# Patient Record
Sex: MALE | Race: WHITE | HISPANIC OR LATINO | Employment: FULL TIME | URBAN - METROPOLITAN AREA
[De-identification: names, ages, dates, MRNs, and addresses within clinical notes are randomized per-mention and may not be internally consistent; named-entity substitution may affect disease eponyms.]

---

## 2017-12-21 ENCOUNTER — NON-PROVIDER VISIT (OUTPATIENT)
Dept: OCCUPATIONAL MEDICINE | Facility: CLINIC | Age: 32
End: 2017-12-21

## 2017-12-21 DIAGNOSIS — Z11.1 ENCOUNTER FOR PPD TEST: ICD-10-CM

## 2017-12-21 PROCEDURE — 86580 TB INTRADERMAL TEST: CPT | Performed by: PREVENTIVE MEDICINE

## 2017-12-23 ENCOUNTER — NON-PROVIDER VISIT (OUTPATIENT)
Dept: URGENT CARE | Facility: CLINIC | Age: 32
End: 2017-12-23

## 2017-12-23 LAB — TB WHEAL 3D P 5 TU DIAM: NORMAL MM

## 2018-06-27 ENCOUNTER — HOSPITAL ENCOUNTER (EMERGENCY)
Dept: HOSPITAL 8 - ED | Age: 33
LOS: 1 days | Discharge: HOME | End: 2018-06-28
Payer: MEDICAID

## 2018-06-27 VITALS — WEIGHT: 315 LBS | HEIGHT: 70 IN | BODY MASS INDEX: 45.1 KG/M2

## 2018-06-27 DIAGNOSIS — E11.9: ICD-10-CM

## 2018-06-27 DIAGNOSIS — I10: ICD-10-CM

## 2018-06-27 DIAGNOSIS — N45.1: Primary | ICD-10-CM

## 2018-06-27 PROCEDURE — 81001 URINALYSIS AUTO W/SCOPE: CPT

## 2018-06-27 PROCEDURE — 87491 CHLMYD TRACH DNA AMP PROBE: CPT

## 2018-06-27 PROCEDURE — 87591 N.GONORRHOEAE DNA AMP PROB: CPT

## 2018-06-27 PROCEDURE — 99285 EMERGENCY DEPT VISIT HI MDM: CPT

## 2018-06-27 PROCEDURE — 76870 US EXAM SCROTUM: CPT

## 2018-06-28 VITALS — DIASTOLIC BLOOD PRESSURE: 82 MMHG | SYSTOLIC BLOOD PRESSURE: 138 MMHG

## 2018-06-28 LAB
CULTURE INDICATED?: NO
MICROSCOPIC: (no result)

## 2018-07-18 ENCOUNTER — HOSPITAL ENCOUNTER (EMERGENCY)
Dept: HOSPITAL 8 - ED | Age: 33
Discharge: HOME | End: 2018-07-18
Payer: MEDICAID

## 2018-07-18 VITALS — BODY MASS INDEX: 45.1 KG/M2 | WEIGHT: 315 LBS | HEIGHT: 70 IN

## 2018-07-18 VITALS — SYSTOLIC BLOOD PRESSURE: 161 MMHG | DIASTOLIC BLOOD PRESSURE: 99 MMHG
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
## 2018-07-18 DIAGNOSIS — F17.210: ICD-10-CM

## 2018-07-18 DIAGNOSIS — S80.01XA: Primary | ICD-10-CM
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
## 2018-07-18 DIAGNOSIS — I10: ICD-10-CM

## 2018-07-18 DIAGNOSIS — E11.9: ICD-10-CM
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
## 2018-07-18 LAB
ALBUMIN SERPL-MCNC: 3.4 G/DL (ref 3.4–5)
ANION GAP SERPL CALC-SCNC: 5 MMOL/L (ref 5–15)
BASOPHILS # BLD AUTO: 0.02 X10^3/UL (ref 0–0.1)
BASOPHILS NFR BLD AUTO: 0 % (ref 0–1)
CALCIUM SERPL-MCNC: 8.7 MG/DL (ref 8.5–10.1)
CHLORIDE SERPL-SCNC: 111 MMOL/L (ref 98–107)
CREAT SERPL-MCNC: 1.04 MG/DL (ref 0.7–1.3)
EOSINOPHIL # BLD AUTO: 0.08 X10^3/UL (ref 0–0.4)
EOSINOPHIL NFR BLD AUTO: 1 % (ref 1–7)
ERYTHROCYTE [DISTWIDTH] IN BLOOD BY AUTOMATED COUNT: 12.6 % (ref 9.4–14.8)
LYMPHOCYTES # BLD AUTO: 1.08 X10^3/UL (ref 1–3.4)
LYMPHOCYTES NFR BLD AUTO: 16 % (ref 22–44)
MCH RBC QN AUTO: 29.7 PG (ref 27.5–34.5)
MCHC RBC AUTO-ENTMCNC: 34.1 G/DL (ref 33.2–36.2)
MCV RBC AUTO: 87.2 FL (ref 81–97)
MD: NO
MONOCYTES # BLD AUTO: 0.1 X10^3/UL (ref 0.2–0.8)
MONOCYTES NFR BLD AUTO: 2 % (ref 2–9)
NEUTROPHILS # BLD AUTO: 5.4 X10^3/UL (ref 1.8–6.8)
NEUTROPHILS NFR BLD AUTO: 81 % (ref 42–75)
PLATELET # BLD AUTO: 190 X10^3/UL (ref 130–400)
PMV BLD AUTO: 8 FL (ref 7.4–10.4)
RBC # BLD AUTO: 5.2 X10^6/UL (ref 4.38–5.82)
TROPONIN I SERPL-MCNC: 0.02 NG/ML (ref 0–0.04)

## 2018-07-18 PROCEDURE — 82040 ASSAY OF SERUM ALBUMIN: CPT

## 2018-07-18 PROCEDURE — 36415 COLL VENOUS BLD VENIPUNCTURE: CPT

## 2018-07-18 PROCEDURE — 93005 ELECTROCARDIOGRAM TRACING: CPT

## 2018-07-18 PROCEDURE — 71045 X-RAY EXAM CHEST 1 VIEW: CPT

## 2018-07-18 PROCEDURE — 99285 EMERGENCY DEPT VISIT HI MDM: CPT

## 2018-07-18 PROCEDURE — 80048 BASIC METABOLIC PNL TOTAL CA: CPT

## 2018-07-18 PROCEDURE — 84484 ASSAY OF TROPONIN QUANT: CPT

## 2018-07-18 PROCEDURE — 85025 COMPLETE CBC W/AUTO DIFF WBC: CPT

## 2018-12-12 ENCOUNTER — NON-PROVIDER VISIT (OUTPATIENT)
Dept: OCCUPATIONAL MEDICINE | Facility: CLINIC | Age: 33
End: 2018-12-12

## 2018-12-12 DIAGNOSIS — Z11.1 PPD SCREENING TEST: ICD-10-CM

## 2018-12-12 PROCEDURE — 86580 TB INTRADERMAL TEST: CPT | Performed by: PREVENTIVE MEDICINE

## 2018-12-15 ENCOUNTER — NON-PROVIDER VISIT (OUTPATIENT)
Dept: URGENT CARE | Facility: CLINIC | Age: 33
End: 2018-12-15
Payer: MEDICAID

## 2018-12-15 LAB — TB WHEAL 3D P 5 TU DIAM: NORMAL MM

## 2018-12-28 ENCOUNTER — HOSPITAL ENCOUNTER (EMERGENCY)
Dept: HOSPITAL 8 - ED | Age: 33
Discharge: HOME | End: 2018-12-28
Payer: MEDICAID

## 2018-12-28 VITALS — WEIGHT: 315 LBS | HEIGHT: 70 IN | BODY MASS INDEX: 45.1 KG/M2

## 2018-12-28 VITALS — SYSTOLIC BLOOD PRESSURE: 145 MMHG | DIASTOLIC BLOOD PRESSURE: 92 MMHG

## 2018-12-28 DIAGNOSIS — R07.89: Primary | ICD-10-CM

## 2018-12-28 DIAGNOSIS — M54.5: ICD-10-CM

## 2018-12-28 DIAGNOSIS — I10: ICD-10-CM

## 2018-12-28 LAB
ALBUMIN SERPL-MCNC: 3.2 G/DL (ref 3.4–5)
ANION GAP SERPL CALC-SCNC: 10 MMOL/L (ref 5–15)
BASOPHILS # BLD AUTO: 0.03 X10^3/UL (ref 0–0.1)
BASOPHILS NFR BLD AUTO: 0 % (ref 0–1)
CALCIUM SERPL-MCNC: 8.7 MG/DL (ref 8.5–10.1)
CHLORIDE SERPL-SCNC: 101 MMOL/L (ref 98–107)
CREAT SERPL-MCNC: 1.25 MG/DL (ref 0.7–1.3)
EOSINOPHIL # BLD AUTO: 0.02 X10^3/UL (ref 0–0.4)
EOSINOPHIL NFR BLD AUTO: 0 % (ref 1–7)
ERYTHROCYTE [DISTWIDTH] IN BLOOD BY AUTOMATED COUNT: 12.6 % (ref 9.4–14.8)
LYMPHOCYTES # BLD AUTO: 0.45 X10^3/UL (ref 1–3.4)
LYMPHOCYTES NFR BLD AUTO: 5 % (ref 22–44)
MCH RBC QN AUTO: 30.7 PG (ref 27.5–34.5)
MCHC RBC AUTO-ENTMCNC: 34.9 G/DL (ref 33.2–36.2)
MCV RBC AUTO: 87.9 FL (ref 81–97)
MD: NO
MONOCYTES # BLD AUTO: 0.05 X10^3/UL (ref 0.2–0.8)
MONOCYTES NFR BLD AUTO: 1 % (ref 2–9)
NEUTROPHILS # BLD AUTO: 7.87 X10^3/UL (ref 1.8–6.8)
NEUTROPHILS NFR BLD AUTO: 94 % (ref 42–75)
PLATELET # BLD AUTO: 244 X10^3/UL (ref 130–400)
PMV BLD AUTO: 7.7 FL (ref 7.4–10.4)
RBC # BLD AUTO: 5.14 X10^6/UL (ref 4.38–5.82)
TROPONIN I SERPL-MCNC: < 0.015 NG/ML (ref 0–0.04)

## 2018-12-28 PROCEDURE — 84484 ASSAY OF TROPONIN QUANT: CPT

## 2018-12-28 PROCEDURE — 99284 EMERGENCY DEPT VISIT MOD MDM: CPT

## 2018-12-28 PROCEDURE — 85025 COMPLETE CBC W/AUTO DIFF WBC: CPT

## 2018-12-28 PROCEDURE — 80048 BASIC METABOLIC PNL TOTAL CA: CPT

## 2018-12-28 PROCEDURE — 36415 COLL VENOUS BLD VENIPUNCTURE: CPT

## 2018-12-28 PROCEDURE — 93005 ELECTROCARDIOGRAM TRACING: CPT

## 2018-12-28 PROCEDURE — 71045 X-RAY EXAM CHEST 1 VIEW: CPT

## 2018-12-28 PROCEDURE — 82040 ASSAY OF SERUM ALBUMIN: CPT

## 2020-01-29 ENCOUNTER — HOSPITAL ENCOUNTER (INPATIENT)
Facility: MEDICAL CENTER | Age: 35
LOS: 3 days | DRG: 065 | End: 2020-02-01
Attending: EMERGENCY MEDICINE | Admitting: HOSPITALIST
Payer: MEDICAID

## 2020-01-29 ENCOUNTER — APPOINTMENT (OUTPATIENT)
Dept: RADIOLOGY | Facility: MEDICAL CENTER | Age: 35
DRG: 065 | End: 2020-01-29
Attending: EMERGENCY MEDICINE
Payer: MEDICAID

## 2020-01-29 DIAGNOSIS — R56.9 SEIZURE (HCC): ICD-10-CM

## 2020-01-29 DIAGNOSIS — I63.81 CEREBROVASCULAR ACCIDENT (CVA) DUE TO OCCLUSION OF SMALL ARTERY (HCC): ICD-10-CM

## 2020-01-29 DIAGNOSIS — R41.0 DELIRIUM: ICD-10-CM

## 2020-01-29 DIAGNOSIS — I16.0 HYPERTENSIVE URGENCY: ICD-10-CM

## 2020-01-29 DIAGNOSIS — I10 HTN (HYPERTENSION): ICD-10-CM

## 2020-01-29 PROBLEM — E87.29 HIGH ANION GAP METABOLIC ACIDOSIS: Status: ACTIVE | Noted: 2020-01-29

## 2020-01-29 PROBLEM — R45.1 AGITATION: Status: ACTIVE | Noted: 2020-01-29

## 2020-01-29 PROBLEM — E87.20 LACTIC ACIDOSIS: Status: ACTIVE | Noted: 2020-01-29

## 2020-01-29 PROBLEM — E11.9 DM (DIABETES MELLITUS) (HCC): Status: ACTIVE | Noted: 2020-01-29

## 2020-01-29 PROBLEM — G93.40 ENCEPHALOPATHY: Status: ACTIVE | Noted: 2020-01-29

## 2020-01-29 LAB
ALBUMIN SERPL BCP-MCNC: 3.8 G/DL (ref 3.2–4.9)
ALBUMIN/GLOB SERPL: 0.8 G/DL
ALP SERPL-CCNC: 102 U/L (ref 30–99)
ALT SERPL-CCNC: 37 U/L (ref 2–50)
AMMONIA PLAS-SCNC: 38 UMOL/L (ref 11–45)
AMPHET UR QL SCN: NEGATIVE
ANION GAP SERPL CALC-SCNC: 17 MMOL/L (ref 0–11.9)
APPEARANCE UR: CLEAR
AST SERPL-CCNC: 29 U/L (ref 12–45)
B-OH-BUTYR SERPL-MCNC: 0.25 MMOL/L (ref 0.02–0.27)
BACTERIA #/AREA URNS HPF: NEGATIVE /HPF
BARBITURATES UR QL SCN: NEGATIVE
BASOPHILS # BLD AUTO: 0.7 % (ref 0–1.8)
BASOPHILS # BLD: 0.05 K/UL (ref 0–0.12)
BENZODIAZ UR QL SCN: POSITIVE
BILIRUB SERPL-MCNC: 1.5 MG/DL (ref 0.1–1.5)
BILIRUB UR QL STRIP.AUTO: NEGATIVE
BUN SERPL-MCNC: 17 MG/DL (ref 8–22)
BZE UR QL SCN: NEGATIVE
CALCIUM SERPL-MCNC: 9.3 MG/DL (ref 8.5–10.5)
CANNABINOIDS UR QL SCN: NEGATIVE
CHLORIDE SERPL-SCNC: 101 MMOL/L (ref 96–112)
CK SERPL-CCNC: 168 U/L (ref 0–154)
CO2 SERPL-SCNC: 17 MMOL/L (ref 20–33)
COHGB MFR BLD: 2.4 % (ref 0–4.9)
COLOR UR: YELLOW
CREAT SERPL-MCNC: 1.06 MG/DL (ref 0.5–1.4)
EOSINOPHIL # BLD AUTO: 0.03 K/UL (ref 0–0.51)
EOSINOPHIL NFR BLD: 0.4 % (ref 0–6.9)
EPI CELLS #/AREA URNS HPF: ABNORMAL /HPF
ERYTHROCYTE [DISTWIDTH] IN BLOOD BY AUTOMATED COUNT: 38 FL (ref 35.9–50)
EST. AVERAGE GLUCOSE BLD GHB EST-MCNC: 318 MG/DL
ETHANOL BLD-MCNC: 0 G/DL
GLOBULIN SER CALC-MCNC: 4.9 G/DL (ref 1.9–3.5)
GLUCOSE SERPL-MCNC: 421 MG/DL (ref 65–99)
GLUCOSE UR STRIP.AUTO-MCNC: >=1000 MG/DL
HBA1C MFR BLD: 12.7 % (ref 0–5.6)
HCT VFR BLD AUTO: 50.5 % (ref 42–52)
HGB BLD-MCNC: 16.9 G/DL (ref 14–18)
HIV 1+2 AB+HIV1 P24 AG SERPL QL IA: NON REACTIVE
HYALINE CASTS #/AREA URNS LPF: ABNORMAL /LPF
IMM GRANULOCYTES # BLD AUTO: 0.06 K/UL (ref 0–0.11)
IMM GRANULOCYTES NFR BLD AUTO: 0.9 % (ref 0–0.9)
KETONES UR STRIP.AUTO-MCNC: ABNORMAL MG/DL
LACTATE BLD-SCNC: 1.7 MMOL/L (ref 0.5–2)
LACTATE BLD-SCNC: 2.6 MMOL/L (ref 0.5–2)
LACTATE BLD-SCNC: 7.2 MMOL/L (ref 0.5–2)
LEUKOCYTE ESTERASE UR QL STRIP.AUTO: NEGATIVE
LYMPHOCYTES # BLD AUTO: 1.16 K/UL (ref 1–4.8)
LYMPHOCYTES NFR BLD: 16.5 % (ref 22–41)
MCH RBC QN AUTO: 28.9 PG (ref 27–33)
MCHC RBC AUTO-ENTMCNC: 33.5 G/DL (ref 33.7–35.3)
MCV RBC AUTO: 86.5 FL (ref 81.4–97.8)
METHADONE UR QL SCN: NEGATIVE
MICRO URNS: ABNORMAL
MONOCYTES # BLD AUTO: 0.32 K/UL (ref 0–0.85)
MONOCYTES NFR BLD AUTO: 4.5 % (ref 0–13.4)
NEUTROPHILS # BLD AUTO: 5.43 K/UL (ref 1.82–7.42)
NEUTROPHILS NFR BLD: 77 % (ref 44–72)
NITRITE UR QL STRIP.AUTO: NEGATIVE
NRBC # BLD AUTO: 0 K/UL
NRBC BLD-RTO: 0 /100 WBC
OPIATES UR QL SCN: NEGATIVE
OSMOLALITY SERPL: 297 MOSM/KG H2O (ref 278–298)
OXYCODONE UR QL SCN: NEGATIVE
PCP UR QL SCN: NEGATIVE
PH UR STRIP.AUTO: 5 [PH] (ref 5–8)
PLATELET # BLD AUTO: 201 K/UL (ref 164–446)
PMV BLD AUTO: 10 FL (ref 9–12.9)
POTASSIUM SERPL-SCNC: 4 MMOL/L (ref 3.6–5.5)
PROLACTIN SERPL-MCNC: 19.26 NG/ML (ref 2.1–17.7)
PROPOXYPH UR QL SCN: NEGATIVE
PROT SERPL-MCNC: 8.7 G/DL (ref 6–8.2)
PROT UR QL STRIP: 30 MG/DL
RBC # BLD AUTO: 5.84 M/UL (ref 4.7–6.1)
RBC # URNS HPF: ABNORMAL /HPF
RBC UR QL AUTO: NEGATIVE
SODIUM SERPL-SCNC: 135 MMOL/L (ref 135–145)
SP GR UR REFRACTOMETRY: >1.05
T4 FREE SERPL-MCNC: 0.96 NG/DL (ref 0.53–1.43)
TREPONEMA PALLIDUM IGG+IGM AB [PRESENCE] IN SERUM OR PLASMA BY IMMUNOASSAY: NON REACTIVE
TSH SERPL DL<=0.005 MIU/L-ACNC: 2.26 UIU/ML (ref 0.38–5.33)
UROBILINOGEN UR STRIP.AUTO-MCNC: 1 MG/DL
VIT B12 SERPL-MCNC: 493 PG/ML (ref 211–911)
WBC # BLD AUTO: 7.1 K/UL (ref 4.8–10.8)
WBC #/AREA URNS HPF: ABNORMAL /HPF
YEAST BUDDING URNS QL: PRESENT /HPF

## 2020-01-29 PROCEDURE — 36415 COLL VENOUS BLD VENIPUNCTURE: CPT

## 2020-01-29 PROCEDURE — 87389 HIV-1 AG W/HIV-1&-2 AB AG IA: CPT

## 2020-01-29 PROCEDURE — 86780 TREPONEMA PALLIDUM: CPT

## 2020-01-29 PROCEDURE — 700105 HCHG RX REV CODE 258: Performed by: EMERGENCY MEDICINE

## 2020-01-29 PROCEDURE — 700111 HCHG RX REV CODE 636 W/ 250 OVERRIDE (IP): Performed by: EMERGENCY MEDICINE

## 2020-01-29 PROCEDURE — 80307 DRUG TEST PRSMV CHEM ANLYZR: CPT

## 2020-01-29 PROCEDURE — 700111 HCHG RX REV CODE 636 W/ 250 OVERRIDE (IP): Performed by: HOSPITALIST

## 2020-01-29 PROCEDURE — 700105 HCHG RX REV CODE 258: Performed by: HOSPITALIST

## 2020-01-29 PROCEDURE — 99291 CRITICAL CARE FIRST HOUR: CPT | Performed by: INTERNAL MEDICINE

## 2020-01-29 PROCEDURE — 96374 THER/PROPH/DIAG INJ IV PUSH: CPT

## 2020-01-29 PROCEDURE — 83605 ASSAY OF LACTIC ACID: CPT

## 2020-01-29 PROCEDURE — 700111 HCHG RX REV CODE 636 W/ 250 OVERRIDE (IP)

## 2020-01-29 PROCEDURE — 82375 ASSAY CARBOXYHB QUANT: CPT

## 2020-01-29 PROCEDURE — 82140 ASSAY OF AMMONIA: CPT

## 2020-01-29 PROCEDURE — 304561 HCHG STAT O2

## 2020-01-29 PROCEDURE — 96375 TX/PRO/DX INJ NEW DRUG ADDON: CPT

## 2020-01-29 PROCEDURE — 82010 KETONE BODYS QUAN: CPT

## 2020-01-29 PROCEDURE — 95819 EEG AWAKE AND ASLEEP: CPT | Mod: 26 | Performed by: PSYCHIATRY & NEUROLOGY

## 2020-01-29 PROCEDURE — 82607 VITAMIN B-12: CPT

## 2020-01-29 PROCEDURE — 84146 ASSAY OF PROLACTIN: CPT

## 2020-01-29 PROCEDURE — 83036 HEMOGLOBIN GLYCOSYLATED A1C: CPT

## 2020-01-29 PROCEDURE — 95819 EEG AWAKE AND ASLEEP: CPT | Performed by: PSYCHIATRY & NEUROLOGY

## 2020-01-29 PROCEDURE — 700101 HCHG RX REV CODE 250: Performed by: INTERNAL MEDICINE

## 2020-01-29 PROCEDURE — 96372 THER/PROPH/DIAG INJ SC/IM: CPT

## 2020-01-29 PROCEDURE — 83930 ASSAY OF BLOOD OSMOLALITY: CPT

## 2020-01-29 PROCEDURE — 81001 URINALYSIS AUTO W/SCOPE: CPT

## 2020-01-29 PROCEDURE — 99223 1ST HOSP IP/OBS HIGH 75: CPT | Performed by: HOSPITALIST

## 2020-01-29 PROCEDURE — 84439 ASSAY OF FREE THYROXINE: CPT

## 2020-01-29 PROCEDURE — 700101 HCHG RX REV CODE 250: Performed by: EMERGENCY MEDICINE

## 2020-01-29 PROCEDURE — 80053 COMPREHEN METABOLIC PANEL: CPT

## 2020-01-29 PROCEDURE — 4A10X4Z MONITORING OF CENTRAL NERVOUS ELECTRICAL ACTIVITY, EXTERNAL APPROACH: ICD-10-PCS | Performed by: PSYCHIATRY & NEUROLOGY

## 2020-01-29 PROCEDURE — 84443 ASSAY THYROID STIM HORMONE: CPT

## 2020-01-29 PROCEDURE — 96376 TX/PRO/DX INJ SAME DRUG ADON: CPT

## 2020-01-29 PROCEDURE — 85025 COMPLETE CBC W/AUTO DIFF WBC: CPT

## 2020-01-29 PROCEDURE — 82550 ASSAY OF CK (CPK): CPT

## 2020-01-29 PROCEDURE — 99291 CRITICAL CARE FIRST HOUR: CPT

## 2020-01-29 PROCEDURE — 770022 HCHG ROOM/CARE - ICU (200)

## 2020-01-29 PROCEDURE — 70450 CT HEAD/BRAIN W/O DYE: CPT

## 2020-01-29 RX ORDER — AMOXICILLIN 250 MG
2 CAPSULE ORAL 2 TIMES DAILY
Status: DISCONTINUED | OUTPATIENT
Start: 2020-01-29 | End: 2020-02-01 | Stop reason: HOSPADM

## 2020-01-29 RX ORDER — PROCHLORPERAZINE EDISYLATE 5 MG/ML
5-10 INJECTION INTRAMUSCULAR; INTRAVENOUS EVERY 4 HOURS PRN
Status: DISCONTINUED | OUTPATIENT
Start: 2020-01-29 | End: 2020-02-01 | Stop reason: HOSPADM

## 2020-01-29 RX ORDER — LORAZEPAM 2 MG/ML
2 INJECTION INTRAMUSCULAR
Status: DISCONTINUED | OUTPATIENT
Start: 2020-01-29 | End: 2020-02-01 | Stop reason: HOSPADM

## 2020-01-29 RX ORDER — HYDRALAZINE HYDROCHLORIDE 20 MG/ML
10-20 INJECTION INTRAMUSCULAR; INTRAVENOUS EVERY 6 HOURS PRN
Status: DISCONTINUED | OUTPATIENT
Start: 2020-01-29 | End: 2020-02-01 | Stop reason: HOSPADM

## 2020-01-29 RX ORDER — ONDANSETRON 4 MG/1
4 TABLET, ORALLY DISINTEGRATING ORAL EVERY 4 HOURS PRN
Status: DISCONTINUED | OUTPATIENT
Start: 2020-01-29 | End: 2020-02-01 | Stop reason: HOSPADM

## 2020-01-29 RX ORDER — PROMETHAZINE HYDROCHLORIDE 25 MG/1
12.5-25 SUPPOSITORY RECTAL EVERY 4 HOURS PRN
Status: DISCONTINUED | OUTPATIENT
Start: 2020-01-29 | End: 2020-02-01 | Stop reason: HOSPADM

## 2020-01-29 RX ORDER — ONDANSETRON 2 MG/ML
4 INJECTION INTRAMUSCULAR; INTRAVENOUS EVERY 4 HOURS PRN
Status: DISCONTINUED | OUTPATIENT
Start: 2020-01-29 | End: 2020-02-01 | Stop reason: HOSPADM

## 2020-01-29 RX ORDER — LORAZEPAM 2 MG/ML
2 INJECTION INTRAMUSCULAR ONCE
Status: COMPLETED | OUTPATIENT
Start: 2020-01-29 | End: 2020-01-29

## 2020-01-29 RX ORDER — HEPARIN SODIUM 5000 [USP'U]/ML
5000 INJECTION, SOLUTION INTRAVENOUS; SUBCUTANEOUS EVERY 8 HOURS
Status: DISCONTINUED | OUTPATIENT
Start: 2020-01-29 | End: 2020-02-01 | Stop reason: HOSPADM

## 2020-01-29 RX ORDER — SODIUM CHLORIDE 9 MG/ML
INJECTION, SOLUTION INTRAVENOUS CONTINUOUS
Status: DISCONTINUED | OUTPATIENT
Start: 2020-01-29 | End: 2020-01-30

## 2020-01-29 RX ORDER — ONDANSETRON 2 MG/ML
4 INJECTION INTRAMUSCULAR; INTRAVENOUS ONCE
Status: COMPLETED | OUTPATIENT
Start: 2020-01-29 | End: 2020-01-29

## 2020-01-29 RX ORDER — POLYETHYLENE GLYCOL 3350 17 G/17G
1 POWDER, FOR SOLUTION ORAL
Status: DISCONTINUED | OUTPATIENT
Start: 2020-01-29 | End: 2020-02-01 | Stop reason: HOSPADM

## 2020-01-29 RX ORDER — LORAZEPAM 2 MG/ML
4 INJECTION INTRAMUSCULAR
Status: DISCONTINUED | OUTPATIENT
Start: 2020-01-29 | End: 2020-01-29

## 2020-01-29 RX ORDER — ONDANSETRON 2 MG/ML
INJECTION INTRAMUSCULAR; INTRAVENOUS
Status: COMPLETED
Start: 2020-01-29 | End: 2020-01-29

## 2020-01-29 RX ORDER — HALOPERIDOL 5 MG/ML
2-5 INJECTION INTRAMUSCULAR EVERY 4 HOURS PRN
Status: DISCONTINUED | OUTPATIENT
Start: 2020-01-29 | End: 2020-02-01 | Stop reason: HOSPADM

## 2020-01-29 RX ORDER — SODIUM CHLORIDE, SODIUM LACTATE, POTASSIUM CHLORIDE, CALCIUM CHLORIDE 600; 310; 30; 20 MG/100ML; MG/100ML; MG/100ML; MG/100ML
2000 INJECTION, SOLUTION INTRAVENOUS ONCE
Status: COMPLETED | OUTPATIENT
Start: 2020-01-29 | End: 2020-01-29

## 2020-01-29 RX ORDER — BISACODYL 10 MG
10 SUPPOSITORY, RECTAL RECTAL
Status: DISCONTINUED | OUTPATIENT
Start: 2020-01-29 | End: 2020-02-01 | Stop reason: HOSPADM

## 2020-01-29 RX ORDER — PROMETHAZINE HYDROCHLORIDE 25 MG/1
12.5-25 TABLET ORAL EVERY 4 HOURS PRN
Status: DISCONTINUED | OUTPATIENT
Start: 2020-01-29 | End: 2020-02-01 | Stop reason: HOSPADM

## 2020-01-29 RX ORDER — LABETALOL HYDROCHLORIDE 5 MG/ML
10 INJECTION, SOLUTION INTRAVENOUS ONCE
Status: COMPLETED | OUTPATIENT
Start: 2020-01-29 | End: 2020-01-29

## 2020-01-29 RX ORDER — ENALAPRILAT 1.25 MG/ML
1.25 INJECTION INTRAVENOUS EVERY 6 HOURS PRN
Status: DISCONTINUED | OUTPATIENT
Start: 2020-01-29 | End: 2020-01-31

## 2020-01-29 RX ORDER — LABETALOL HYDROCHLORIDE 5 MG/ML
10-20 INJECTION, SOLUTION INTRAVENOUS EVERY 6 HOURS PRN
Status: DISCONTINUED | OUTPATIENT
Start: 2020-01-29 | End: 2020-01-31

## 2020-01-29 RX ORDER — CHLORTHALIDONE 25 MG/1
25 TABLET ORAL DAILY
Status: DISCONTINUED | OUTPATIENT
Start: 2020-01-29 | End: 2020-02-01 | Stop reason: HOSPADM

## 2020-01-29 RX ORDER — LORAZEPAM 2 MG/ML
2 INJECTION INTRAMUSCULAR ONCE
Status: DISCONTINUED | OUTPATIENT
Start: 2020-01-29 | End: 2020-01-29

## 2020-01-29 RX ORDER — HYDRALAZINE HYDROCHLORIDE 20 MG/ML
10 INJECTION INTRAMUSCULAR; INTRAVENOUS EVERY 6 HOURS PRN
Status: DISCONTINUED | OUTPATIENT
Start: 2020-01-29 | End: 2020-01-29

## 2020-01-29 RX ADMIN — HYDRALAZINE HYDROCHLORIDE 10 MG: 20 INJECTION INTRAMUSCULAR; INTRAVENOUS at 14:10

## 2020-01-29 RX ADMIN — SODIUM CHLORIDE, POTASSIUM CHLORIDE, SODIUM LACTATE AND CALCIUM CHLORIDE 2000 ML: 600; 310; 30; 20 INJECTION, SOLUTION INTRAVENOUS at 02:45

## 2020-01-29 RX ADMIN — LORAZEPAM 2 MG: 2 INJECTION INTRAMUSCULAR; INTRAVENOUS at 04:28

## 2020-01-29 RX ADMIN — HEPARIN SODIUM 5000 UNITS: 5000 INJECTION, SOLUTION INTRAVENOUS; SUBCUTANEOUS at 13:58

## 2020-01-29 RX ADMIN — ONDANSETRON 4 MG: 2 INJECTION INTRAMUSCULAR; INTRAVENOUS at 05:00

## 2020-01-29 RX ADMIN — HALOPERIDOL LACTATE 5 MG: 5 INJECTION, SOLUTION INTRAMUSCULAR at 13:58

## 2020-01-29 RX ADMIN — LORAZEPAM 2 MG: 2 INJECTION INTRAMUSCULAR; INTRAVENOUS at 14:30

## 2020-01-29 RX ADMIN — LABETALOL HYDROCHLORIDE 10 MG: 5 INJECTION, SOLUTION INTRAVENOUS at 06:00

## 2020-01-29 RX ADMIN — SODIUM CHLORIDE: 9 INJECTION, SOLUTION INTRAVENOUS at 13:58

## 2020-01-29 RX ADMIN — LORAZEPAM 2 MG: 2 INJECTION INTRAMUSCULAR; INTRAVENOUS at 03:45

## 2020-01-29 RX ADMIN — HEPARIN SODIUM 5000 UNITS: 5000 INJECTION, SOLUTION INTRAVENOUS; SUBCUTANEOUS at 22:10

## 2020-01-29 RX ADMIN — HYDRALAZINE HYDROCHLORIDE 10 MG: 20 INJECTION INTRAMUSCULAR; INTRAVENOUS at 14:50

## 2020-01-29 RX ADMIN — SODIUM CHLORIDE: 9 INJECTION, SOLUTION INTRAVENOUS at 22:26

## 2020-01-29 RX ADMIN — LABETALOL HYDROCHLORIDE 5 MG: 5 INJECTION, SOLUTION INTRAVENOUS at 22:10

## 2020-01-29 ASSESSMENT — LIFESTYLE VARIABLES
HAVE PEOPLE ANNOYED YOU BY CRITICIZING YOUR DRINKING: NO
ALCOHOL_USE: NO
EVER HAD A DRINK FIRST THING IN THE MORNING TO STEADY YOUR NERVES TO GET RID OF A HANGOVER: NO
TOTAL SCORE: 0
HOW MANY TIMES IN THE PAST YEAR HAVE YOU HAD 5 OR MORE DRINKS IN A DAY: 0
TOTAL SCORE: 0
TOTAL SCORE: 0
EVER FELT BAD OR GUILTY ABOUT YOUR DRINKING: NO
HAVE YOU EVER FELT YOU SHOULD CUT DOWN ON YOUR DRINKING: NO
EVER_SMOKED: YES
CONSUMPTION TOTAL: NEGATIVE
AVERAGE NUMBER OF DAYS PER WEEK YOU HAVE A DRINK CONTAINING ALCOHOL: 0
ON A TYPICAL DAY WHEN YOU DRINK ALCOHOL HOW MANY DRINKS DO YOU HAVE: 0

## 2020-01-29 ASSESSMENT — COPD QUESTIONNAIRES
IN THE PAST 12 MONTHS DO YOU DO LESS THAN YOU USED TO BECAUSE OF YOUR BREATHING PROBLEMS: DISAGREE/UNSURE
DO YOU EVER COUGH UP ANY MUCUS OR PHLEGM?: NO/ONLY WITH OCCASIONAL COLDS OR INFECTIONS
COPD SCREENING SCORE: 0
HAVE YOU SMOKED AT LEAST 100 CIGARETTES IN YOUR ENTIRE LIFE: NO/DON'T KNOW
DURING THE PAST 4 WEEKS HOW MUCH DID YOU FEEL SHORT OF BREATH: NONE/LITTLE OF THE TIME

## 2020-01-29 ASSESSMENT — PATIENT HEALTH QUESTIONNAIRE - PHQ9
SUM OF ALL RESPONSES TO PHQ9 QUESTIONS 1 AND 2: 0
2. FEELING DOWN, DEPRESSED, IRRITABLE, OR HOPELESS: NOT AT ALL
1. LITTLE INTEREST OR PLEASURE IN DOING THINGS: NOT AT ALL

## 2020-01-29 NOTE — ED NOTES
Patient noted to be climbing out of bed.  Security called to bedside for assistance. Patient unsteady on his feet.  Patient remains confused and redirected back into stretcher.  Cardiac monitor re-applied.  Mouth care provided as requested. Placed in medical restraints.

## 2020-01-29 NOTE — H&P
Hospital Medicine History & Physical Note    Date of Service  1/29/2020    Primary Care Physician  Brandy Mancuso M.D.    Consultants  none    Code Status  full    Chief Complaint  Encephalopathy     History of Presenting Illness  34 y.o. male who presented 1/29/2020 with past medical history of hypertension, morbid obesity, diabetes who presents with encephalopathy.  This patient presents with altered level of consciousness.  Apparently this patient was in her usual state of health sitting on chair in his family room and had generalizing shaking type seizure.  EMS arrived and this patient was appearing to have generalized tonic-clonic seizure given 5 mg of Versed IM.  This patient became significantly combative and required restraints afterwards and is currently oriented x0.  He was also given 4 mg of IV Ativan in the emergency department with mild improvement of his symptoms.  Otherwise he has no known alleviating or exacerbating factors to his symptoms.  He will be admitted to the hospital for further work-up of his encephalopathy of unclear etiology.    Review of Systems  Review of Systems   Unable to perform ROS: Mental status change       Past Medical History   has a past medical history of Hypertension and Obesities, morbid (HCC).    Surgical History  Reviewed and not pertinent     Family History  family history includes Diabetes in his father.     Social History   reports that he has been smoking cigarettes. He has a 4.00 pack-year smoking history. He has never used smokeless tobacco. He reports current drug use. He reports that he does not drink alcohol.    Allergies  No Known Allergies    Medications  Prior to Admission Medications   Prescriptions Last Dose Informant Patient Reported? Taking?   chlorthalidone (HYGROTON) 25 MG TABS   No No   Sig: Take 1 Tab by mouth every day.   metformin (GLUCOPHAGE) 1000 MG tablet   No No   Sig: Take 1 Tab by mouth 2 times a day, with meals.   metformin (GLUCOPHAGE) 500  MG TABS   No No   Sig: Take 1 Tab by mouth 2 times a day, with meals.   potassium chloride (KLOR-CON) 20 MEQ PACK   No No   Sig: Take 1 Packet by mouth every day.      Facility-Administered Medications: None       Physical Exam  Temp:  [36.4 °C (97.6 °F)] 36.4 °C (97.6 °F)  Pulse:  [105-128] 109  Resp:  [22-30] 22  BP: (158-195)/() 166/95  SpO2:  [88 %-97 %] 95 %    Physical Exam  Vitals signs reviewed.   Constitutional:       General: He is not in acute distress.     Appearance: He is ill-appearing.   HENT:      Head: Normocephalic and atraumatic.      Nose: No congestion.   Eyes:      Extraocular Movements: Extraocular movements intact.      Pupils: Pupils are equal, round, and reactive to light.   Neck:      Musculoskeletal: Neck supple.   Cardiovascular:      Rate and Rhythm: Normal rate and regular rhythm.      Pulses: Normal pulses.      Heart sounds: Normal heart sounds.   Pulmonary:      Effort: Pulmonary effort is normal. No respiratory distress.      Breath sounds: Normal breath sounds. No wheezing.   Abdominal:      General: Bowel sounds are normal. There is no distension.      Palpations: Abdomen is soft.      Tenderness: There is no tenderness.   Musculoskeletal:         General: No swelling.   Skin:     General: Skin is warm and dry.      Capillary Refill: Capillary refill takes 2 to 3 seconds.   Neurological:      Mental Status: He is alert. He is disoriented.      Comments: Somnolent but arousable and oriented x 0   Psychiatric:      Comments: Markedly confused unable to determine          Laboratory:  Recent Labs     01/29/20 0200   WBC 7.1   RBC 5.84   HEMOGLOBIN 16.9   HEMATOCRIT 50.5   MCV 86.5   MCH 28.9   MCHC 33.5*   RDW 38.0   PLATELETCT 201   MPV 10.0     Recent Labs     01/29/20 0200   SODIUM 135   POTASSIUM 4.0   CHLORIDE 101   CO2 17*   GLUCOSE 421*   BUN 17   CREATININE 1.06   CALCIUM 9.3     Recent Labs     01/29/20 0200   ALTSGPT 37   ASTSGOT 29   ALKPHOSPHAT 102*   TBILIRUBIN  1.5   GLUCOSE 421*         No results for input(s): NTPROBNP in the last 72 hours.      No results for input(s): TROPONINT in the last 72 hours.    Urinalysis:    No results found     Imaging:  CT-HEAD W/O   Final Result      No evidence of acute intracranial process.      MR-BRAIN-W/O    (Results Pending)         Assessment/Plan:  I anticipate this patient will require at least two midnights for appropriate medical management, necessitating inpatient admission.    * Encephalopathy  Assessment & Plan  Admit to neuro   Unclear etiology of patients symptoms but suspect probably underlying seizure  EEG, MRI brain ordered   Ct head unremakrbale   Tsh, ammonia, b12, HIV, syphilis   UDS, UA   Mag level   Neuro checks   Consider psych eval vs neuro as clinically appropriate     Agitation  Assessment & Plan  PRN haldol ordered    DM (diabetes mellitus) (HCC)  Assessment & Plan  SSI ordered   accu checks   Check a1c    High anion gap metabolic acidosis  Assessment & Plan  Suspect due to seizure and lactic acidosis  IVF and trend   Check beta hydroxy and serum osmolality   This is not sepsis induced    Morbid obesity (HCC)- (present on admission)  Assessment & Plan  Encouraged weight loss    HTN (hypertension)- (present on admission)  Assessment & Plan  Resume home chlorthalidone   Prn hydralazine ordered      VTE prophylaxis: heparin

## 2020-01-29 NOTE — ASSESSMENT & PLAN NOTE
With hyperglycemia  Poorly controlled with a hemoglobin A1c of 12.7  Sliding scale insulin and diabetic diet  He is not open to insulin thus initiate oral medications (hold on metformin due to contrast).

## 2020-01-29 NOTE — ED PROVIDER NOTES
"ED Provider Note    ED Provider Note    Primary care provider: Brandy Mancuso M.D.  Means of arrival: EMS  History obtained from: EMS    CHIEF COMPLAINT  Chief Complaint   Patient presents with   • ALOC   • Seizure     Seen at 1:52 AM.   HPI  Christ Calixto is a 34 y.o. male who presents to the Emergency Department with a possible seizure.  The patient apparently was in his usual state of health, sitting on the couch when his family witnessed a generalized shaking type of seizure.  The paramedics evaluated the patient and in their presence he appeared to have a generalized tonic-clonic seizure.  He received 5 mg of Versed IM.  The patient became combative and required restraints.  No additional history is able to be obtained at this time.  The patient is altered.  Accu-Chek was over 300.    REVIEW OF SYSTEMS  See HPI,   Remainder of ROS unobtainable at this time.     PAST MEDICAL HISTORY   has a past medical history of Hypertension and Obesities, morbid (HCC).    SURGICAL HISTORY  patient denies any surgical history    SOCIAL HISTORY  Social History     Tobacco Use   • Smoking status: Current Every Day Smoker     Packs/day: 0.50     Years: 8.00     Pack years: 4.00     Types: Cigarettes   • Smokeless tobacco: Never Used   • Tobacco comment: quit mandy 12/6/10   Substance Use Topics   • Alcohol use: No   • Drug use: Yes     Comment: smokes marajuana       Social History     Substance and Sexual Activity   Drug Use Yes    Comment: smokes marajuana        FAMILY HISTORY  Family History   Problem Relation Age of Onset   • Diabetes Father        CURRENT MEDICATIONS  Reviewed.  See Encounter Summary.     ALLERGIES  No Known Allergies    PHYSICAL EXAM  VITAL SIGNS: BP (!) 175/110   Pulse (!) 105   Temp 36.4 °C (97.6 °F)   Resp (!) 24   Ht 1.778 m (5' 10\")   Wt (!) 194.6 kg (429 lb)   SpO2 92%   BMI 61.56 kg/m²   Constitutional: Awake, alert, pulling at restraints, saying \"please help me\".  Morbidly obese  HENT: " Normocephalic, atraumatic.  Bilateral external ears normal. Nose normal.   Eyes: Conjunctiva normal, non-icteric, EOMI. PERRLA 4 mm  Thorax & Lungs: Easy unlabored respirations, Clear to ascultation bilaterally.  Cardiovascular: Regular rate, Regular rhythm, No murmurs, rubs or gallops.  Abdomen:  Soft, nontender, nondistended, normal active bowel sounds.   :    Skin: Visualized skin is  Dry, No erythema, No rash.   Musculoskeletal:   No cyanosis, clubbing or edema.  Neurologic: Alert, Grossly non-focal.  Moving all extremities equally.  Pulling at restraints.  No facial droop.  No dysarthria.  GCS 14.  Psychiatric: Anxious, appears to be postictal  Lymphatic:  No cervical LAD        RADIOLOGY  CT-HEAD W/O   Final Result      No evidence of acute intracranial process.            COURSE & MEDICAL DECISION MAKING  Pertinent Labs & Imaging studies reviewed. (See chart for details)    Differential diagnoses include but are not limited to: New onset seizures, ICH, intracranial mass, carbon monoxide poisoning, electrolyte disturbance, hyperthyroidism, alcohol withdrawal, hypertensive encephalopathy    1:52 AM - Medical record reviewed, no recent visits, history of NIDDM    3:45 AM -patient is slightly improved, he is perseverating, he still is asking to be sent home.  He does not answer questions appropriately, he cannot correctly state where he is or why he is here.  He is not cooperative with redirection.  Plan to give 2 mg of Ativan to hopefully calm the patient and allow imaging of the brain.  Patient has an elevated lactate, he will receive IV fluids for possible sepsis.  He is n.p.o. at this time, and inappropriate to do a p.o. rehydration for a lactate over 4.    4:20 AM-diagnostic alcohol 0.  The patient is still delirious, he is not directable.  This is after 2 mg of Ativan.  Plan to administer 2 more milligrams of Ativan.  It is possible this is alcohol withdrawal, he is significantly tachycardic and  "hypertensive.  He is not tremulous.    4:34 AM-still no improvement after 4 mg total of IV Ativan, the patient is still pulling at restraints, yelling mom, he does not answer questions appropriately and still appears to be delirious.  Will bring family back for additional information and assistance.  Plan to give an additional 2 mg of Ativan.  Other consideration at this time would be hyperthyroidism or hypertensive encephalopathy.    4:55 AM -family now at bedside.  The mother does not appear overly concerned.  She states he does not drink alcohol.  She states that he \"is spoiled\" and some of this is him not being comfortable in a recumbent position.  She is not exactly sure why 911 was activated, she believes that there may have been some type of seizure activity or possibly due to him being upset for some other reason, she was not there for the initial event.    We did remove the restraints, he sat up and was somewhat cooperative.  He was asking for water, and he subsequently drank 3 large glasses of water and then vomited profusely all over the floor.  Zofran administered.  The patient was compliant and able to remain still during a CT scan.  Official read is pending, there is a hyperdense lesion on the vertex of the brain.    5:42 AM-CT negative, the patient does have a calcified meningioma.  Patient is somnolent at this point.  I discussed the plan for admission with the mother.  I will give him a 10 mg dose of labetalol to gently reduce his hypertension and tachycardia.  Urine drug screen ordered.  Case will be discussed with the hospitalist regarding admission.  Case discussed with Dr. Gore will graciously evaluate the patient for admission peer    Decision Making:  This is a 34 y.o. year old male who presents with possible seizure activity the initial lactate was impressively elevated.  After 1 L of fluids this went from 7.2-2.6, thus likely due to seizure activity.  The patient does not have any " leukocytosis or leftward shift.  He denies any headache.  I do not suspect acute bacterial meningitis.  He does not have any electrolyte derangement.  He does have hyperglycemia.  He is not on any medications for hypertension or diabetes according to the mother.  He has been lost to follow-up and they are working to obtain a outpatient primary care physician.    I considered alcohol withdrawal given his tachycardia, hypertension and some altered mental status with recent seizures.  The mother states that he does not drink alcohol.  Labs also do not indicate alcohol dependence, the patient does not have an elevated MCV, he has no LFT abnormalities.    She overall is not concerned that he is acting any different than usual.  She feels that he is just anxious from being in a hospital and having restraints.    In summary, this is a 34-year-old male with uncontrolled hypertension who presents after 2 probable seizures who has not quite returned to baseline.  CT negative for ICH or mass.  No severe electrolyte derangement, no carbon monoxide poisoning, no sign of sepsis.  Differential is hypertensive encephalopathy versus prolonged postictal state.  I do not suspect status epilepticus as the patient does follow commands, moving all extremities and is able to speak without difficulty.  Because he is not quite normal we will gently control his blood pressure, admit for further observation, consider EEG if any recurrent seizure activity or the patient does not return to normal.      CRITICAL CARE  The very real possibilty of a deterioration of this patient's condition required the highest level of my preparedness for sudden, emergent intervention.  I provided critical care services, which included medication orders, frequent reevaluations of the patient's condition and response to treatment, ordering and reviewing test results, and discussing the case with various consultants.  The critical care time associated with the care  of the patient was 40 minutes. Review chart for interventions. This time is exclusive of any other billable procedures.       FINAL IMPRESSION  1. Delirium    2. Seizure (HCC)    3. Hypertensive urgency

## 2020-01-29 NOTE — PROCEDURES
ROUTINE ELECTROENCEPHALOGRAM REPORT      Referring provider: Dr. Vianney Gore MD    DOS: 1/19/2020 (total recording of 24 minutes)    INDICATION:  Christ Calixto 34 y.o. male presenting with seizure    CURRENT ANTIEPILEPTIC REGIMEN: N/A    TECHNIQUE: 30 channel routine electroencephalogram (EEG) was performed in accordance with the international 10-20 system. The study was reviewed in bipolar and referential montages. The recording examined the patient during wakeful and drowsy/sleep state(s).     DESCRIPTION OF THE RECORD:  The EEG during wakefulness shows a 9 to 10 Hz activity over the posterior head regions.  No epileptiform discharges or subclinical seizures were seen.  There is presence of EKG artifact throughout the record.  During the sleep recording symmetric post, V waves and K complexes were seen.  There was presence of excess beta activity in the bilateral frontal head regions.    ACTIVATION PROCEDURES:   Photic stimulation did not produce a driving response.  Hyperventilation was not performed.    ICTAL AND/OR INTERICTAL FINDINGS:   No focal or generalized epileptiform activity noted. No regional slowing was seen during this routine study.  No clinical events or seizures were reported or recorded during the study.     EKG: sampling of the EKG recording demonstrated sinus rhythm.       INTERPRETATION:  This is a normal awake through N2 sleep EEG.  Sense of excess beta in the bilateral frontal head regions would be consistent with medication effect.  No epileptiform discharges or subclinical seizures were seen throughout the record.    Note: A normal EEG does not rule out epilepsy.  If the clinical suspicion remains high for seizures, a prolonged recording to capture clinical or subclinical events may be helpful.    Olaf Baker M.D., Diplomat of the American Board of Psychiatry and Neurology  Diplomat of [employer]BP[employer] Epilepsy Subspecialty   Assistant Clinical Professor, [facility]  Neurology Consultant

## 2020-01-29 NOTE — ED NOTES
Pt oriented to name.  Pt remains confused with bizarre responses.  Pt consistently trying to take off monitoring equipment and trying to get out of bed.

## 2020-01-29 NOTE — ED TRIAGE NOTES
"Chief Complaint   Patient presents with   • ALOC   • Seizure     BIB EMS for ALOC and witnessed seizure. Per EMS pt's family reports seizure like activity PTA and then EMS witnessed a full tonic clonic seizure. Pt was given 5 mg IM versed and seizure stopped. Pt later awoke combative and confused in route to ED and was given another 5 mg of IM versed. Pt arrives thrashing on EMS gurney and altered. Pt placed on cardiac monitor, BP cuff and pulse ox.    BP (!) 170/100   Pulse (!) 124   Temp 36.4 °C (97.6 °F)   Resp (!) 30   Ht 1.778 m (5' 10\")   Wt (!) 194.6 kg (429 lb)   SpO2 88%   BMI 61.56 kg/m²     "

## 2020-01-29 NOTE — PROGRESS NOTES
Spiritual Care Note    Patient Information     Patient's Name: Christ Calixto   MRN: [id number]    YOB: 1985   Age and Gender: 34 y.o. male   Service Area: ED [facility]   Room (and Bed):  14/14 [facility]   Ethnicity or Nationality:     Primary Language: English   Restorationist/Spiritual preference: Zoroastrian   Place of Residence: [place], [place]   Family/Friends/Others Present: No   Clinical Team Present: No   Medical Diagnosis(-es)/Procedure(s): Encephalopathy   Code Status: Full Code    Date of Admission: 1/29/2020   Length of Stay: 0 days        Spiritual Care Provider Information:  Name of Spiritual Care Provider: China Azevedo  Title of Spiritual Care Provider: Associate   Phone Number: [phone]  E-mail: [email or web address]  Total time : 15 minutes    Spiritual Screen Results:    Gen Nursing        Palliative Care         Encounter/Request Information  Encounter/Request Type   Visited With: Patient  Nature of the Visit: Initial  General Visit: Yes  Referral From/ Origin of Request: [place] rounds, Verbal patient    Religous Needs/Values       Spiritual Assessment     Spiritual Care Encounters    Observations/Symptoms: Confusion, Frustration    Interaction/Conversation: Pt wanted  to let him go home, and -- when she explained she didn't make d/c decisions -- to loosen his ankle restraint.   alerted nurse, who reported that the restraint had just been checked and was safe, and that the patient was experiencing memory gaps after a seizure.    Assessment: Distress    Distress: Coping    Interventions: Compassionate presence    Outcomes: Ability to Communicate with Truth and Honesty    Plan: Visit Upon Request    Notes:

## 2020-01-29 NOTE — ED NOTES
paged re: increased agitation  despite Haldol.  Patient thrashing in stretcher, pulling cardiac monitor and pulse ox off. Attempts made to re-direct patient.

## 2020-01-29 NOTE — ED NOTES
Medication Reconciliation updated and complete per patient family at bedside  Allergies reviewed           Pt reports NO Prescription or OTC medications

## 2020-01-29 NOTE — ED NOTES
Health Maintenance Due   Topic Date Due   • Colorectal Cancer Screening-Colonoscopy  04/21/2004   • Shingles Vaccine (1 of 2) 04/21/2004   • Breast Cancer Screening  01/28/2012   • Osteoporosis Screening  04/21/2019   • Medicare Wellness 65+  04/21/2019   • Pneumococcal Vaccine 65+ (1 of 2 - PCV13) 04/21/2019       Patient is due for the topics as listed above and wishes to proceed with them.  Appt to be scheduled to perform MWV (Medicare Wellness Visit). Orders placed for Immunization(s) Pneumococcal, Colorectal Cancer Screening: Colonoscopy and Cologuard, Mammogram and Osteoporosis screening.  mwv-welcome to medicare due    Over the last 2 weeks, how often have you been bothered by the following problems?          PHQ2 Score:  1  1. Little interest or pleasure in doing things?:  0  2. Feeling down, depressed, or hopeless?:  1                Pt continues to be confused, restless in bed and pulling at monitoring equipment.  Dr. Lopez states will put in prn meds for agitation.

## 2020-01-29 NOTE — ED NOTES
Pts mom brought back to see pt and talk to him.  Pt still not oriented with bizarre answers to questions.  Mom down plays this saying this is normal.  Mom making an excuse for all his disorientation.  Pt released from restraints.  Pt stands up and given water.  Pt then vomits all over floor.  Pts bed cleaned and pt put back on it.  Pt begans to get drowsy for the first time.  Pt taken to CT while somnolent.  CT complete and pt brought back to room and placed on monitor with family at bedside.

## 2020-01-29 NOTE — DISCHARGE PLANNING
Medical Social Work    Pt is a 34 year old male brought in by [facility] from home after seizure (per report pt doesn't have a history of seizures).  Pt is Christ Calixto (: 1985).  Per [facility] pt's mom and brother were on scene.  Per chart pt's emergency contact is his brother, James ([phone]).      Plan: SW will follow.

## 2020-01-29 NOTE — ED NOTES
Patient attempting to climb out of bed, redirected back into stretcher.  Patient placed in gown and cardiac monitor re-applied. Patient and family at bedside updated re:plan of care.  Patient remains confused, orientated x2.

## 2020-01-30 ENCOUNTER — APPOINTMENT (OUTPATIENT)
Dept: RADIOLOGY | Facility: MEDICAL CENTER | Age: 35
DRG: 065 | End: 2020-01-30
Attending: HOSPITALIST
Payer: MEDICAID

## 2020-01-30 ENCOUNTER — APPOINTMENT (OUTPATIENT)
Dept: RADIOLOGY | Facility: MEDICAL CENTER | Age: 35
DRG: 065 | End: 2020-01-30
Attending: PSYCHIATRY & NEUROLOGY
Payer: MEDICAID

## 2020-01-30 PROBLEM — G47.33 OSA (OBSTRUCTIVE SLEEP APNEA): Status: ACTIVE | Noted: 2020-01-30

## 2020-01-30 LAB
ALBUMIN SERPL BCP-MCNC: 3 G/DL (ref 3.2–4.9)
ALBUMIN/GLOB SERPL: 0.9 G/DL
ALP SERPL-CCNC: 67 U/L (ref 30–99)
ALT SERPL-CCNC: 24 U/L (ref 2–50)
ANION GAP SERPL CALC-SCNC: 8 MMOL/L (ref 0–11.9)
AST SERPL-CCNC: 20 U/L (ref 12–45)
BASOPHILS # BLD AUTO: 0.4 % (ref 0–1.8)
BASOPHILS # BLD: 0.03 K/UL (ref 0–0.12)
BILIRUB SERPL-MCNC: 2.4 MG/DL (ref 0.1–1.5)
BUN SERPL-MCNC: 15 MG/DL (ref 8–22)
CALCIUM SERPL-MCNC: 8.3 MG/DL (ref 8.5–10.5)
CHLORIDE SERPL-SCNC: 107 MMOL/L (ref 96–112)
CO2 SERPL-SCNC: 22 MMOL/L (ref 20–33)
CREAT SERPL-MCNC: 0.8 MG/DL (ref 0.5–1.4)
EOSINOPHIL # BLD AUTO: 0.06 K/UL (ref 0–0.51)
EOSINOPHIL NFR BLD: 0.9 % (ref 0–6.9)
ERYTHROCYTE [DISTWIDTH] IN BLOOD BY AUTOMATED COUNT: 39.1 FL (ref 35.9–50)
GLOBULIN SER CALC-MCNC: 3.4 G/DL (ref 1.9–3.5)
GLUCOSE BLD-MCNC: 213 MG/DL (ref 65–99)
GLUCOSE BLD-MCNC: 225 MG/DL (ref 65–99)
GLUCOSE BLD-MCNC: 237 MG/DL (ref 65–99)
GLUCOSE BLD-MCNC: 307 MG/DL (ref 65–99)
GLUCOSE BLD-MCNC: 316 MG/DL (ref 65–99)
GLUCOSE SERPL-MCNC: 250 MG/DL (ref 65–99)
HCT VFR BLD AUTO: 41.2 % (ref 42–52)
HGB BLD-MCNC: 14.2 G/DL (ref 14–18)
IMM GRANULOCYTES # BLD AUTO: 0.03 K/UL (ref 0–0.11)
IMM GRANULOCYTES NFR BLD AUTO: 0.4 % (ref 0–0.9)
LYMPHOCYTES # BLD AUTO: 1.47 K/UL (ref 1–4.8)
LYMPHOCYTES NFR BLD: 21.4 % (ref 22–41)
MAGNESIUM SERPL-MCNC: 1.8 MG/DL (ref 1.5–2.5)
MCH RBC QN AUTO: 30 PG (ref 27–33)
MCHC RBC AUTO-ENTMCNC: 34.5 G/DL (ref 33.7–35.3)
MCV RBC AUTO: 86.9 FL (ref 81.4–97.8)
MONOCYTES # BLD AUTO: 0.46 K/UL (ref 0–0.85)
MONOCYTES NFR BLD AUTO: 6.7 % (ref 0–13.4)
NEUTROPHILS # BLD AUTO: 4.83 K/UL (ref 1.82–7.42)
NEUTROPHILS NFR BLD: 70.2 % (ref 44–72)
NRBC # BLD AUTO: 0 K/UL
NRBC BLD-RTO: 0 /100 WBC
PLATELET # BLD AUTO: 161 K/UL (ref 164–446)
PMV BLD AUTO: 9.9 FL (ref 9–12.9)
POTASSIUM SERPL-SCNC: 3.4 MMOL/L (ref 3.6–5.5)
PROT SERPL-MCNC: 6.4 G/DL (ref 6–8.2)
RBC # BLD AUTO: 4.74 M/UL (ref 4.7–6.1)
SODIUM SERPL-SCNC: 137 MMOL/L (ref 135–145)
WBC # BLD AUTO: 6.9 K/UL (ref 4.8–10.8)

## 2020-01-30 PROCEDURE — 95819 EEG AWAKE AND ASLEEP: CPT | Mod: 26 | Performed by: PSYCHIATRY & NEUROLOGY

## 2020-01-30 PROCEDURE — 700102 HCHG RX REV CODE 250 W/ 637 OVERRIDE(OP): Performed by: INTERNAL MEDICINE

## 2020-01-30 PROCEDURE — 95819 EEG AWAKE AND ASLEEP: CPT | Performed by: PSYCHIATRY & NEUROLOGY

## 2020-01-30 PROCEDURE — 4A10X4Z MONITORING OF CENTRAL NERVOUS ELECTRICAL ACTIVITY, EXTERNAL APPROACH: ICD-10-PCS | Performed by: PSYCHIATRY & NEUROLOGY

## 2020-01-30 PROCEDURE — A9270 NON-COVERED ITEM OR SERVICE: HCPCS | Performed by: INTERNAL MEDICINE

## 2020-01-30 PROCEDURE — 770001 HCHG ROOM/CARE - MED/SURG/GYN PRIV*

## 2020-01-30 PROCEDURE — 700102 HCHG RX REV CODE 250 W/ 637 OVERRIDE(OP): Performed by: HOSPITALIST

## 2020-01-30 PROCEDURE — 83735 ASSAY OF MAGNESIUM: CPT

## 2020-01-30 PROCEDURE — 70551 MRI BRAIN STEM W/O DYE: CPT

## 2020-01-30 PROCEDURE — 700111 HCHG RX REV CODE 636 W/ 250 OVERRIDE (IP): Performed by: INTERNAL MEDICINE

## 2020-01-30 PROCEDURE — 90686 IIV4 VACC NO PRSV 0.5 ML IM: CPT | Performed by: INTERNAL MEDICINE

## 2020-01-30 PROCEDURE — 90471 IMMUNIZATION ADMIN: CPT

## 2020-01-30 PROCEDURE — 700111 HCHG RX REV CODE 636 W/ 250 OVERRIDE (IP): Performed by: HOSPITALIST

## 2020-01-30 PROCEDURE — 82962 GLUCOSE BLOOD TEST: CPT | Mod: 91

## 2020-01-30 PROCEDURE — 70450 CT HEAD/BRAIN W/O DYE: CPT

## 2020-01-30 PROCEDURE — 99254 IP/OBS CNSLTJ NEW/EST MOD 60: CPT | Mod: 25 | Performed by: PSYCHIATRY & NEUROLOGY

## 2020-01-30 PROCEDURE — 3E02340 INTRODUCTION OF INFLUENZA VACCINE INTO MUSCLE, PERCUTANEOUS APPROACH: ICD-10-PCS | Performed by: INTERNAL MEDICINE

## 2020-01-30 PROCEDURE — 80053 COMPREHEN METABOLIC PANEL: CPT

## 2020-01-30 PROCEDURE — 700105 HCHG RX REV CODE 258: Performed by: INTERNAL MEDICINE

## 2020-01-30 PROCEDURE — A9270 NON-COVERED ITEM OR SERVICE: HCPCS | Performed by: HOSPITALIST

## 2020-01-30 PROCEDURE — 85025 COMPLETE CBC W/AUTO DIFF WBC: CPT

## 2020-01-30 RX ORDER — POTASSIUM CHLORIDE 20 MEQ/1
40 TABLET, EXTENDED RELEASE ORAL ONCE
Status: COMPLETED | OUTPATIENT
Start: 2020-01-30 | End: 2020-01-30

## 2020-01-30 RX ORDER — POTASSIUM CHLORIDE 20 MEQ/1
20 TABLET, EXTENDED RELEASE ORAL ONCE
Status: COMPLETED | OUTPATIENT
Start: 2020-01-30 | End: 2020-01-30

## 2020-01-30 RX ORDER — LEVETIRACETAM 500 MG/1
500 TABLET ORAL 2 TIMES DAILY
Status: DISCONTINUED | OUTPATIENT
Start: 2020-01-30 | End: 2020-01-30

## 2020-01-30 RX ADMIN — INFLUENZA A VIRUS A/BRISBANE/02/2018 IVR-190 (H1N1) ANTIGEN (FORMALDEHYDE INACTIVATED), INFLUENZA A VIRUS A/KANSAS/14/2017 X-327 (H3N2) ANTIGEN (FORMALDEHYDE INACTIVATED), INFLUENZA B VIRUS B/PHUKET/3073/2013 ANTIGEN (FORMALDEHYDE INACTIVATED), AND INFLUENZA B VIRUS B/MARYLAND/15/2016 BX-69A ANTIGEN (FORMALDEHYDE INACTIVATED) 0.5 ML: 15; 15; 15; 15 INJECTION, SUSPENSION INTRAMUSCULAR at 01:04

## 2020-01-30 RX ADMIN — HEPARIN SODIUM 5000 UNITS: 5000 INJECTION, SOLUTION INTRAVENOUS; SUBCUTANEOUS at 05:22

## 2020-01-30 RX ADMIN — INSULIN HUMAN 2 UNITS: 100 INJECTION, SOLUTION PARENTERAL at 01:06

## 2020-01-30 RX ADMIN — SODIUM CHLORIDE 1500 MG: 9 INJECTION, SOLUTION INTRAVENOUS at 10:31

## 2020-01-30 RX ADMIN — POTASSIUM CHLORIDE 20 MEQ: 1500 TABLET, EXTENDED RELEASE ORAL at 17:20

## 2020-01-30 RX ADMIN — HEPARIN SODIUM 5000 UNITS: 5000 INJECTION, SOLUTION INTRAVENOUS; SUBCUTANEOUS at 15:43

## 2020-01-30 RX ADMIN — CHLORTHALIDONE 25 MG: 25 TABLET ORAL at 05:22

## 2020-01-30 RX ADMIN — HEPARIN SODIUM 5000 UNITS: 5000 INJECTION, SOLUTION INTRAVENOUS; SUBCUTANEOUS at 21:09

## 2020-01-30 RX ADMIN — INSULIN HUMAN 4 UNITS: 100 INJECTION, SOLUTION PARENTERAL at 17:25

## 2020-01-30 RX ADMIN — POTASSIUM CHLORIDE 40 MEQ: 1500 TABLET, EXTENDED RELEASE ORAL at 13:11

## 2020-01-30 RX ADMIN — LABETALOL HYDROCHLORIDE 10 MG: 5 INJECTION, SOLUTION INTRAVENOUS at 19:14

## 2020-01-30 RX ADMIN — INSULIN HUMAN 2 UNITS: 100 INJECTION, SOLUTION PARENTERAL at 05:25

## 2020-01-30 RX ADMIN — SENNOSIDES-DOCUSATE SODIUM TAB 8.6-50 MG 2 TABLET: 8.6-5 TAB at 05:21

## 2020-01-30 RX ADMIN — INSULIN HUMAN 2 UNITS: 100 INJECTION, SOLUTION PARENTERAL at 13:17

## 2020-01-30 ASSESSMENT — COGNITIVE AND FUNCTIONAL STATUS - GENERAL
TOILETING: A LITTLE
PERSONAL GROOMING: A LITTLE
MOBILITY SCORE: 20
SUGGESTED CMS G CODE MODIFIER DAILY ACTIVITY: CK
MOVING TO AND FROM BED TO CHAIR: A LOT
DAILY ACTIVITIY SCORE: 18
CLIMB 3 TO 5 STEPS WITH RAILING: A LITTLE
WALKING IN HOSPITAL ROOM: A LITTLE
DRESSING REGULAR UPPER BODY CLOTHING: A LITTLE
DRESSING REGULAR LOWER BODY CLOTHING: A LITTLE
HELP NEEDED FOR BATHING: A LOT
SUGGESTED CMS G CODE MODIFIER MOBILITY: CJ

## 2020-01-30 ASSESSMENT — ENCOUNTER SYMPTOMS
HEADACHES: 0
FOCAL WEAKNESS: 0
SINUS PAIN: 0
POLYDIPSIA: 1
COUGH: 0
NAUSEA: 0
ORTHOPNEA: 0
HALLUCINATIONS: 0
PND: 1
FLANK PAIN: 0
SPEECH CHANGE: 0
SENSORY CHANGE: 0
DOUBLE VISION: 0
PALPITATIONS: 0
BLURRED VISION: 0
FEVER: 0
SORE THROAT: 0
CHILLS: 0
BACK PAIN: 0
ABDOMINAL PAIN: 0
VOMITING: 0
SHORTNESS OF BREATH: 0
SPUTUM PRODUCTION: 0

## 2020-01-30 NOTE — CONSULTS
"Hospital Neurology Consult:    Referring Physician: Lenny Mackey M.D.    Reason for consultation: \"seizures\"    HPI: Christ Calixto is a 34 y.o. male with history of hypertension, morbid obesity presenting to the hospital for possible seizure and consulted for seizure and agitation.  History was obtained mostly from the chart as the patient is a very poor historian.  Of note, no personnel that witnessed the \"seizure-like event\" or agitation was available to provide information about the events.    Patient presented on 1/29/2020 with encephalopathy and possible seizure.  He was found altered at home with \"generalized shaking\" and he was given Versed by EMS.  He was also noted to become combative and came to the emergency department for further evaluation.  In the emergency department he was given Ativan with some improvement.  CT head without contrast was negative for any acute pathology and EEG was normal.  Due to agitation, the patient was transferred to the ICU for further management.  In the morning, he is now back to baseline.  I asked the patient if he remembers anything about these events and he declines.  He did not bite his tongue, he did not lose urine.  Furthermore, he does not have any history of seizures or family history of epilepsy.  He does not have any significant history of head trauma.  He does not endorse any undue stress or lack of sleep.  Currently, he has no other complaints.    ROS:     As above. All other systems reviewed and are negative.    Past Medical History:    has a past medical history of Hypertension and Obesities, morbid (HCC).    FHx:  family history includes Diabetes in his father.    SHx:   reports that he has been smoking cigarettes. He has a 4.00 pack-year smoking history. He has never used smokeless tobacco. He reports current drug use. He reports that he does not drink alcohol.    Allergies:  No Known Allergies    Medications:    Current Facility-Administered Medications: "   •  potassium chloride SA (Kdur) tablet 40 mEq, 40 mEq, Oral, Once, Amos Adams M.D.  •  potassium chloride SA (Kdur) tablet 20 mEq, 20 mEq, Oral, Once, Amos Adams M.D.  •  senna-docusate (PERICOLACE or SENOKOT S) 8.6-50 MG per tablet 2 Tab, 2 Tab, Oral, BID, 2 Tab at 01/30/20 0521 **AND** polyethylene glycol/lytes (MIRALAX) PACKET 1 Packet, 1 Packet, Oral, QDAY PRN **AND** magnesium hydroxide (MILK OF MAGNESIA) suspension 30 mL, 30 mL, Oral, QDAY PRN **AND** bisacodyl (DULCOLAX) suppository 10 mg, 10 mg, Rectal, QDAY PRN, Vianney Gore M.D.  •  heparin injection 5,000 Units, 5,000 Units, Subcutaneous, Q8HRS, Vianney Gore M.D., 5,000 Units at 01/30/20 0522  •  ondansetron (ZOFRAN) syringe/vial injection 4 mg, 4 mg, Intravenous, Q4HRS PRN, Vianney Gore M.D.  •  ondansetron (ZOFRAN ODT) dispertab 4 mg, 4 mg, Oral, Q4HRS PRN, Vianney Gore M.D.  •  promethazine (PHENERGAN) tablet 12.5-25 mg, 12.5-25 mg, Oral, Q4HRS PRN, Vianney Gore M.D.  •  promethazine (PHENERGAN) suppository 12.5-25 mg, 12.5-25 mg, Rectal, Q4HRS PRN, Vianney Gore M.D.  •  prochlorperazine (COMPAZINE) injection 5-10 mg, 5-10 mg, Intravenous, Q4HRS PRN, Vianney Gore M.D.  •  insulin regular (HUMULIN R) injection 1-6 Units, 1-6 Units, Subcutaneous, Q6HRS, 2 Units at 01/30/20 0525 **AND** Accu-Chek Q6 if NPO, , , Q6H **AND** NOTIFY MD and PharmD, , , Once **AND** glucose 4 g chewable tablet 16 g, 16 g, Oral, Q15 MIN PRN **AND** DEXTROSE 10% BOLUS 250 mL, 250 mL, Intravenous, Q15 MIN PRN, Vianney Gore M.D.  •  chlorthalidone (HYGROTON) tablet 25 mg, 25 mg, Oral, DAILY, Vianney Gore M.D., 25 mg at 01/30/20 0522  •  haloperidol lactate (HALDOL) injection 2-5 mg, 2-5 mg, Intravenous, Q4HRS PRN, Vianney Gore M.D., 5 mg at 01/29/20 1358  •  labetalol (NORMODYNE/TRANDATE) injection 10-20 mg, 10-20 mg, Intravenous, Q6HRS PRN, Logan Farris M.D., 5 mg at 01/29/20 2210  •  hydrALAZINE (APRESOLINE)  injection 10-20 mg, 10-20 mg, Intravenous, Q6HRS PRN, Logan Farris M.D.  •  enalaprilat (VASOTEC) injection 1.25 mg, 1.25 mg, Intravenous, Q6HRS PRN, Logan Farris M.D.  •  LORazepam (ATIVAN) injection 2 mg, 2 mg, Intravenous, Q30 MIN PRN, Logan Farris M.D.    Vitals:   Vitals:    01/30/20 0300 01/30/20 0400 01/30/20 0500 01/30/20 0600   BP: 137/65 141/67 143/79 149/83   Pulse: 87 88 87 85   Resp: (!) 32 (!) 36 (!) 35 (!) 39   Temp:  37.2 °C (98.9 °F)  36 °C (96.8 °F)   TempSrc:       SpO2: 97% 90% 99% 99%   Weight:       Height:           Labs:  No results found for: PROTHROMBTM, INR   Lab Results   Component Value Date/Time    WBC 6.9 01/30/2020 03:40 AM    RBC 4.74 01/30/2020 03:40 AM    HEMOGLOBIN 14.2 01/30/2020 03:40 AM    HEMATOCRIT 41.2 (L) 01/30/2020 03:40 AM    MCV 86.9 01/30/2020 03:40 AM    MCH 30.0 01/30/2020 03:40 AM    MCHC 34.5 01/30/2020 03:40 AM    MPV 9.9 01/30/2020 03:40 AM    NEUTSPOLYS 70.20 01/30/2020 03:40 AM    LYMPHOCYTES 21.40 (L) 01/30/2020 03:40 AM    MONOCYTES 6.70 01/30/2020 03:40 AM    EOSINOPHILS 0.90 01/30/2020 03:40 AM    BASOPHILS 0.40 01/30/2020 03:40 AM      Lab Results   Component Value Date/Time    SODIUM 137 01/30/2020 03:40 AM    POTASSIUM 3.4 (L) 01/30/2020 03:40 AM    CHLORIDE 107 01/30/2020 03:40 AM    CO2 22 01/30/2020 03:40 AM    GLUCOSE 250 (H) 01/30/2020 03:40 AM    BUN 15 01/30/2020 03:40 AM    CREATININE 0.80 01/30/2020 03:40 AM      Lab Results   Component Value Date/Time    CHOLSTRLTOT 155 07/24/2014 11:52 AM    LDL 94 07/24/2014 11:52 AM    HDL 28 (A) 07/24/2014 11:52 AM    TRIGLYCERIDE 165 (H) 07/24/2014 11:52 AM       Lab Results   Component Value Date/Time    ALKPHOSPHAT 67 01/30/2020 03:40 AM    ASTSGOT 20 01/30/2020 03:40 AM    ALTSGPT 24 01/30/2020 03:40 AM    TBILIRUBIN 2.4 (H) 01/30/2020 03:40 AM        Lab Results   Component Value Date/Time    FREET4 0.96 01/29/2020 02:00 AM    FREET4 1.13 09/02/2014 12:40 PM  "        Imaging/Testing:  CT head without contrast on 1/29/2020 was personally reviewed and was within normal limits.    No other imaging to review.    Physical Exam:     General: Well-appearing 34-year-old male in no acute distress.  Cardio: Normal S1/S2. No peripheral edema.   Pulm: CTAX2. No respiratory distress.   Skin: Warm, dry, no rashes or lesions   Psychiatric: Appropriate affect. No active psychosis.  HEENT: Atraumatic head, normal sclera and conjunctiva, moist oral mucosa. No lid lag.  Abdomen: Soft, non tender. No masses or hepatosplenomegaly.    Neurologic:  Mental Status:  AAOx4. Able to follow commands/cross midline. Speech fluent/nondysarthric. Language functions intact. No neglect/apraxia.  Cranial Nerves:  PERRL. EOMi. Face symmetric, palate/tongue midline. Visual fields full to confrontation. Facial sensation intact.   Motor:  Normal muscle tone and bulk. Strength is 5/5 throughout. No abnormal movements.  Reflexes: Deferred  Coordination: Finger-to-nose without ataxia  Sensation: Normal to light touch throughout  Gait/Station: Deferred    Assessment/Plan:    Christ Calixto is a 34 y.o. male with history of hypertension, morbid obesity presenting to the hospital for possible seizure and consulted for seizure and agitation.  At this time, I do not have any good description of what the event in question was like.  Namely, there is no available description of the \"seizure-like activity\" nor of his state while he was agitated.  In this regard, given normal brain imaging and normal EEG I do not have any conclusive evidence to believe that the patient had a seizure or has epilepsy.  Even if this was the case, this would count is 2 discrete events within 24 hours and would count therefore as one unprovoked seizure.  This may not necessarily warrant initiation of antiseizure medications.  At this time, we will obtain an MRI and a repeat EEG.    Plan:  1.  Obtain MRI of the brain with and without " contrast  2.  Obtain repeat EEG  3.  Discontinued Keppra for now.  Will reassess need for antiseizure medications after work-up is complete  4.  We will follow with above.  5.  Plan discussed with consulting physician and patient's nurse.       Olaf Baker M.D., Diplomat of the American Board of Psychiatry and Neurology  Diplomat of [facility]   Assistant Clinical Professor, [facility] Neurology Consultant

## 2020-01-30 NOTE — ASSESSMENT & PLAN NOTE
Query seizure, lactic acidosis perhaps consistent, severe agitation could yield the same  CT negative  Exam negative  EEG negative  Metabolic studies negative for etiology for possible seizure  MRI ordered  May need neurological consultation  Seizure precautions  Aspiration precautions  PRN Ativan for seizure, and if seizures do occur load with Keppra  Agree with ER physician and hospitalist with negative EEG and CT and now with normalized exam hold on antiseizure meds but monitor in the ICU initially

## 2020-01-30 NOTE — CARE PLAN
Problem: Safety  Goal: Will remain free from injury  Outcome: PROGRESSING AS EXPECTED   Restraints discontinued. Safety plan discussed with pt, verbalizes understanding  Problem: Venous Thromboembolism (VTW)/Deep Vein Thrombosis (DVT) Prevention:  Goal: Patient will participate in Venous Thrombosis (VTE)/Deep Vein Thrombosis (DVT)Prevention Measures  Outcome: PROGRESSING AS EXPECTED   Mechanical and pharm methods of prevention are in place

## 2020-01-30 NOTE — ASSESSMENT & PLAN NOTE
Unclear etiology, resolved by time I arrived  Agitation was quite severe requiring intravenous medications and four-point restraint  On arrival to ICU restraints were removed and the patient's been clinically appropriate so far

## 2020-01-30 NOTE — PROGRESS NOTES
ICU progress note    Uneventful night. Awake, alert, oriented, no further seizures.    Plan  - EEG  - Consider MRI (await neuro input)  - Load with Keppra  - Neuro consult  - OK to longo today  - Please re-consult critical care prn

## 2020-01-30 NOTE — ASSESSMENT & PLAN NOTE
Clinical history very positive, no prior polysomnogram per patient  BMI elevated enough that OHS may be present?  Monitor for JAYLIN and nocturnal/sleep-related desats  Consider empiric AVAPS-auto BiPAP for significant hypoxemia  Outpatient polysomnogram/CPAP titration  Weight loss encouraged  Longstanding excessive hypersomnolence may contribute to his encephalopathy/agitation?

## 2020-01-30 NOTE — ED NOTES
Call returned from , advised to contact ER MD or  re:patient as he was in a code and could not come see patient at this time.

## 2020-01-30 NOTE — ED NOTES
[facility] ICU called, per KENDRA Myles, awaiting bariatric bed. Per Shar will come down and transport pt.

## 2020-01-30 NOTE — ASSESSMENT & PLAN NOTE
Blood sugar over 400 and glycohemoglobin 12.7  Serial blood sugar  Sliding scale insulin  Initiate long-acting therapy in a.m.  Diabetic education  Nutritional consult  Weight loss encouraged

## 2020-01-30 NOTE — DIETARY
NUTRITION SERVICES:  Pt with BMI 53.66. Morbid obesity. Weight loss counseling not appropriate in acute care setting.     RECOMMENDATION: Referral to outpatient nutrition services for weight management after D/C.

## 2020-01-30 NOTE — ASSESSMENT & PLAN NOTE
Secondary lactic acidosis with initial lactate greater than 7  Serial follow-up lactic acid levels have normalized  Follow-up BMP in a.m.  Hypoperfusion or sepsis not suspected, no further evaluation but close monitoring in the ICU seems prudent

## 2020-01-30 NOTE — ED NOTES
Dr. Farris to bedside to assess pt. States to have ICU RN text him on arrival to ICU to trail restraint removal. Pt aware and agrees to plan.

## 2020-01-30 NOTE — PROGRESS NOTES
Dr. Adams to put in transfer orders to floor without cardiac monitoring. OK. To be off monitor for MRI.

## 2020-01-30 NOTE — ED NOTES
Patient attempting to remove restraints via pulling against rails.  Cardiac monitor and pulse ox removed by patient. Patient yelling at staff, cut them off.  Patient restrains could not be removed, but repositions.  Security called to bedside for assistance. Patient repositioned.

## 2020-01-30 NOTE — PROGRESS NOTES
Pt to S-122 with ACLS RN and security at bedside.  Pt calm and cooperative, A&Ox4.   Hard restraints discontinued with security    Two RN skin check completed. No signs of breakdown.   Redness on L temporal region.   Slight redness on tongue, cleansed with CHG oral care.  P sacral mepilex placed   Skin discoloration under pannus.  Pt cleansed with CHg and placed on new sheets  Slight resolving blanchable redness underneath hard restraints    Pillows placed under feet and head. Pt turns self side to side.

## 2020-01-30 NOTE — CONSULTS
Critical Care Consultation    Date of consult: 1/29/2020    Referring Physician  Constantino Lopez D.O.    Reason for Consultation  Agitation, hypertension, possible JAYLIN    History of Presenting Illness  34 y.o. male with a history of morbid obesity, diabetes and hypertension who presented 1/29/2020 with encephalopathy and possible seizure.  Patient altered this a.m. and EMS contacted by family for generalized shaking and concern of possible seizure.  EMS administered 5 mg IM Versed for possible seizure and fortunately he became combative and required restraints shortly thereafter and was not oriented.  On arrival in the emergency room he was given IV Ativan 4 mg with mild improvement.  CT scan brain was performed and was negative for any acute pathology.  EEG was performed and there was no epileptiform activity noted.  MRI was ordered but is pending.  Metabolic work-up initiated.  There was nothing to suggest meningitis and LP was not performed.  Agitation persisted and I was consulted to assist in management in the ICU.  Patient agitation bad enough he almost knocked over his gurney and this required four-point leather restraints.  Patient is a large man nearly 400 pounds and this contributed to this problem.  Hypertension also was a problem.  Blood pressure at times is over 200 systolic and PRN medicines were initiated.  No seizure activity noted in approximately half a day time between his arrival and when I was asked see the patient because he was too agitated to go to the medical floor.  Of note when I arrived the patient was appropriate, followed well, GCS 15, ALEKSANDR score 0 and had a nonfocal neuro exam.    Code Status  Full Code    Review of Systems  Review of Systems   Constitutional: Positive for malaise/fatigue. Negative for chills and fever.   HENT: Negative for nosebleeds, sinus pain and sore throat.    Eyes: Negative for blurred vision and double vision.   Respiratory: Negative for cough, sputum production  and shortness of breath.    Cardiovascular: Positive for PND. Negative for chest pain, palpitations and orthopnea.   Gastrointestinal: Negative for abdominal pain, nausea and vomiting.   Genitourinary: Negative for dysuria, flank pain and hematuria.   Musculoskeletal: Negative for back pain and joint pain.        Complaining of discomfort from restraints while in the ER   Neurological: Negative for sensory change, speech change, focal weakness and headaches.   Endo/Heme/Allergies: Positive for polydipsia.   Psychiatric/Behavioral: Negative for hallucinations and suicidal ideas.   Sleep: Positive for EDS, loud snoring and witnessed apnea.    Past Medical History   has a past medical history of Hypertension and Obesities, morbid (HCC).    Surgical History   has no past surgical history on file.    Family History  family history includes Diabetes in his father.    Social History   reports that he has been smoking cigarettes. He has a 4.00 pack-year smoking history. He has never used smokeless tobacco. He reports current drug use. He reports that he does not drink alcohol.    Medications  Home Medications     Reviewed by Fred Herrmann (Pharmacy Tech) on 01/29/20 at 0802  Med List Status: Complete   Medication Last Dose Status        Patient Aamir Taking any Medications                     Current Facility-Administered Medications   Medication Dose Route Frequency Provider Last Rate Last Dose   • senna-docusate (PERICOLACE or SENOKOT S) 8.6-50 MG per tablet 2 Tab  2 Tab Oral BID Vianney Gore M.D.   Stopped at 01/29/20 0645    And   • polyethylene glycol/lytes (MIRALAX) PACKET 1 Packet  1 Packet Oral QDAY PRN Vianney Gore M.D.        And   • magnesium hydroxide (MILK OF MAGNESIA) suspension 30 mL  30 mL Oral QDAY PRN Vianney Gore M.D.        And   • bisacodyl (DULCOLAX) suppository 10 mg  10 mg Rectal QDAY PRN Vianney Gore M.D.       • NS infusion   Intravenous Continuous Vianney Gore M.D.  100 mL/hr at 01/29/20 2226     • heparin injection 5,000 Units  5,000 Units Subcutaneous Q8HRS Vianney Gore M.D.   5,000 Units at 01/29/20 2210   • ondansetron (ZOFRAN) syringe/vial injection 4 mg  4 mg Intravenous Q4HRS PRN Vianney Gore M.D.       • ondansetron (ZOFRAN ODT) dispertab 4 mg  4 mg Oral Q4HRS PRN Vianney Gore M.D.       • promethazine (PHENERGAN) tablet 12.5-25 mg  12.5-25 mg Oral Q4HRS PRN Vianney Gore M.D.       • promethazine (PHENERGAN) suppository 12.5-25 mg  12.5-25 mg Rectal Q4HRS PRN Vianney Gore M.D.       • prochlorperazine (COMPAZINE) injection 5-10 mg  5-10 mg Intravenous Q4HRS PRN Vianney Gore M.D.       • insulin regular (HUMULIN R) injection 1-6 Units  1-6 Units Subcutaneous Q6HRS Vianney Gore M.D.   2 Units at 01/30/20 0106    And   • glucose 4 g chewable tablet 16 g  16 g Oral Q15 MIN PRN Vianney Gore M.D.        And   • DEXTROSE 10% BOLUS 250 mL  250 mL Intravenous Q15 MIN PRN Vianney Gore M.D.       • chlorthalidone (HYGROTON) tablet 25 mg  25 mg Oral DAILY Vianney Gore M.D.   Stopped at 01/29/20 0645   • haloperidol lactate (HALDOL) injection 2-5 mg  2-5 mg Intravenous Q4HRS PRN Vianney Gore M.D.   5 mg at 01/29/20 1358   • labetalol (NORMODYNE/TRANDATE) injection 10-20 mg  10-20 mg Intravenous Q6HRS PRN Logan Farris M.D.   5 mg at 01/29/20 2210   • hydrALAZINE (APRESOLINE) injection 10-20 mg  10-20 mg Intravenous Q6HRS PRN Logan Farris M.D.       • enalaprilat (VASOTEC) injection 1.25 mg  1.25 mg Intravenous Q6HRS PRN Logan Farris M.D.       • LORazepam (ATIVAN) injection 2 mg  2 mg Intravenous Q30 MIN PRN Logan Farris M.D.           Allergies  No Known Allergies    Vital Signs last 24 hours  Temp:  [36.4 °C (97.6 °F)-36.9 °C (98.4 °F)] 36.6 °C (97.9 °F)  Pulse:  [] 98  Resp:  [15-30] 27  BP: (135-212)/() 202/86  SpO2:  [86 %-98 %] 96 %    Physical Exam  Physical Exam  Constitutional:        Appearance: He is morbidly obese. He is not toxic-appearing or diaphoretic.   Cardiovascular:      Rate and Rhythm: Regular rhythm. Tachycardia present.  No extrasystoles are present.     Heart sounds: Heart sounds are distant. No murmur. No gallop.    Pulmonary:      Effort: No respiratory distress.      Breath sounds: No wheezing, rhonchi or rales.   Abdominal:      General: Abdomen is protuberant. Bowel sounds are normal.      Palpations: Abdomen is soft. There is no fluid wave.      Tenderness: There is no tenderness. There is no right CVA tenderness, left CVA tenderness or guarding. Negative signs include Curran's sign.   Musculoskeletal:      Right lower leg: No edema.      Left lower leg: No edema.   Skin:     Capillary Refill: Capillary refill takes less than 2 seconds.      Coloration: Skin is not cyanotic.      Nails: There is no clubbing.     Neurological:      General: No focal deficit present.      Mental Status: He is alert and oriented to person, place, and time.      GCS: GCS eye subscore is 4. GCS verbal subscore is 5. GCS motor subscore is 6.      Cranial Nerves: Cranial nerves are intact.      Sensory: Sensation is intact.      Motor: Motor function is intact.      Comments: Followed well and was appropriate, multiple follow-up exams performed and he was unchanged, this contrasted significantly with reports early of severe agitation requiring restraints and IV medications.  No seizure activity.   Psychiatric:         Attention and Perception: Attention normal.         Mood and Affect: Mood normal. Mood is not anxious.         Speech: Speech normal.         Behavior: Behavior normal. Behavior is not agitated. Behavior is cooperative.         Thought Content: Thought content normal.         Cognition and Memory: Cognition normal.         Fluids    Intake/Output Summary (Last 24 hours) at 1/30/2020 0133  Last data filed at 1/29/2020 2200  Gross per 24 hour   Intake 1200 ml   Output --   Net 1200 ml        Laboratory  Recent Results (from the past 48 hour(s))   CBC WITH DIFFERENTIAL    Collection Time: 01/29/20  2:00 AM   Result Value Ref Range    WBC 7.1 4.8 - 10.8 K/uL    RBC 5.84 4.70 - 6.10 M/uL    Hemoglobin 16.9 14.0 - 18.0 g/dL    Hematocrit 50.5 42.0 - 52.0 %    MCV 86.5 81.4 - 97.8 fL    MCH 28.9 27.0 - 33.0 pg    MCHC 33.5 (L) 33.7 - 35.3 g/dL    RDW 38.0 35.9 - 50.0 fL    Platelet Count 201 164 - 446 K/uL    MPV 10.0 9.0 - 12.9 fL    Neutrophils-Polys 77.00 (H) 44.00 - 72.00 %    Lymphocytes 16.50 (L) 22.00 - 41.00 %    Monocytes 4.50 0.00 - 13.40 %    Eosinophils 0.40 0.00 - 6.90 %    Basophils 0.70 0.00 - 1.80 %    Immature Granulocytes 0.90 0.00 - 0.90 %    Nucleated RBC 0.00 /100 WBC    Neutrophils (Absolute) 5.43 1.82 - 7.42 K/uL    Lymphs (Absolute) 1.16 1.00 - 4.80 K/uL    Monos (Absolute) 0.32 0.00 - 0.85 K/uL    Eos (Absolute) 0.03 0.00 - 0.51 K/uL    Baso (Absolute) 0.05 0.00 - 0.12 K/uL    Immature Granulocytes (abs) 0.06 0.00 - 0.11 K/uL    NRBC (Absolute) 0.00 K/uL   COMP METABOLIC PANEL    Collection Time: 01/29/20  2:00 AM   Result Value Ref Range    Sodium 135 135 - 145 mmol/L    Potassium 4.0 3.6 - 5.5 mmol/L    Chloride 101 96 - 112 mmol/L    Co2 17 (L) 20 - 33 mmol/L    Anion Gap 17.0 (H) 0.0 - 11.9    Glucose 421 (H) 65 - 99 mg/dL    Bun 17 8 - 22 mg/dL    Creatinine 1.06 0.50 - 1.40 mg/dL    Calcium 9.3 8.5 - 10.5 mg/dL    AST(SGOT) 29 12 - 45 U/L    ALT(SGPT) 37 2 - 50 U/L    Alkaline Phosphatase 102 (H) 30 - 99 U/L    Total Bilirubin 1.5 0.1 - 1.5 mg/dL    Albumin 3.8 3.2 - 4.9 g/dL    Total Protein 8.7 (H) 6.0 - 8.2 g/dL    Globulin 4.9 (H) 1.9 - 3.5 g/dL    A-G Ratio 0.8 g/dL   LACTIC ACID    Collection Time: 01/29/20  2:00 AM   Result Value Ref Range    Lactic Acid 7.2 (HH) 0.5 - 2.0 mmol/L   CARBOXYHEMOGLOBIN (carbon monoxide)    Collection Time: 01/29/20  2:00 AM   Result Value Ref Range    Carbon Monoxide-Co 2.40 0.00 - 4.90 %   ESTIMATED GFR    Collection Time: 01/29/20   2:00 AM   Result Value Ref Range    GFR If African American >60 >60 mL/min/1.73 m 2    GFR If Non African American >60 >60 mL/min/1.73 m 2   DIAGNOSTIC ALCOHOL    Collection Time: 01/29/20  2:00 AM   Result Value Ref Range    Diagnostic Alcohol 0.00 0.00 g/dL   TSH    Collection Time: 01/29/20  2:00 AM   Result Value Ref Range    TSH 2.260 0.380 - 5.330 uIU/mL   FREE THYROXINE    Collection Time: 01/29/20  2:00 AM   Result Value Ref Range    Free T-4 0.96 0.53 - 1.43 ng/dL   LACTIC ACID    Collection Time: 01/29/20  3:35 AM   Result Value Ref Range    Lactic Acid 2.6 (H) 0.5 - 2.0 mmol/L   URINALYSIS    Collection Time: 01/29/20  5:50 AM   Result Value Ref Range    Color Yellow     Character Clear     Ph 5.0 5.0 - 8.0    Glucose >=1000 (A) Negative mg/dL    Ketones Trace (A) Negative mg/dL    Protein 30 (A) Negative mg/dL    Bilirubin Negative Negative    Urobilinogen, Urine 1.0 Negative    Nitrite Negative Negative    Leukocyte Esterase Negative Negative    Occult Blood Negative Negative    Micro Urine Req Microscopic    URINE DRUG SCREEN    Collection Time: 01/29/20  5:50 AM   Result Value Ref Range    Amphetamines Urine Negative Negative    Barbiturates Negative Negative    Benzodiazepines Positive (A) Negative    Cocaine Metabolite Negative Negative    Methadone Negative Negative    Opiates Negative Negative    Oxycodone Negative Negative    Phencyclidine -Pcp Negative Negative    Propoxyphene Negative Negative    Cannabinoid Metab Negative Negative   REFRACTOMETER SG    Collection Time: 01/29/20  5:50 AM   Result Value Ref Range    Specific Gravity >1.050    URINE MICROSCOPIC (W/UA)    Collection Time: 01/29/20  5:50 AM   Result Value Ref Range    WBC 2-5 (A) /hpf    RBC 0-2 (A) /hpf    Bacteria Negative None /hpf    Epithelial Cells Few /hpf    Hyaline Cast 0-2 /lpf    Budding Yeast Present (A) Absent /hpf   AMMONIA    Collection Time: 01/29/20  2:45 PM   Result Value Ref Range    Ammonia 38 11 - 45 umol/L    VITAMIN B12    Collection Time: 01/29/20  2:45 PM   Result Value Ref Range    Vitamin B12 -True Cobalamin 493 211 - 911 pg/mL   HIV AG/AB COMBO ASSAY SCREENING    Collection Time: 01/29/20  2:45 PM   Result Value Ref Range    HIV Ag/Ab Combo Assay Non Reactive Non Reactive   T.PALLIDUM AB EIA    Collection Time: 01/29/20  2:45 PM   Result Value Ref Range    Syphilis, Treponemal Qual Non Reactive Non Reactive   LACTIC ACID    Collection Time: 01/29/20  2:45 PM   Result Value Ref Range    Lactic Acid 1.7 0.5 - 2.0 mmol/L   Creatine Kinase (CPK)    Collection Time: 01/29/20  2:45 PM   Result Value Ref Range    CPK Total 168 (H) 0 - 154 U/L   Prolactin    Collection Time: 01/29/20  2:45 PM   Result Value Ref Range    Prolactin 19.26 (H) 2.10 - 17.70 ng/mL   BETA-HYDROXYBUTYRIC ACID    Collection Time: 01/29/20  2:45 PM   Result Value Ref Range    beta-Hydroxybutyric Acid 0.25 0.02 - 0.27 mmol/L   HEMOGLOBIN A1C    Collection Time: 01/29/20  2:45 PM   Result Value Ref Range    Glycohemoglobin 12.7 (H) 0.0 - 5.6 %    Est Avg Glucose 318 mg/dL   OSMOLALITY SERUM    Collection Time: 01/29/20  2:45 PM   Result Value Ref Range    Osmolality Serum 297 278 - 298 mOsm/kg H2O       Imaging  CT-HEAD W/O   Final Result      No evidence of acute intracranial process.      MR-BRAIN-W/O    (Results Pending)       Assessment/Plan  * Encephalopathy  Assessment & Plan  Query seizure, lactic acidosis perhaps consistent, severe agitation could yield the same  CT negative  Exam negative  EEG negative  Metabolic studies negative for etiology for possible seizure  MRI ordered  May need neurological consultation  Seizure precautions  Aspiration precautions  PRN Ativan for seizure, and if seizures do occur load with Keppra  Agree with ER physician and hospitalist with negative EEG and CT and now with normalized exam hold on antiseizure meds but monitor in the ICU initially    HTN (hypertension)- (present on admission)  Assessment &  Plan  Known hypertension, query noncompliance with therapy?  Suspect blood pressures been high for prolonged period  CT head fortunately negative  I do not believe hypertensive encephalopathy is the etiology here, ongoing evaluation  Hydralazine/labetalol/enalapril IV as needed  Monitor for the need to initiate IV nicardipine infusion  CVC/arterial line if clinically appropriate  Use goal  initially since he may very well been very hypertensive for extended period time  Drop goal to 140-160 after 24-48 hours  Old records/history from family Re: BP med use would be helpful  Weight loss encouraged  Compliance encouraged  Untreated JAYLIN may be contributing to significant hypertension    JAYLIN (obstructive sleep apnea)  Assessment & Plan  Clinical history very positive, no prior polysomnogram per patient  BMI elevated enough that OHS may be present?  Monitor for JAYLIN and nocturnal/sleep-related desats  Consider empiric AVAPS-auto BiPAP for significant hypoxemia  Outpatient polysomnogram/CPAP titration  Weight loss encouraged  Longstanding excessive hypersomnolence may contribute to his encephalopathy/agitation?    Agitation  Assessment & Plan  Unclear etiology, resolved by time I arrived  Agitation was quite severe requiring intravenous medications and four-point restraint  On arrival to ICU restraints were removed and the patient's been clinically appropriate so far    DM (diabetes mellitus) ([facility])  Assessment & Plan  Blood sugar over 400 and glycohemoglobin 12.7  Serial blood sugar  Sliding scale insulin  Initiate long-acting therapy in a.m.  Diabetic education  Nutritional consult  Weight loss encouraged    High anion gap metabolic acidosis  Assessment & Plan  Secondary lactic acidosis with initial lactate greater than 7  Serial follow-up lactic acid levels have normalized  Follow-up BMP in a.m.  Hypoperfusion or sepsis not suspected, no further evaluation but close monitoring in the ICU seems prudent    Morbid  obesity (HCC)- (present on admission)  Assessment & Plan  Weight loss encouraged  Patient may benefit from nutrition consult prior to discharge      Discussed patient condition and risk of morbidity and/or mortality with Hospitalist, RN, RT, Patient and Charge nurse.      The patient remains critically ill.  Critical care time = 50 minutes in directly providing and coordinating critical care and extensive data review.  No time overlap and excludes procedures.

## 2020-01-30 NOTE — ED NOTES
Assumed care of pt at this time, pt remains in 4 point NV restraints d/c pulling out lines/tubes and inability to follow instructions.

## 2020-01-30 NOTE — ED NOTES
Patient attempting to climb out of bed, tipping stretcher on two wheels. Nurses x 3 assisted patient back into stretcher.  Security called to bedside.  Patient placed in four point medical restraints. Dr.Brock mcnally.

## 2020-01-30 NOTE — PROGRESS NOTES
Patient admitted this morning by my colleague.  I was called by RN several times today for concern of the patient being altered and attempting to climb out of bed.  Patient had been given Haldol but per nursing made him only more agitated.  I had given the patient Ativan with decrease in agitation per RN.  RN states patient was requiring four-point restraints.  Patient was at risk of tipping over his gurney.  Due to acute agitation and high needs is recommend that he goes to the intensive care unit.  I have updated intensivist Dr. Logan Farris.

## 2020-01-30 NOTE — PROCEDURES
ROUTINE ELECTROENCEPHALOGRAM REPORT      Referring provider: Dr. Landeros.    DOS:  1/30/2020 (total recording of 21 minutes)    INDICATION:  Christ Calixto 34 y.o. male presenting with altered mental status.     CURRENT ANTIEPILEPTIC REGIMEN: None.     TECHNIQUE: 30 channel routine electroencephalogram (EEG) was performed in accordance with the international 10-20 system. The study was reviewed in bipolar and referential montages. The recording examined the patient during wakeful and drowsy/sleep state(s).     DESCRIPTION OF THE RECORD:  During the wakefulness, the background showed a symmetrical 8 Hz alpha activity posteriorly with amplitude of 70 mV.  There was reactivity to eye closure/opening.  A normal anterior-posterior gradient was noted with faster beta frequencies seen anteriorly.  During drowsiness, theta/delta frequencies were seen.    During the sleep state, background shows diffuse high-amplitude 4-5 Hz delta activity.  Symmetrical high-amplitude sleep spindles and vertex sharps were seen in the leads over the central regions.     ACTIVATION PROCEDURES:   Not performed.     ICTAL AND/OR INTERICTAL FINDINGS:   No focal or generalized epileptiform activity noted. No regional slowing was seen during this routine study.  No clinical events or seizures were reported or recorded during the study.     EKG: sampling of the EKG recording demonstrated sinus rhythm.       INTERPRETATION:  This is a normal routine EEG recording in the awake, drowsy, and sleep state(s).  Clinical correlation is recommended.    Note: A normal EEG does not rule out epilepsy.  If the clinical suspicion remains high for seizures, a prolonged recording to capture clinical or subclinical events may be helpful.        Phillip Elizabeth MD   Epilepsy and Neurodiagnostics.   Clinical  of Neurology [facility].   Diplomate in Neurology, Epilepsy, and Electrodiagnostic Medicine.    Office: [phone]  Fax: [phone]

## 2020-01-30 NOTE — ASSESSMENT & PLAN NOTE
Known hypertension, query noncompliance with therapy?  Suspect blood pressures been high for prolonged period  CT head fortunately negative  I do not believe hypertensive encephalopathy is the etiology here, ongoing evaluation  Hydralazine/labetalol/enalapril IV as needed  Monitor for the need to initiate IV nicardipine infusion  CVC/arterial line if clinically appropriate  Use goal  initially since he may very well been very hypertensive for extended period time  Drop goal to 140-160 after 24-48 hours  Old records/history from family Re: BP med use would be helpful  Weight loss encouraged  Compliance encouraged  Untreated JAYLIN may be contributing to significant hypertension

## 2020-01-31 ENCOUNTER — APPOINTMENT (OUTPATIENT)
Dept: CARDIOLOGY | Facility: MEDICAL CENTER | Age: 35
DRG: 065 | End: 2020-01-31
Attending: HOSPITALIST
Payer: MEDICAID

## 2020-01-31 ENCOUNTER — APPOINTMENT (OUTPATIENT)
Dept: RADIOLOGY | Facility: MEDICAL CENTER | Age: 35
DRG: 065 | End: 2020-01-31
Attending: NURSE PRACTITIONER
Payer: MEDICAID

## 2020-01-31 PROBLEM — I63.9 CEREBROVASCULAR ACCIDENT (CVA) DUE TO VASCULAR OCCLUSION (HCC): Status: ACTIVE | Noted: 2020-01-31

## 2020-01-31 LAB
GLUCOSE BLD-MCNC: 169 MG/DL (ref 65–99)
GLUCOSE BLD-MCNC: 185 MG/DL (ref 65–99)
GLUCOSE BLD-MCNC: 274 MG/DL (ref 65–99)
GLUCOSE BLD-MCNC: 314 MG/DL (ref 65–99)
LV EJECT FRACT  99904: 60
LV EJECT FRACT MOD 4C 99902: 47.16

## 2020-01-31 PROCEDURE — 93306 TTE W/DOPPLER COMPLETE: CPT

## 2020-01-31 PROCEDURE — 700117 HCHG RX CONTRAST REV CODE 255: Performed by: NURSE PRACTITIONER

## 2020-01-31 PROCEDURE — 82962 GLUCOSE BLOOD TEST: CPT | Mod: 91

## 2020-01-31 PROCEDURE — 700102 HCHG RX REV CODE 250 W/ 637 OVERRIDE(OP): Performed by: HOSPITALIST

## 2020-01-31 PROCEDURE — 97161 PT EVAL LOW COMPLEX 20 MIN: CPT

## 2020-01-31 PROCEDURE — 99233 SBSQ HOSP IP/OBS HIGH 50: CPT | Performed by: HOSPITALIST

## 2020-01-31 PROCEDURE — 700117 HCHG RX CONTRAST REV CODE 255: Performed by: HOSPITALIST

## 2020-01-31 PROCEDURE — 700111 HCHG RX REV CODE 636 W/ 250 OVERRIDE (IP): Performed by: HOSPITALIST

## 2020-01-31 PROCEDURE — 770001 HCHG ROOM/CARE - MED/SURG/GYN PRIV*

## 2020-01-31 PROCEDURE — 99232 SBSQ HOSP IP/OBS MODERATE 35: CPT | Performed by: NURSE PRACTITIONER

## 2020-01-31 PROCEDURE — 70498 CT ANGIOGRAPHY NECK: CPT

## 2020-01-31 PROCEDURE — A9270 NON-COVERED ITEM OR SERVICE: HCPCS | Performed by: HOSPITALIST

## 2020-01-31 PROCEDURE — 93306 TTE W/DOPPLER COMPLETE: CPT | Mod: 26 | Performed by: INTERNAL MEDICINE

## 2020-01-31 PROCEDURE — 70496 CT ANGIOGRAPHY HEAD: CPT

## 2020-01-31 RX ORDER — ASPIRIN 325 MG
325 TABLET ORAL DAILY
Status: DISCONTINUED | OUTPATIENT
Start: 2020-01-31 | End: 2020-01-31

## 2020-01-31 RX ORDER — ASPIRIN 81 MG/1
324 TABLET, CHEWABLE ORAL DAILY
Status: DISCONTINUED | OUTPATIENT
Start: 2020-01-31 | End: 2020-01-31

## 2020-01-31 RX ORDER — ASPIRIN 81 MG/1
81 TABLET, CHEWABLE ORAL DAILY
Status: DISCONTINUED | OUTPATIENT
Start: 2020-01-31 | End: 2020-02-01 | Stop reason: HOSPADM

## 2020-01-31 RX ORDER — ENALAPRILAT 1.25 MG/ML
1.25 INJECTION INTRAVENOUS EVERY 6 HOURS PRN
Status: DISCONTINUED | OUTPATIENT
Start: 2020-01-31 | End: 2020-02-01 | Stop reason: HOSPADM

## 2020-01-31 RX ORDER — GLIPIZIDE 5 MG/1
5 TABLET ORAL
Status: DISCONTINUED | OUTPATIENT
Start: 2020-01-31 | End: 2020-02-01 | Stop reason: HOSPADM

## 2020-01-31 RX ORDER — LISINOPRIL 10 MG/1
10 TABLET ORAL
Status: DISCONTINUED | OUTPATIENT
Start: 2020-01-31 | End: 2020-02-01 | Stop reason: HOSPADM

## 2020-01-31 RX ORDER — ATORVASTATIN CALCIUM 40 MG/1
40 TABLET, FILM COATED ORAL EVERY EVENING
Status: DISCONTINUED | OUTPATIENT
Start: 2020-01-31 | End: 2020-02-01 | Stop reason: HOSPADM

## 2020-01-31 RX ORDER — LABETALOL HYDROCHLORIDE 5 MG/ML
10-20 INJECTION, SOLUTION INTRAVENOUS EVERY 6 HOURS PRN
Status: DISCONTINUED | OUTPATIENT
Start: 2020-01-31 | End: 2020-02-01 | Stop reason: HOSPADM

## 2020-01-31 RX ORDER — ASPIRIN 300 MG/1
300 SUPPOSITORY RECTAL DAILY
Status: DISCONTINUED | OUTPATIENT
Start: 2020-01-31 | End: 2020-01-31

## 2020-01-31 RX ADMIN — HUMAN ALBUMIN MICROSPHERES AND PERFLUTREN 3 ML: 10; .22 INJECTION, SOLUTION INTRAVENOUS at 12:21

## 2020-01-31 RX ADMIN — ASPIRIN 81 MG 81 MG: 81 TABLET ORAL at 08:50

## 2020-01-31 RX ADMIN — LISINOPRIL 10 MG: 10 TABLET ORAL at 17:03

## 2020-01-31 RX ADMIN — HEPARIN SODIUM 5000 UNITS: 5000 INJECTION, SOLUTION INTRAVENOUS; SUBCUTANEOUS at 14:41

## 2020-01-31 RX ADMIN — SITAGLIPTIN 50 MG: 50 TABLET, FILM COATED ORAL at 17:03

## 2020-01-31 RX ADMIN — ATORVASTATIN CALCIUM 40 MG: 40 TABLET, FILM COATED ORAL at 17:06

## 2020-01-31 RX ADMIN — LABETALOL HYDROCHLORIDE 10 MG: 5 INJECTION, SOLUTION INTRAVENOUS at 09:49

## 2020-01-31 RX ADMIN — HEPARIN SODIUM 5000 UNITS: 5000 INJECTION, SOLUTION INTRAVENOUS; SUBCUTANEOUS at 06:12

## 2020-01-31 RX ADMIN — CHLORTHALIDONE 25 MG: 25 TABLET ORAL at 06:12

## 2020-01-31 RX ADMIN — IOHEXOL 100 ML: 350 INJECTION, SOLUTION INTRAVENOUS at 16:55

## 2020-01-31 RX ADMIN — GLIPIZIDE 5 MG: 5 TABLET ORAL at 17:09

## 2020-01-31 RX ADMIN — LABETALOL HYDROCHLORIDE 10 MG: 5 INJECTION, SOLUTION INTRAVENOUS at 18:41

## 2020-01-31 RX ADMIN — HEPARIN SODIUM 5000 UNITS: 5000 INJECTION, SOLUTION INTRAVENOUS; SUBCUTANEOUS at 20:56

## 2020-01-31 RX ADMIN — ENALAPRILAT 1.25 MG: 1.25 INJECTION INTRAVENOUS at 23:07

## 2020-01-31 ASSESSMENT — GAIT ASSESSMENTS
DEVIATION: ANTALGIC
GAIT LEVEL OF ASSIST: SUPERVISED
DISTANCE (FEET): 500

## 2020-01-31 ASSESSMENT — ENCOUNTER SYMPTOMS
SPEECH CHANGE: 0
WEAKNESS: 0
SHORTNESS OF BREATH: 0
FEVER: 0
SENSORY CHANGE: 0
FOCAL WEAKNESS: 0
CHILLS: 0

## 2020-01-31 ASSESSMENT — COGNITIVE AND FUNCTIONAL STATUS - GENERAL
MOBILITY SCORE: 21
MOVING FROM LYING ON BACK TO SITTING ON SIDE OF FLAT BED: A LITTLE
CLIMB 3 TO 5 STEPS WITH RAILING: A LITTLE
MOVING TO AND FROM BED TO CHAIR: A LITTLE
SUGGESTED CMS G CODE MODIFIER MOBILITY: CJ

## 2020-01-31 NOTE — PROGRESS NOTES
Hospital Medicine Daily Progress Note    Date of Service  1/31/2020    Chief Complaint  34 y.o. male admitted 1/29/2020 with altered mental status    Hospital Course    Mr. Calixto has a past medical history of diabetes mellitus, morbid obesity, and hypertension that presented to the emergency room with altered level of consciousness as well as shaking activity concerning for seizure.  He was given IV Ativan in the emergency room and admitted to the intensive care unit.  Work-up included an MRI which is suspicious for demyelinating process versus acute stroke.      Interval Problem Update  1/31/2020: Patient seen and evaluated in the intensive care unit. He is feeling better. We discussed the need for blood pressure medications. We discussed that he will require insulin unless he is able to lose weight in which case oral meds may be sufficient. He states he does not intend to utilize insulin.    Consultants/Specialty  Critical care  Neurology, I discussed with Dr. Leti Baker    Code Status  full    Disposition  neuro    Review of Systems  Review of Systems   Constitutional: Negative for chills and fever.   Respiratory: Negative for shortness of breath.    Cardiovascular: Negative for chest pain.   Neurological: Negative for sensory change, speech change, focal weakness and weakness.   All other systems reviewed and are negative.       Physical Exam  Temp:  [35.9 °C (96.7 °F)-37.2 °C (98.9 °F)] 36.9 °C (98.5 °F)  Pulse:  [] 88  Resp:  [22-24] 22  BP: (137-186)/() 137/87  SpO2:  [87 %-99 %] 87 %    Physical Exam  Vitals signs and nursing note reviewed.   Constitutional:       Appearance: Normal appearance. He is obese.   HENT:      Head: Normocephalic and atraumatic.   Eyes:      General: No scleral icterus.     Conjunctiva/sclera: Conjunctivae normal.   Neck:      Musculoskeletal: Normal range of motion and neck supple.   Cardiovascular:      Rate and Rhythm: Normal rate and regular rhythm.      Heart  sounds: No murmur.   Pulmonary:      Effort: Pulmonary effort is normal.      Breath sounds: Normal breath sounds.   Abdominal:      General: There is no distension.      Tenderness: There is no tenderness.   Musculoskeletal:      Right lower leg: No edema.      Left lower leg: No edema.   Skin:     General: Skin is warm and dry.   Neurological:      General: No focal deficit present.      Mental Status: He is alert and oriented to person, place, and time.      Comments: He moves his extremities equally   Psychiatric:         Mood and Affect: Mood normal.         Behavior: Behavior normal.         Fluids    Intake/Output Summary (Last 24 hours) at 1/31/2020 0713  Last data filed at 1/31/2020 0500  Gross per 24 hour   Intake 1380 ml   Output 1560 ml   Net -180 ml       Laboratory  Recent Labs     01/29/20  0200 01/30/20  0340   WBC 7.1 6.9   RBC 5.84 4.74   HEMOGLOBIN 16.9 14.2   HEMATOCRIT 50.5 41.2*   MCV 86.5 86.9   MCH 28.9 30.0   MCHC 33.5* 34.5   RDW 38.0 39.1   PLATELETCT 201 161*   MPV 10.0 9.9     Recent Labs     01/29/20  0200 01/30/20  0340   SODIUM 135 137   POTASSIUM 4.0 3.4*   CHLORIDE 101 107   CO2 17* 22   GLUCOSE 421* 250*   BUN 17 15   CREATININE 1.06 0.80   CALCIUM 9.3 8.3*                   Imaging  EC-ECHOCARDIOGRAM COMPLETE W/ CONT   Final Result      CT-HEAD W/O   Final Result      Head CT without contrast within normal limits. No evidence of acute cerebral infarction, hemorrhage or mass lesion.      MR-BRAIN-W/O   Final Result      1.  7 mm lesion in the left lateral occipital lobe-parietal lobe in the deep white matter with very bright diffusion signal. The lesion is very well circumscribed. Possibly an acute infarct. An active demyelinating lesion might show a similar appearance.   2.  More superiorly in the left parietal lobe traversing the deep white matter and cortex, there is a small bandlike lesion also showing very bright diffusion signal. This may represent acute infarction. There is  no hemorrhagic transformation.   3.  No lesions are evident involving the brainstem or cerebellum.   4.  Follow-up postcontrast imaging may be helpful. (Although postcontrast imaging was recommended, this exam was obtained without gadolinium contrast).      CT-HEAD W/O   Final Result      No evidence of acute intracranial process.      CT-CTA HEAD WITH & W/O-POST PROCESS    (Results Pending)   CT-CTA NECK WITH & W/O-POST PROCESSING    (Results Pending)        Assessment/Plan  * Cerebrovascular accident (CVA) due to vascular occlusion (HCC)- (present on admission)  Assessment & Plan  Acute stroke seen on MRI  Neurology consulted  Aspirin added  Lipid panel ordered  Statin initiated  Echocardiogram ordered to eval for a source of embolism  Continuous telemetry monitoring to eval for afib  PT/OT/ST    Encephalopathy  Assessment & Plan  May be secondary to stroke  EEG negative    HTN (hypertension)- (present on admission)  Assessment & Plan  No further need for permissive hypertension   Start chlorthalidone and lisinopril     Agitation  Assessment & Plan  PRN haldol ordered    DM (diabetes mellitus) (HCC)  Assessment & Plan  With hyperglycemia  Poorly controlled with a hemoglobin A1c of 12.7  Sliding scale insulin and diabetic diet  He is not open to insulin thus initiate oral medications (hold on metformin due to contrast).    High anion gap metabolic acidosis  Assessment & Plan  IV fluids given    Morbid obesity (HCC)- (present on admission)  Assessment & Plan  BMI 53       VTE prophylaxis: heparin

## 2020-01-31 NOTE — ASSESSMENT & PLAN NOTE
Acute stroke seen on MRI  Neurology consulted  Aspirin added  Lipid panel ordered  Statin initiated  Echocardiogram ordered to eval for a source of embolism  Continuous telemetry monitoring to eval for afib  PT/OT/ST

## 2020-01-31 NOTE — CARE PLAN
Problem: Communication  Goal: The ability to communicate needs accurately and effectively will improve  Outcome: PROGRESSING SLOWER THAN EXPECTED   Effective therapeutic communication. All questions answered. Interventios explained and re explained frequently   Problem: Knowledge Deficit  Goal: Knowledge of disease process/condition, treatment plan, diagnostic tests, and medications will improve  Outcome: PROGRESSING SLOWER THAN EXPECTED   Mother and pt educated extensively. Frequent repetitive questions asked. Frequent education and reinforcement needed to be provided.

## 2020-01-31 NOTE — PROGRESS NOTES
"Chief Complaint   Patient presents with   • ALOC   • Seizure       Problem List Items Addressed This Visit     * (Principal) Cerebrovascular accident (CVA) due to vascular occlusion (HCC)     Acute stroke seen on MRI  Neurology consulted  Aspirin added  Lipid panel ordered  Statin initiated  Echocardiogram ordered to eval for a source of embolism  Continuous telemetry monitoring to eval for afib  PT/OT/ST         Relevant Medications    labetalol (NORMODYNE/TRANDATE) injection 10 mg (Completed)    heparin injection 5,000 Units    chlorthalidone (HYGROTON) tablet 25 mg    labetalol (NORMODYNE/TRANDATE) injection 10-20 mg    hydrALAZINE (APRESOLINE) injection 10-20 mg    enalaprilat (VASOTEC) injection 1.25 mg    atorvastatin (LIPITOR) tablet 40 mg (Start on 1/31/2020  6:00 PM)    Other Relevant Orders    REFERRAL TO PHYSIATRY (PMR)      Other Visit Diagnoses     Delirium        Seizure (HCC)        Relevant Medications    levETIRAcetam (KEPPRA) 1,500 mg in  mL IVPB (Completed)    Hypertensive urgency        Relevant Medications    labetalol (NORMODYNE/TRANDATE) injection 10 mg (Completed)    heparin injection 5,000 Units    chlorthalidone (HYGROTON) tablet 25 mg    labetalol (NORMODYNE/TRANDATE) injection 10-20 mg    hydrALAZINE (APRESOLINE) injection 10-20 mg    enalaprilat (VASOTEC) injection 1.25 mg    atorvastatin (LIPITOR) tablet 40 mg (Start on 1/31/2020  6:00 PM)      Neurology Progress Note    Brief History of present illness:  34 y.o. male with history of hypertension, morbid obesity presenting to the hospital for possible seizure and consulted for seizure and agitation; patient was reportedly found altered at home with \"generalized shaking\" and he was given Versed by EMS.  He was also noted to become combative and came to the emergency department for further evaluation.  In the emergency department he was given Ativan with some improvement.  CT head without contrast was negative for any acute " pathology and EEG was normal.  Due to agitation, the patient was transferred to the ICU for further management. Further work up is in progress.     Interval, 1/31/20:   Patient is sitting up in chair at bedside. Awake and alert. States that he feels fine, no complaints. Does not recall yesterday's events well. No events overnight.     No changes to HPI as was previously documented.     Past medical history:   Past Medical History:   Diagnosis Date   • Hypertension     first  discovered 2012   • Obesities, morbid (HCC)     since childhood       Past surgical history:   History reviewed. No pertinent surgical history.    Family history:   Family History   Problem Relation Age of Onset   • Diabetes Father        Social history:   Social History     Socioeconomic History   • Marital status: Single     Spouse name: Not on file   • Number of children: Not on file   • Years of education: Not on file   • Highest education level: Not on file   Occupational History   • Not on file   Social Needs   • Financial resource strain: Not on file   • Food insecurity:     Worry: Not on file     Inability: Not on file   • Transportation needs:     Medical: Not on file     Non-medical: Not on file   Tobacco Use   • Smoking status: Current Every Day Smoker     Packs/day: 0.50     Years: 8.00     Pack years: 4.00     Types: Cigarettes   • Smokeless tobacco: Never Used   • Tobacco comment: quit mandy 12/6/10   Substance and Sexual Activity   • Alcohol use: No   • Drug use: Yes     Comment: smokes marajuana    • Sexual activity: Not Currently     Partners: Female   Lifestyle   • Physical activity:     Days per week: Not on file     Minutes per session: Not on file   • Stress: Not on file   Relationships   • Social connections:     Talks on phone: Not on file     Gets together: Not on file     Attends Voodoo service: Not on file     Active member of club or organization: Not on file     Attends meetings of clubs or organizations: Not on  file     Relationship status: Not on file   • Intimate partner violence:     Fear of current or ex partner: Not on file     Emotionally abused: Not on file     Physically abused: Not on file     Forced sexual activity: Not on file   Other Topics Concern   • Not on file   Social History Narrative   • Not on file       Current medications:   Current Facility-Administered Medications   Medication Dose   • atorvastatin (LIPITOR) tablet 40 mg  40 mg   • aspirin (ASA) chewable tab 81 mg  81 mg   • insulin regular (HUMULIN R) injection 1-6 Units  1-6 Units    And   • glucose 4 g chewable tablet 16 g  16 g    And   • DEXTROSE 10% BOLUS 250 mL  250 mL   • senna-docusate (PERICOLACE or SENOKOT S) 8.6-50 MG per tablet 2 Tab  2 Tab    And   • polyethylene glycol/lytes (MIRALAX) PACKET 1 Packet  1 Packet    And   • magnesium hydroxide (MILK OF MAGNESIA) suspension 30 mL  30 mL    And   • bisacodyl (DULCOLAX) suppository 10 mg  10 mg   • heparin injection 5,000 Units  5,000 Units   • ondansetron (ZOFRAN) syringe/vial injection 4 mg  4 mg   • ondansetron (ZOFRAN ODT) dispertab 4 mg  4 mg   • promethazine (PHENERGAN) tablet 12.5-25 mg  12.5-25 mg   • promethazine (PHENERGAN) suppository 12.5-25 mg  12.5-25 mg   • prochlorperazine (COMPAZINE) injection 5-10 mg  5-10 mg   • chlorthalidone (HYGROTON) tablet 25 mg  25 mg   • haloperidol lactate (HALDOL) injection 2-5 mg  2-5 mg   • labetalol (NORMODYNE/TRANDATE) injection 10-20 mg  10-20 mg   • hydrALAZINE (APRESOLINE) injection 10-20 mg  10-20 mg   • enalaprilat (VASOTEC) injection 1.25 mg  1.25 mg   • LORazepam (ATIVAN) injection 2 mg  2 mg       Medication Allergy:  No Known Allergies    Review of systems:   Constitutional: denies fever, night sweats, weight loss.   Eyes: denies acute vision change, eye pain or secretion.   Ears, Nose, Mouth, Throat: denies nasal secretion, nasal bleeding, difficulty swallowing, hearing loss, tinnitus, vertigo, ear pain, acute dental problems, oral  ulcers or lesions.   Endocrine: denies recent weight changes, heat or cold intolerance, polyuria, polydypsia, polyphagia,abnormal hair growth.  Cardiovascular: denies new onset of chest pain, palpitations, syncope, or dyspnea of exertion.  Pulmonary: denies shortness of breath, new onset of cough, hemoptysis, wheezing, chest pain or flu-like symptoms.   GI: denies nausea, vomiting, diarrhea, GI bleeding, change in appetite, abdominal pain, and change in bowel habits.  : denies dysuria, urinary incontinence, hematuria.  Heme/oncology: denies history of easy bruising or bleeding. No history of cancer, DVTor PE.  Allergy/immunology: denies hives/urticaria, or itching.   Dermatologic: denies new rash, or new skin lesions.  Musculoskeletal:denies joint swelling or pain, muscle pain, neck and back pain. Neurologic: As noted above.   Psychiatric: denies symptoms of depression, anxiety, hallucinations, mood swings or changes, suicidal or homicidal thoughts. Admits to previous history of illicit drug abuse, none currently.      Physical examination:   Vitals:    01/31/20 1100 01/31/20 1200 01/31/20 1300 01/31/20 1400   BP:  148/87 126/86 132/89   Pulse: 85 76 86 86   Resp: (P) 18 18 20 18   Temp:       TempSrc:       SpO2: 96% 97% 92% 97%   Weight:       Height:         General: Patient in no acute distress, pleasant and cooperative.  HEENT: Normocephalic, no signs of acute trauma.   Neck: supple, no meningeal signs or carotid bruits. There is normal range of motion. No tenderness on exam.   Chest: clear to auscultation. No cough.   CV: RRR, no murmurs.   Skin: no signs of acute rashes or trauma.   Musculoskeletal: joints exhibit full range of motion, without any pain to palpation. There are no signs of joint or muscle swelling. There is no tenderness to deep palpation of muscles.   Psychiatric: No hallucinatory behavior.       NEUROLOGICAL EXAM:   Mental status, orientation: Awake, alert and fully oriented.   Speech and  language: speech is clear and fluent. The patient is able to name, repeat and comprehend.   Memory: There is intact recollection of recent and remote events.   Cranial nerve exam: Pupils are 3-4 mm bilaterally and equally reactive to light and accommodation. Visual fields are intact by confrontation. There is no nystagmus on primary or secondary gaze. Intact full EOM in all directions of gaze. Face appears symmetric. Sensation in the face is intact to light touch. Tongue is midline and without any signs of tongue biting or fasciculations.Shoulder shrug is intact bilaterally.   Motor exam: Strength is 5/5 in all extremities. Tone is normal. No abnormal movements were seen on exam.   Sensory exam reveals normal sense of light touch and pinprick in all extremities.   Deep tendon reflexes:  2+ throughout. Plantar responses are flexor. There is no clonus.   Coordination: shows a normal finger-nose-finger. Normal rapidly alternating movements.   Gait: Not assessed at this time as patient is a fall risk.       NIH Stroke Scale    1a Level of Consciousness   1b Orientation Questions   1c Response to Commands   2 Gaze   3 Visual Fields   4 Facial Movement   5 Motor Function (arm)   a Left   b Right   6 Motor Function (leg)   a Left   b Right   7 Limb Ataxia   8 Sensory   9 Language   10 Articulation   11 Extinction/Inattention     Score: 0      ANCILLARY DATA REVIEWED:     Lab Data Review:  Recent Results (from the past 24 hour(s))   ACCU-CHEK GLUCOSE    Collection Time: 01/30/20  5:24 PM   Result Value Ref Range    Glucose - Accu-Ck 307 (H) 65 - 99 mg/dL   ACCU-CHEK GLUCOSE    Collection Time: 01/30/20  9:07 PM   Result Value Ref Range    Glucose - Accu-Ck 316 (H) 65 - 99 mg/dL   ACCU-CHEK GLUCOSE    Collection Time: 01/31/20  6:11 AM   Result Value Ref Range    Glucose - Accu-Ck 169 (H) 65 - 99 mg/dL   ACCU-CHEK GLUCOSE    Collection Time: 01/31/20 11:33 AM   Result Value Ref Range    Glucose - Accu-Ck 314 (H) 65 - 99  "mg/dL   EC-ECHOCARDIOGRAM COMPLETE W/ CONT    Collection Time: 01/31/20 12:19 PM   Result Value Ref Range    Eject.Marshac. MOD 4C 47.16     Left Ventrical Ejection Fraction 60        Labs reviewed by me.       Imaging reviewed by me:     EC-ECHOCARDIOGRAM COMPLETE W/ CONT   Final Result      CT-HEAD W/O   Final Result      Head CT without contrast within normal limits. No evidence of acute cerebral infarction, hemorrhage or mass lesion.      MR-BRAIN-W/O   Final Result      1.  7 mm lesion in the left lateral occipital lobe-parietal lobe in the deep white matter with very bright diffusion signal. The lesion is very well circumscribed. Possibly an acute infarct. An active demyelinating lesion might show a similar appearance.   2.  More superiorly in the left parietal lobe traversing the deep white matter and cortex, there is a small bandlike lesion also showing very bright diffusion signal. This may represent acute infarction. There is no hemorrhagic transformation.   3.  No lesions are evident involving the brainstem or cerebellum.   4.  Follow-up postcontrast imaging may be helpful. (Although postcontrast imaging was recommended, this exam was obtained without gadolinium contrast).      CT-HEAD W/O   Final Result      No evidence of acute intracranial process.      CT-CTA HEAD WITH & W/O-POST PROCESS    (Results Pending)   CT-CTA NECK WITH & W/O-POST PROCESSING    (Results Pending)       Presumed mechanism by TOAST:  _X_Large Artery Atherosclerosis  __Small Vessel (Lacunar)  __Cardioembolic  __Other (Sickle Cell, Vasculitis, Hypercoagulable)  __Unknown    Vs small vessel vs other.       ASSESSMENT AND PLAN:  34 y.o. male with history of hypertension, morbid obesity presenting to the hospital for possible seizure; patient was reportedly found altered at home with \"generalized shaking\" and he was given Versed by EMS; further details including duration and other characteristics of event are unknown. In ED, was " "combative, agitated. Today, calm and cooperative, no focal neurological deficits; interestingly, patient's MRI Brain revealed Left parietal and occipital lobe restricted diffusion most consistent with acute ischemic stroke; etiology small vessel vs embolic/atheroembolic; CTA head/neck pending. Etiology of presumed \"seizure\" remains unclear; note UDS unremarkable (positve for benzos which patient received en route); have relatively high suspicion that event was in fact not a seizure as ictus of ischemic stroke is rarely associated with/typifid by epileptic seizure. Further work up pending.      Recommendations/Plan:     -q4h and PRN neuro assessment. VS per nursing/unit protocol. Permissive HTN ok until end of day on 1/31; not to exceed SBP > 220, DBP > 105. Then, BP goal < 140/90. Antihypertensives per primary team.   -Obtain CTA head/neck to further evaluate vasculature; ordered and pending.   -Telemetry; currently SR. Screen for Afib/arrhythmia. Note TTE with EF 60%, no gross structural abnormalities nor PFO.   -ASA 81 mg PO q day and Atorvastatin 80 mg PO q HS. Check lipid panel-- pending.  -Recommend aggressive BG management per primary team. Avoid IVF with Dextrose. BG goal 140-180. Hemoglobin A1c-- 12.7. Will recommend diabetes educator consultation for further resources and teach.   -Note EEG normal; ictus of ischemic stroke not usually characterized by seizure; will recommend seizure precautions; Ativan 1 mg IV PRN for breakthrough GTCS.  -PT/OT/SLP eval and treat.   -Counseled patient at length regarding life style and risk factor modification for secondary stroke prevention.   -All other medical management per primary team.   -DVT PPX: SCDs.      The plan of care above has been discussed with Dr. Diggs.     Blessing Tyler, [email or web address].  [facility]      "

## 2020-01-31 NOTE — PROGRESS NOTES
Bedside neur exam performed. Pt Alert but only oriented to self.   MD Mackey updated and stat head CT ordered.  ~20 minutes later pt became more lucid, asking nurse appropriate conversational questions and now A&Ox4

## 2020-01-31 NOTE — DISCHARGE PLANNING
[facility] Transitional Care Coordination     Referral from:  Dr. Mackey    Facesheet indicates: No insurance provider identified    Potential Rehab Diagnosis: Encephalopathy    Chart review indicates it is too early to determine ongoing medical management and therapy needs to possibly meet inpatient rehab facility criteria, with the goal of returning to community.    D/C support: Needs clarification    Presented on 1/29/2020 with encephalopathy and possible seizure. Medical workup ongoing, therapy evals pending as clinically appropriate.      Physiatry consultation pended per protocol while waiting for additional information to determine appropriateness for inpatient rehab. TCC will follow.     Please note - at present, there is no insurance identified. Please have PFA screen for potential insurance provider.     Thank you for the referral.

## 2020-01-31 NOTE — CARE PLAN
Problem: Infection  Goal: Will remain free from infection  Outcome: MET     Problem: Venous Thromboembolism (VTW)/Deep Vein Thrombosis (DVT) Prevention:  Goal: Patient will participate in Venous Thrombosis (VTE)/Deep Vein Thrombosis (DVT)Prevention Measures  Outcome: MET      100% of the time

## 2020-02-01 VITALS
SYSTOLIC BLOOD PRESSURE: 135 MMHG | OXYGEN SATURATION: 96 % | HEART RATE: 72 BPM | BODY MASS INDEX: 45.1 KG/M2 | RESPIRATION RATE: 25 BRPM | TEMPERATURE: 98 F | WEIGHT: 315 LBS | DIASTOLIC BLOOD PRESSURE: 67 MMHG | HEIGHT: 70 IN

## 2020-02-01 DIAGNOSIS — I63.9 CEREBROVASCULAR ACCIDENT (CVA), UNSPECIFIED MECHANISM (HCC): Primary | ICD-10-CM

## 2020-02-01 PROBLEM — E87.29 HIGH ANION GAP METABOLIC ACIDOSIS: Status: RESOLVED | Noted: 2020-01-29 | Resolved: 2020-02-01

## 2020-02-01 PROBLEM — G93.40 ENCEPHALOPATHY: Status: RESOLVED | Noted: 2020-01-29 | Resolved: 2020-02-01

## 2020-02-01 PROBLEM — R45.1 AGITATION: Status: RESOLVED | Noted: 2020-01-29 | Resolved: 2020-02-01

## 2020-02-01 LAB
CHOLEST SERPL-MCNC: 112 MG/DL (ref 100–199)
GLUCOSE BLD-MCNC: 133 MG/DL (ref 65–99)
GLUCOSE BLD-MCNC: 150 MG/DL (ref 65–99)
HDLC SERPL-MCNC: 25 MG/DL
LDLC SERPL CALC-MCNC: 65 MG/DL
TRIGL SERPL-MCNC: 109 MG/DL (ref 0–149)

## 2020-02-01 PROCEDURE — 82962 GLUCOSE BLOOD TEST: CPT | Mod: 91

## 2020-02-01 PROCEDURE — 99232 SBSQ HOSP IP/OBS MODERATE 35: CPT | Performed by: PSYCHIATRY & NEUROLOGY

## 2020-02-01 PROCEDURE — 700101 HCHG RX REV CODE 250: Performed by: HOSPITALIST

## 2020-02-01 PROCEDURE — 80061 LIPID PANEL: CPT

## 2020-02-01 PROCEDURE — 700111 HCHG RX REV CODE 636 W/ 250 OVERRIDE (IP): Performed by: HOSPITALIST

## 2020-02-01 PROCEDURE — 700102 HCHG RX REV CODE 250 W/ 637 OVERRIDE(OP): Performed by: HOSPITALIST

## 2020-02-01 PROCEDURE — 99239 HOSP IP/OBS DSCHRG MGMT >30: CPT | Performed by: HOSPITALIST

## 2020-02-01 PROCEDURE — A9270 NON-COVERED ITEM OR SERVICE: HCPCS | Performed by: HOSPITALIST

## 2020-02-01 RX ORDER — ATORVASTATIN CALCIUM 20 MG/1
20 TABLET, FILM COATED ORAL DAILY
Qty: 30 TAB | Refills: 3 | Status: SHIPPED | OUTPATIENT
Start: 2020-02-01 | End: 2020-02-18 | Stop reason: SDUPTHER

## 2020-02-01 RX ORDER — GLYBURIDE-METFORMIN HYDROCHLORIDE 5; 500 MG/1; MG/1
1 TABLET ORAL
Qty: 30 TAB | Refills: 3 | Status: SHIPPED | OUTPATIENT
Start: 2020-02-01 | End: 2020-02-18 | Stop reason: SDUPTHER

## 2020-02-01 RX ORDER — ASPIRIN 81 MG/1
81 TABLET, CHEWABLE ORAL DAILY
Qty: 100 TAB | COMMUNITY
Start: 2020-02-02 | End: 2020-07-31 | Stop reason: SDUPTHER

## 2020-02-01 RX ORDER — LISINOPRIL AND HYDROCHLOROTHIAZIDE 25; 20 MG/1; MG/1
1 TABLET ORAL DAILY
Qty: 30 TAB | Refills: 3 | Status: SHIPPED | OUTPATIENT
Start: 2020-02-01 | End: 2020-02-18 | Stop reason: SDUPTHER

## 2020-02-01 RX ADMIN — GLIPIZIDE 5 MG: 5 TABLET ORAL at 06:04

## 2020-02-01 RX ADMIN — LISINOPRIL 10 MG: 10 TABLET ORAL at 06:05

## 2020-02-01 RX ADMIN — SITAGLIPTIN 50 MG: 50 TABLET, FILM COATED ORAL at 06:05

## 2020-02-01 RX ADMIN — HEPARIN SODIUM 5000 UNITS: 5000 INJECTION, SOLUTION INTRAVENOUS; SUBCUTANEOUS at 06:05

## 2020-02-01 RX ADMIN — CHLORTHALIDONE 25 MG: 25 TABLET ORAL at 06:05

## 2020-02-01 RX ADMIN — ASPIRIN 81 MG 81 MG: 81 TABLET ORAL at 06:04

## 2020-02-01 RX ADMIN — LABETALOL HYDROCHLORIDE 20 MG: 5 INJECTION, SOLUTION INTRAVENOUS at 01:01

## 2020-02-01 NOTE — CARE PLAN
Problem: Safety - Medical Restraint  Goal: Remains free of injury from restraints (Restraint for Interference with Medical Device)  Description  INTERVENTIONS:  1. Determine that other, less restrictive measures have been tried or would not be effective before applying the restraint  2. Evaluate the patient's condition at the time of restraint application  3. Inform patient/family regarding the reason for restraint  4. Q2H: Monitor safety, psychosocial status, comfort, nutrition and hydration  Outcome: MET  Goal: Free from restraint(s) (Restraint for Interference with Medical Device)  Description  INTERVENTIONS:  1. ONCE/SHIFT or MINIMUM Q12H: Assess and document the continuing need for restraints  2. Q24H: Continued use of restraint requires LIP to perform face to face examination and written order  3. Identify and implement measures to help patient regain control  Outcome: MET     Problem: Communication  Goal: The ability to communicate needs accurately and effectively will improve  Outcome: MET     Problem: Safety  Goal: Will remain free from injury  Outcome: MET  Goal: Will remain free from falls  Outcome: MET     Problem: Bowel/Gastric:  Goal: Normal bowel function is maintained or improved  Outcome: MET

## 2020-02-01 NOTE — DISCHARGE SUMMARY
Discharge Summary    CHIEF COMPLAINT ON ADMISSION  Chief Complaint   Patient presents with   • ALOC   • Seizure       Reason for Admission  EMS     Admission Date  1/29/2020    CODE STATUS  Full Code    HPI & HOSPITAL COURSE  This is a 34 y.o. male here with altered mental status.   Mr. Calixto has a past medical history of diabetes mellitus, morbid obesity, and hypertension that presented to the emergency room with altered level of consciousness as well as shaking activity concerning for seizure.  He was given IV Ativan in the emergency room and admitted to the intensive care unit.  Work-up included an MRI which is suspicious for demyelinating process versus acute stroke.   He was valuated by neurology felt his condition is most consistent with an acute stroke.  He had extensive stroke work-up as noted above.  He was started on blood pressure medications as well as insulin sliding scale.  Is been monitored on telemetry has not had any arrhythmias specifically no atrial fibrillation.  Today he is ambulating unassisted, on room air, tolerating a regular diet and has no neurologic deficits will be discharged home in stable condition.  He does not require therapies.  Mister Calixto does not have insurance will need to be on multiple medications.  I have looked up the medications on the [employer] $4 list to make them as cheap as possible.  Will be on a statin, blood pressure medications, diabetes medications and over-the-counter aspirin.  We had a long discussion today about diet exercise and lifestyle modifications.  Is currently 370 pounds and is looking forward to starting an exercise routine and diet today.  He has a hemoglobin A1c of 12.7 will be a good candidate to start on insulin.  Patient states he is not interested in starting on insulin and not prepared to initiate this step yet.  Therefore we will treat him with oral antihyperglycemics and hopefully he can lose enough weight that his sugars will become more  controlled.  Request that he follow-up at the [facility] and establish care.     Therefore, he is discharged in good and stable condition to home with close outpatient follow-up.    The patient met 2-midnight criteria for an inpatient stay at the time of discharge.    Discharge Date  2/1    FOLLOW UP ITEMS POST DISCHARGE    He would benefit from an outpatient MRI to follow-up if this is possibly a demyelinating process will need a neurology referral in the next few months from either the hospitals or [facility] clinic which ever 1 he can get into.  DISCHARGE DIAGNOSES  Principal Problem:    Cerebrovascular accident (CVA) due to vascular occlusion (HCC) POA: Yes  Active Problems:    HTN (hypertension) POA: Yes    DM (diabetes mellitus) (HCC) POA: Unknown    JAYLIN (obstructive sleep apnea) POA: Unknown    Morbid obesity (HCC) POA: Yes  Resolved Problems:    Encephalopathy POA: Unknown    High anion gap metabolic acidosis POA: Unknown    Agitation POA: Unknown      FOLLOW UP  No future appointments.  [address] [address]  [phone]  Schedule an appointment as soon as possible for a visit        MEDICATIONS ON DISCHARGE     Medication List      START taking these medications      Instructions   aspirin 81 MG Chew chewable tablet  Start taking on:  February 2, 2020  Commonly known as:  ASA   Take 1 Tab by mouth every day.  Dose:  81 mg     atorvastatin 20 MG Tabs  Commonly known as:  LIPITOR   Take 1 Tab by mouth every day.  Dose:  20 mg     glyBURIDE-metFORMIN 5-500 MG Tabs  Commonly known as:  GLUCOVANCE   Take 1 Tab by mouth every morning with breakfast.  Dose:  1 Tab     lisinopril-hydrochlorothiazide 20-25 MG per tablet  Commonly known as:  PRINZIDE   Take 1 Tab by mouth every day.  Dose:  1 Tab            Allergies  No Known Allergies    DIET  Orders Placed This Encounter   Procedures   • Diet Order Diabetic, Regular     Standing Status:   Standing     Number of Occurrences:   1      Order Specific Question:   Diet:     Answer:   Diabetic [3]     Order Specific Question:   Diet:     Answer:   Regular [1]       ACTIVITY  Plenty of exercise    CONSULTATIONS  Critical care  Neurology. I discussed with Dr. Diggs today    PROCEDURES  EEG:  INTERPRETATION:  This is a normal routine EEG recording in the awake, drowsy, and sleep state(s).  Clinical correlation is recommended    LABORATORY  Lab Results   Component Value Date    SODIUM 137 01/30/2020    POTASSIUM 3.4 (L) 01/30/2020    CHLORIDE 107 01/30/2020    CO2 22 01/30/2020    GLUCOSE 250 (H) 01/30/2020    BUN 15 01/30/2020    CREATININE 0.80 01/30/2020    TSH 2.2, Free T4 0.96  HIV negative  HDL 25  LDL 65  HbA1c 12.7  Lab Results   Component Value Date    WBC 6.9 01/30/2020    HEMOGLOBIN 14.2 01/30/2020    HEMATOCRIT 41.2 (L) 01/30/2020    PLATELETCT 161 (L) 01/30/2020      Imaging studies:  CT-CTA HEAD WITH & W/O-POST PROCESS   Final Result      CT angiogram of the [place] within normal limits.      CT-CTA NECK WITH & W/O-POST PROCESSING   Final Result      CT angiogram of the neck within normal limits.      EC-ECHOCARDIOGRAM COMPLETE W/ CONT   Final Result      CT-HEAD W/O   Final Result      Head CT without contrast within normal limits. No evidence of acute cerebral infarction, hemorrhage or mass lesion.      MR-BRAIN-W/O   Final Result      1.  7 mm lesion in the left lateral occipital lobe-parietal lobe in the deep white matter with very bright diffusion signal. The lesion is very well circumscribed. Possibly an acute infarct. An active demyelinating lesion might show a similar appearance.   2.  More superiorly in the left parietal lobe traversing the deep white matter and cortex, there is a small bandlike lesion also showing very bright diffusion signal. This may represent acute infarction. There is no hemorrhagic transformation.   3.  No lesions are evident involving the brainstem or cerebellum.   4.  Follow-up postcontrast  imaging may be helpful. (Although postcontrast imaging was recommended, this exam was obtained without gadolinium contrast).      CT-HEAD W/O   Final Result      No evidence of acute intracranial process.        Echocardiogram:  CONCLUSIONS  Technically difficult study incomplete information is obtained.   Normal left ventricular systolic function.  Left ventricular ejection fraction is visually estimated to be 60%.  Right ventricle not well visualized.  Valves not well visualized but no significant stenosis or regurgitation   visualized.   Unable to estimate intracardiac pressures.      No prior study is available for comparison    Total time of the discharge process exceeds 32 minutes.

## 2020-02-01 NOTE — PROGRESS NOTES
Hospital Neurology Progress Note:     Interval History: No acute events overnight.  No complaints today.    Objective:   Vitals:    02/01/20 0500 02/01/20 0600 02/01/20 0700 02/01/20 0800   BP: 123/71 117/67 122/72 135/67   Pulse: 71 72 77 72   Resp: (!) 26 (!) 35 (!) 32 (!) 25   Temp:    36.7 °C (98 °F)   TempSrc:    Temporal   SpO2: 96% 98% 95% 96%   Weight:       Height:           Labs:        Lab Results   Component Value Date/Time    WBC 6.9 01/30/2020 03:40 AM    RBC 4.74 01/30/2020 03:40 AM    HEMOGLOBIN 14.2 01/30/2020 03:40 AM    HEMATOCRIT 41.2 (L) 01/30/2020 03:40 AM    MCV 86.9 01/30/2020 03:40 AM    MCH 30.0 01/30/2020 03:40 AM    MCHC 34.5 01/30/2020 03:40 AM    MPV 9.9 01/30/2020 03:40 AM    NEUTSPOLYS 70.20 01/30/2020 03:40 AM    LYMPHOCYTES 21.40 (L) 01/30/2020 03:40 AM    MONOCYTES 6.70 01/30/2020 03:40 AM    EOSINOPHILS 0.90 01/30/2020 03:40 AM    BASOPHILS 0.40 01/30/2020 03:40 AM      Lab Results   Component Value Date/Time    SODIUM 137 01/30/2020 03:40 AM    POTASSIUM 3.4 (L) 01/30/2020 03:40 AM    CHLORIDE 107 01/30/2020 03:40 AM    CO2 22 01/30/2020 03:40 AM    GLUCOSE 250 (H) 01/30/2020 03:40 AM    BUN 15 01/30/2020 03:40 AM    CREATININE 0.80 01/30/2020 03:40 AM      Lab Results   Component Value Date/Time    CHOLSTRLTOT 112 02/01/2020 04:20 AM    LDL 65 02/01/2020 04:20 AM    HDL 25 (A) 02/01/2020 04:20 AM    TRIGLYCERIDE 109 02/01/2020 04:20 AM       Lab Results   Component Value Date/Time    ALKPHOSPHAT 67 01/30/2020 03:40 AM    ASTSGOT 20 01/30/2020 03:40 AM    ALTSGPT 24 01/30/2020 03:40 AM    TBILIRUBIN 2.4 (H) 01/30/2020 03:40 AM        Imaging/Testing:   No new neuroimaging to review.    Physical Exam:      General: Well-appearing 34-year-old male in no acute distress.  Cardio: Normal S1/S2. No peripheral edema.   Pulm: CTAX2. No respiratory distress.   Skin: Warm, dry, no rashes or lesions   Psychiatric: Appropriate affect. No active psychosis.  HEENT: Atraumatic head, normal  "sclera and conjunctiva, moist oral mucosa. No lid lag.  Abdomen: Soft, non tender. No masses or hepatosplenomegaly.     Neurologic:  Mental Status:  AAOx4. Able to follow commands/cross midline. Speech fluent/nondysarthric. Language functions intact. No neglect/apraxia.  Cranial Nerves:  PERRL. EOMi. Face symmetric, palate/tongue midline. Visual fields full to confrontation. Facial sensation intact.   Motor:  Normal muscle tone and bulk. Strength is 5/5 throughout. No abnormal movements.  Reflexes: Deferred  Coordination: Finger-to-nose without ataxia  Sensation: Normal to light touch throughout  Gait/Station: Deferred    Patient examined on 2/1/2020 and compared to physical exam on 1/29/2020 no significant clinical change was seen.    Assessment/Plan:    Christ Calixto is a 34 y.o. male with history of hypertension, morbid obesity presenting to the hospital for possible seizure and consulted for seizure and agitation.  At this time, I do not have any good description of what the event in question was like.  Namely, there is no available description of the \"seizure-like activity\" nor of his state while he was agitated.  In this regard, given normal brain imaging and normal EEG I do not have any conclusive evidence to believe that the patient had a seizure or has epilepsy.    MRI of the brain without contrast showed 2 strokes in the left parieto-occipital distribution which corresponds to the MCA territory.  Etiology of stroke is unknown however the patient has several vascular risk factors including morbid obesity, as well as uncontrolled diabetes.  This is the most likely etiology however since he is 34 years old a stroke in the young work-up should be performed.  Vascular imaging studies were not consistent with a vasculitis or vasculopathy or dissection.  In this regard, we will evaluate for a hypercoagulable state as at this point I do not believe this to be related to genetics.    Plan:  1.  LDL 65.  At goal.  " Atorvastatin 40 mg daily.  2.  Hemoglobin A1c 13.  Will need to be below 7.  3.  Outpatient systolic blood pressure goal 130  4.  Initiate aspirin 81 mg daily  5. Obtain Hypercoagulable panel   -Anticardiolipin antibody (IgG, IgM, IgA)   -Antithrombin 3 activity   -Lupus anticoagulant   -Factor V Leiden Gene Mutation.   -Factor 8 activity   -Homocysteine   -Protein C activity   -Protein S activity   -Prothrombin gene mutation ([id number])   -Gregory Viper Venom Time  6.  Will need follow-up [facility].  I will arrange this.  7.  Neurology signing off.  Please call with any further questions or concerns.  8.  Plan discussed with consulting physician and patient's nurse.     Olaf Baker M.D., Diplomat of the American Board of Psychiatry and Neurology  Diplomat of ABP[employer] Epilepsy Subspecialty   Assistant Clinical Professor, [facility] Neurology Consultant

## 2020-02-01 NOTE — DISCHARGE PLANNING
Follow up for rehab chart review NIH stroke score 0. Limited therapy need for IRF level of care. Mobility and self care score 21/18. Physical therapy note reviewed. No physiatry consult ordered per protocol. In the event of change in status prior to being medically cleared, please re consult PMR.

## 2020-02-01 NOTE — CARE PLAN
Problem: Safety  Goal: Will remain free from injury  Outcome: PROGRESSING AS EXPECTED   Uses call light appropriately  Problem: Fluid Volume:  Goal: Will maintain balanced intake and output  Outcome: PROGRESSING AS EXPECTED   Adequate Is and Os

## 2020-02-01 NOTE — DISCHARGE INSTRUCTIONS
Discharge Instructions    Discharged to home by car with relative. Discharged via wheelchair, hospital escort: Yes.  Special equipment needed: Not Applicable    Be sure to schedule a follow-up appointment with your primary care doctor or any specialists as instructed.     Discharge Plan:   Smoking Cessation Offered: Patient Counseled  Influenza Vaccine Indication: Indicated: 9 to 64 years of age  Influenza Vaccine Given - only chart on this line when given: Influenza Vaccine Given (See MAR)    I understand that a diet low in cholesterol, fat, and sodium is recommended for good health. Unless I have been given specific instructions below for another diet, I accept this instruction as my diet prescription.   Other diet: Regular consistency, low carb, high protein, low sugar (diabetic friendly)    Special Instructions: None    · Is patient discharged on Warfarin / Coumadin?   No     Depression / Suicide Risk    As you are discharged from this [facility] facility, it is important to learn how to keep safe from harming yourself.    Recognize the warning signs:  · Abrupt changes in personality, positive or negative- including increase in energy   · Giving away possessions  · Change in eating patterns- significant weight changes-  positive or negative  · Change in sleeping patterns- unable to sleep or sleeping all the time   · Unwillingness or inability to communicate  · Depression  · Unusual sadness, discouragement and loneliness  · Talk of wanting to die  · Neglect of personal appearance   · Rebelliousness- reckless behavior  · Withdrawal from people/activities they love  · Confusion- inability to concentrate     If you or a loved one observes any of these behaviors or has concerns about self-harm, here's what you can do:  · Talk about it- your feelings and reasons for harming yourself  · Remove any means that you might use to hurt yourself (examples: pills, rope, extension cords, firearm)  · Get professional help  from the community (Mental Health, Substance Abuse, psychological counseling)  · Do not be alone:Call your Safe Contact- someone whom you trust who will be there for you.  · Call your local CRISIS HOTLINE 563-4944 or 990-262-4836  · Call your local Children's Mobile Crisis Response Team [place] (829) 200-9336 or www.Prediculous  · Call the toll free National Suicide Prevention Hotlines   · National Suicide Prevention Lifeline 975-950-[facility] (2826)  · National Hope Line Network 800-SUICIDE (736-4698)      Discharge Instructions per Lenny Mackey M.D.    Diet and exercise with weight loss as discussed. Please establish with the [facility] for follow up.    DIET: diabetic    ACTIVITY: as much exercise as you can    DIAGNOSIS: stroke    Return to ER if symptoms worsen

## 2020-02-18 ENCOUNTER — OFFICE VISIT (OUTPATIENT)
Dept: MEDICAL GROUP | Facility: MEDICAL CENTER | Age: 35
End: 2020-02-18
Attending: NURSE PRACTITIONER
Payer: MEDICAID

## 2020-02-18 VITALS
DIASTOLIC BLOOD PRESSURE: 92 MMHG | OXYGEN SATURATION: 95 % | SYSTOLIC BLOOD PRESSURE: 138 MMHG | TEMPERATURE: 97.4 F | WEIGHT: 315 LBS | RESPIRATION RATE: 16 BRPM | BODY MASS INDEX: 45.1 KG/M2 | HEART RATE: 85 BPM | HEIGHT: 70 IN

## 2020-02-18 DIAGNOSIS — Z76.89 ENCOUNTER TO ESTABLISH CARE: ICD-10-CM

## 2020-02-18 DIAGNOSIS — Z13.21 ENCOUNTER FOR VITAMIN DEFICIENCY SCREENING: ICD-10-CM

## 2020-02-18 DIAGNOSIS — Z13.228 SCREENING FOR METABOLIC DISORDER: ICD-10-CM

## 2020-02-18 DIAGNOSIS — I63.59 CEREBROVASCULAR ACCIDENT (CVA) DUE TO OCCLUSION OF OTHER CEREBRAL ARTERY (HCC): ICD-10-CM

## 2020-02-18 DIAGNOSIS — I10 ESSENTIAL HYPERTENSION: ICD-10-CM

## 2020-02-18 DIAGNOSIS — E11.65 TYPE 2 DIABETES MELLITUS WITH HYPERGLYCEMIA, WITHOUT LONG-TERM CURRENT USE OF INSULIN (HCC): ICD-10-CM

## 2020-02-18 DIAGNOSIS — F41.9 ANXIETY: ICD-10-CM

## 2020-02-18 PROCEDURE — 99213 OFFICE O/P EST LOW 20 MIN: CPT | Performed by: NURSE PRACTITIONER

## 2020-02-18 PROCEDURE — 99203 OFFICE O/P NEW LOW 30 MIN: CPT | Performed by: NURSE PRACTITIONER

## 2020-02-18 PROCEDURE — 99204 OFFICE O/P NEW MOD 45 MIN: CPT | Performed by: NURSE PRACTITIONER

## 2020-02-18 RX ORDER — LISINOPRIL AND HYDROCHLOROTHIAZIDE 25; 20 MG/1; MG/1
1 TABLET ORAL DAILY
Qty: 30 TAB | Refills: 3 | Status: SHIPPED | OUTPATIENT
Start: 2020-02-18 | End: 2020-07-31 | Stop reason: SDUPTHER

## 2020-02-18 RX ORDER — ATORVASTATIN CALCIUM 20 MG/1
20 TABLET, FILM COATED ORAL DAILY
Qty: 30 TAB | Refills: 3 | Status: SHIPPED | OUTPATIENT
Start: 2020-02-18 | End: 2020-07-31 | Stop reason: SDUPTHER

## 2020-02-18 RX ORDER — GLYBURIDE-METFORMIN HYDROCHLORIDE 5; 500 MG/1; MG/1
1 TABLET ORAL
Qty: 30 TAB | Refills: 3 | Status: SHIPPED | OUTPATIENT
Start: 2020-02-18 | End: 2020-07-31 | Stop reason: SDUPTHER

## 2020-02-18 ASSESSMENT — ENCOUNTER SYMPTOMS
PALPITATIONS: 0
ABDOMINAL PAIN: 0
WHEEZING: 0
FEVER: 0
DIARRHEA: 0
SHORTNESS OF BREATH: 0
CONSTIPATION: 0
WEIGHT LOSS: 0
COUGH: 0
BLOOD IN STOOL: 0
CHILLS: 0

## 2020-02-18 ASSESSMENT — PATIENT HEALTH QUESTIONNAIRE - PHQ9
CLINICAL INTERPRETATION OF PHQ2 SCORE: 2
5. POOR APPETITE OR OVEREATING: 1 - SEVERAL DAYS
SUM OF ALL RESPONSES TO PHQ QUESTIONS 1-9: 4

## 2020-02-18 NOTE — ASSESSMENT & PLAN NOTE
Yesterday he was doing his laundry and out of no where he could not breath.  He reports he was feeling anxious.  He was pacing back and forth.  It lasted for about 20 minutes.  He went to his car and started praying.  He could feel his anxiety and SOB lift.  He denies wheezing.

## 2020-02-18 NOTE — PROGRESS NOTES
Chief Complaint   Patient presents with   • Diabetes   • Hypertension   • Establish Care       Subjective:     HPI:   Christ Calixto is a 34 y.o. male here to establish care, medication concerns,  and to discuss the evaluation and management of:      Cerebrovascular accident (CVA) due to vascular occlusion (HCC)  Patient was admitted to [facility] on 1/29/2020 for suspected seizure.  MRI showed demyelinating disease versus CVA.  Neurology consult determine more likely CVA.  He was d/c'd on lipitor, prinzide, ASA 81, and glucovance.  He does not have any deficits.     Encounter to establish care  Prior PCP: none    Other Providers:  Neuro- [facility], est soon    Anxiety  Yesterday he was doing his laundry and out of no where he could not breath.  He reports he was feeling anxious.  He was pacing back and forth.  It lasted for about 20 minutes.  He went to his car and started praying.  He could feel his anxiety and SOB lift.  He denies wheezing.      HTN (hypertension)  New diagnosis.  Patient was discharged home from recent hospitalization on lisinopril-hydrochlorothiazide 20-25 mg daily.  He does note some blurred vision.  Denies headaches.      ROS  Review of Systems   Constitutional: Negative for chills, fever, malaise/fatigue and weight loss.   Respiratory: Negative for cough, shortness of breath and wheezing.    Cardiovascular: Negative for chest pain, palpitations and leg swelling.   Gastrointestinal: Negative for abdominal pain, blood in stool, constipation and diarrhea.         Not on File    Current medicines (including changes today)  Current Outpatient Medications   Medication Sig Dispense Refill   • atorvastatin (LIPITOR) 20 MG Tab Take 1 Tab by mouth every day. 30 Tab 3   • glyBURIDE-metFORMIN (GLUCOVANCE) 5-500 MG Tab Take 1 Tab by mouth every morning with breakfast. 30 Tab 3   • lisinopril-hydrochlorothiazide (PRINZIDE) 20-25 MG per tablet Take 1 Tab by mouth every day. 30 Tab 3   •  "aspirin (ASA) 81 MG Chew Tab chewable tablet Take 1 Tab by mouth every day. 100 Tab      No current facility-administered medications for this visit.      He  has a past medical history of Diabetes (HCC), Hypertension, Obesities, morbid (HCC), and Stroke (HCC).  He  has no past surgical history on file.  Social History     Tobacco Use   • Smoking status: Former Smoker     Packs/day: 0.50     Years: 8.00     Pack years: 4.00     Types: Cigarettes     Last attempt to quit: 2020     Years since quittin.0   • Smokeless tobacco: Never Used   Substance Use Topics   • Alcohol use: No   • Drug use: Not Currently     Comment: smokes marajuana        Family History   Problem Relation Age of Onset   • Diabetes Mother    • Diabetes Father    • Heart Disease Paternal Grandfather         MI     Family Status   Relation Name Status   • Mo  Alive   • Fa         Patient Active Problem List    Diagnosis Date Noted   • HTN (hypertension) 07/10/2014     Priority: High   • JAYLIN (obstructive sleep apnea) 2020     Priority: Medium   • DM (diabetes mellitus) (HCC) 2020     Priority: Medium   • Morbid obesity (HCC) 07/10/2014     Priority: Low   • Encounter to establish care 2020   • Anxiety 2020   • Cerebrovascular accident (CVA) due to vascular occlusion (HCC) 2020   • Fatty liver 2014   • Smoking 07/10/2014          Objective:     /92 (BP Location: Left arm, Patient Position: Sitting, BP Cuff Size: Adult long)   Pulse 85   Temp 36.3 °C (97.4 °F) (Temporal)   Resp 16   Ht 1.778 m (5' 10\")   Wt (!) 173.1 kg (381 lb 11.2 oz)   SpO2 95%  Body mass index is 54.77 kg/m².    Physical Exam:  Physical Exam   Constitutional: He is oriented to person, place, and time and well-developed, well-nourished, and in no distress. No distress.   HENT:   Head: Normocephalic.   Right Ear: Tympanic membrane and external ear normal.   Left Ear: Tympanic membrane and external ear normal.   Eyes: " Pupils are equal, round, and reactive to light. Conjunctivae and EOM are normal.   Neck: Normal range of motion. Neck supple. No tracheal deviation present.   Cardiovascular: Normal rate, regular rhythm, normal heart sounds and intact distal pulses.   Pulmonary/Chest: Effort normal and breath sounds normal.   Abdominal: Soft. Bowel sounds are normal.   Musculoskeletal: Normal range of motion.   Lymphadenopathy:        Head (right side): No preauricular adenopathy present.        Head (left side): No preauricular adenopathy present.     He has no cervical adenopathy.   Neurological: He is alert and oriented to person, place, and time. He has normal sensation, normal strength and intact cranial nerves. Gait normal.   Skin: Skin is warm and dry.   Psychiatric: Affect and judgment normal.          Assessment and Plan:     The following treatment plan was discussed:    1. Cerebrovascular accident (CVA) due to occlusion of other cerebral artery (HCC)  atorvastatin (LIPITOR) 20 MG Tab  - New problem.  Continue lipitor and ASA 81 daily.  Establish with neurology per discharge instructions.   2. Type 2 diabetes mellitus with hyperglycemia, without long-term current use of insulin ([facility])  REFERRAL TO PHARMACOTHERAPY SERVICE    MICROALBUMIN CREAT RATIO URINE    REFERRAL TO OPHTHALMOLOGY    glyBURIDE-metFORMIN (GLUCOVANCE) 5-500 MG Tab  -New problem.  Patient states he is never been diagnosed with diabetes before, although I show a A1c above 7 in 2015.  He has never been treated before.  He has been taking his Glucovance daily.  I have referred him to ophthalmologist due to his blurry vision, although I suspect this is related to his A1c above 12.  I also referred him to our pharmacist,Terrance Conti PharmD, for further evaluation and treatment.  We have scheduled him for the first available appointment with Terrance.   3. Essential hypertension  REFERRAL TO PHARMACOTHERAPY SERVICE    MICROALBUMIN CREAT RATIO URINE    REFERRAL TO  OPHTHALMOLOGY    lisinopril-hydrochlorothiazide (PRINZIDE) 20-25 MG per tablet  -Chronic problem, unstable.  Unclear chronicity as patient notes this problem is new to him. I also referred him to our pharmacist,Terrance Conti PharmD, for further evaluation and treatment.   4. Screening for metabolic disorder  TSH WITH REFLEX TO FT4   5. Encounter for vitamin deficiency screening  VITAMIN D,25 HYDROXY   6. Encounter to establish care     7. Anxiety   new problem.  Coping strategies discussed.  We will continue to monitor.       Any change or worsening of signs or symptoms, patient encouraged to follow-up or report to emergency room for further evaluation. Patient verbalizes understanding and agrees.    Follow-Up: Return in about 6 weeks (around 3/31/2020) for Hypertension, diabetes.  I have asked the patient to get his labs drawn before his appointment with our pharmacist.      PLEASE NOTE: This dictation was created using voice recognition software. I have made every reasonable attempt to correct obvious errors, but I expect that there are errors of grammar and possibly content that I did not discover before finalizing the note.

## 2020-02-18 NOTE — ASSESSMENT & PLAN NOTE
Patient was admitted to [facility] on 1/29/2020 for suspected seizure.  MRI showed demyelinating disease versus CVA.  Neurology consult determine more likely CVA.  He was d/c'd on lipitor, prinzide, ASA 81, and glucovance.  He does not have any deficits.

## 2020-02-18 NOTE — ASSESSMENT & PLAN NOTE
New diagnosis.  Patient was discharged home from recent hospitalization on lisinopril-hydrochlorothiazide 20-25 mg daily.  He does note some blurred vision.  Denies headaches.

## 2020-02-19 ENCOUNTER — HOSPITAL ENCOUNTER (OUTPATIENT)
Dept: LAB | Facility: MEDICAL CENTER | Age: 35
End: 2020-02-19
Attending: NURSE PRACTITIONER
Payer: MEDICAID

## 2020-02-19 DIAGNOSIS — E11.65 TYPE 2 DIABETES MELLITUS WITH HYPERGLYCEMIA, WITHOUT LONG-TERM CURRENT USE OF INSULIN (HCC): ICD-10-CM

## 2020-02-19 DIAGNOSIS — Z13.228 SCREENING FOR METABOLIC DISORDER: ICD-10-CM

## 2020-02-19 DIAGNOSIS — I10 ESSENTIAL HYPERTENSION: ICD-10-CM

## 2020-02-19 DIAGNOSIS — Z13.21 ENCOUNTER FOR VITAMIN DEFICIENCY SCREENING: ICD-10-CM

## 2020-02-19 PROCEDURE — 84443 ASSAY THYROID STIM HORMONE: CPT

## 2020-02-19 PROCEDURE — 82570 ASSAY OF URINE CREATININE: CPT

## 2020-02-19 PROCEDURE — 36415 COLL VENOUS BLD VENIPUNCTURE: CPT

## 2020-02-19 PROCEDURE — 82043 UR ALBUMIN QUANTITATIVE: CPT

## 2020-02-19 PROCEDURE — 82306 VITAMIN D 25 HYDROXY: CPT

## 2020-02-20 LAB
25(OH)D3 SERPL-MCNC: 24 NG/ML (ref 30–100)
CREAT UR-MCNC: 523.8 MG/DL
MICROALBUMIN UR-MCNC: 16.4 MG/DL
MICROALBUMIN/CREAT UR: 31 MG/G (ref 0–30)
TSH SERPL DL<=0.005 MIU/L-ACNC: 1.36 UIU/ML (ref 0.38–5.33)

## 2020-02-20 NOTE — RESULT ENCOUNTER NOTE
Please call pt and let them know that I got his lab results.  His thyroid function is normal.  His vitamin D level is slightly low, I would like to see him start taking an old over the counter vitamin D3 supplement, 2000 units daily.  I am seeing a very small amount of protein spilling from his kidneys, but I will see this improve as his blood pressure gets better controlled.  This is not something to worry about and we will recheck it again in a few months.

## 2020-03-31 ENCOUNTER — OFFICE VISIT (OUTPATIENT)
Dept: MEDICAL GROUP | Facility: MEDICAL CENTER | Age: 35
End: 2020-03-31
Attending: NURSE PRACTITIONER
Payer: MEDICAID

## 2020-03-31 DIAGNOSIS — E11.9 TYPE 2 DIABETES MELLITUS WITHOUT COMPLICATION, WITHOUT LONG-TERM CURRENT USE OF INSULIN (HCC): ICD-10-CM

## 2020-03-31 PROCEDURE — 99212 OFFICE O/P EST SF 10 MIN: CPT | Mod: CR | Performed by: NURSE PRACTITIONER

## 2020-03-31 NOTE — PROGRESS NOTES
Telephone Appointment Visit   As a means of avoiding spread of COVID-19, this visit is being conducted by telephone. This telephone visit was initiated by the patient and they verbally consented.    Time at start of call: 1042    Reason for Call:  Symptom Follow-up    Patient Comments / History:   He reports he has been doing well, he has been walking daily.  He quit smoking.  He reports he has stopped drinking soda. He reports he has been following all my instructions, including taking all his medications. He does not have a way to check his BP at home.  He does still have some anxiety, he has been doing breathing techniques.  He is not currently interested in medications.  He reports he has the anxiety under control.  He reports he is planning to go to [facility] in the next several days and check his blood pressure.  I have asked the patient to call me and leave a message of what his blood pressure reading is.    Labs / Images Reviewed   Chemistry panel, lipid panel, microalbumin creatinine ratio    Assessment and Plan:     1. Type 2 diabetes mellitus without complication, without long-term current use of insulin (HCC)  - Lipid Profile; Future  - HEMOGLOBIN A1C; Future  - MICROALBUMIN CREAT RATIO URINE; Future  -- Chronic problem, pt has implemented recommended lifestyle changes.  We will recheck his labs in late MAy/ early June to see his progress.     Follow-up: No follow-ups on file.    Time at end of call: 1058  Total Time Spent: 11-20 minutes    EDIN Bahena.

## 2020-05-07 ENCOUNTER — APPOINTMENT (OUTPATIENT)
Dept: NEUROLOGY | Facility: MEDICAL CENTER | Age: 35
End: 2020-05-07
Payer: MEDICAID

## 2020-05-26 ENCOUNTER — DOCUMENTATION (OUTPATIENT)
Dept: MEDICAL GROUP | Facility: MEDICAL CENTER | Age: 35
End: 2020-05-26

## 2020-05-26 NOTE — PROGRESS NOTES
Pt canceled his first appointment to establish at 03/24  I called and left a message on 03/25  I contacted the patient again later that week and he said that he is not sure about that referral and that he is going to wait until he talks to Erica Sylvester on 03/31/2020 before he decides to schedule an appointment.

## 2020-05-27 ENCOUNTER — APPOINTMENT (OUTPATIENT)
Dept: NEUROLOGY | Facility: MEDICAL CENTER | Age: 35
End: 2020-05-27
Payer: MEDICAID

## 2020-06-08 ENCOUNTER — HOSPITAL ENCOUNTER (OUTPATIENT)
Dept: LAB | Facility: MEDICAL CENTER | Age: 35
End: 2020-06-08
Attending: NURSE PRACTITIONER
Payer: MEDICAID

## 2020-06-08 DIAGNOSIS — E11.9 TYPE 2 DIABETES MELLITUS WITHOUT COMPLICATION, WITHOUT LONG-TERM CURRENT USE OF INSULIN (HCC): ICD-10-CM

## 2020-06-08 LAB
CHOLEST SERPL-MCNC: 91 MG/DL (ref 100–199)
EST. AVERAGE GLUCOSE BLD GHB EST-MCNC: 174 MG/DL
FASTING STATUS PATIENT QL REPORTED: NORMAL
HBA1C MFR BLD: 7.7 % (ref 0–5.6)
HDLC SERPL-MCNC: 31 MG/DL
LDLC SERPL CALC-MCNC: 46 MG/DL
TRIGL SERPL-MCNC: 68 MG/DL (ref 0–149)

## 2020-06-08 PROCEDURE — 82570 ASSAY OF URINE CREATININE: CPT

## 2020-06-08 PROCEDURE — 83036 HEMOGLOBIN GLYCOSYLATED A1C: CPT

## 2020-06-08 PROCEDURE — 82043 UR ALBUMIN QUANTITATIVE: CPT

## 2020-06-08 PROCEDURE — 80061 LIPID PANEL: CPT

## 2020-06-08 PROCEDURE — 36415 COLL VENOUS BLD VENIPUNCTURE: CPT

## 2020-06-09 ENCOUNTER — TELEPHONE (OUTPATIENT)
Dept: MEDICAL GROUP | Facility: MEDICAL CENTER | Age: 35
End: 2020-06-09

## 2020-06-09 LAB
CREAT UR-MCNC: 213.94 MG/DL
MICROALBUMIN UR-MCNC: 3.1 MG/DL
MICROALBUMIN/CREAT UR: 14 MG/G (ref 0–30)

## 2020-06-09 NOTE — RESULT ENCOUNTER NOTE
"Please call pt and let them know that I got his lab results.  His labs look much better than he did 3 to 4 months ago.  The lisinopril has significantly reduced the amount of protein that is spilling into his urine, but this is a great thing.  His A1c or average blood sugar has significantly reduced from 12.7-7.7.  We are almost at goal which would be 7 or under.  If he is interested in meeting with Terrance Conti PharmD, that may help him reach his goal.  If he is interested please schedule him for a new patient appointment with her.  His cholesterol numbers look really good, but his HDL or \"healthy fat\" is still low, although this has improved from 4 months ago.  Please encourage him to keep eating healthy fats like olive oil, salmon, avocado, walnuts, flaxseed.  Please let him know that I am really proud of the progress that he is made with his exercise and diet.  If he is not interested in meeting with Terrance, we can follow-up in about 3 to 4 months to recheck his progress.  Or  sooner as needed.  I would like him to continue his medications as ordered. thanks"

## 2020-06-09 NOTE — TELEPHONE ENCOUNTER
FINAL PRIOR AUTHORIZATION STATUS:    1.  Name of Medication & Dose: glyBuride-metFormin 5-500mg tabs     2. Prior Auth Status: Approved through [place] [employer]     3. Action Taken: Pharmacy Notified: yes Patient Notified: yes

## 2020-06-12 ENCOUNTER — TELEPHONE (OUTPATIENT)
Dept: MEDICAL GROUP | Facility: MEDICAL CENTER | Age: 35
End: 2020-06-12

## 2020-06-12 NOTE — TELEPHONE ENCOUNTER
"----- Message from MAGALY Bahena sent at 6/9/2020 11:03 AM PDT -----  Please call pt and let them know that I got his lab results.  His labs look much better than he did 3 to 4 months ago.  The lisinopril has significantly reduced the amount of protein that is spilling into his urine, but this is a great thing.  His A1c or average blood sugar has significantly reduced from 12.7-7.7.  We are almost at goal which would be 7 or under.  If he is interested in meeting with Terrance Conti PharmD, that may help him reach his goal.  If he is interested please schedule him for a new patient appointment with her.  His cholesterol numbers look really good, but his HDL or \"healthy fat\" is still low, although this has improved from 4 months ago.  Please encourage him to keep eating healthy fats like olive oil, salmon, avocado, walnuts, flaxseed.  Please let him know that I am really proud of the progress that he is made with his exercise and diet.  If he is not interested in meeting with Terrance, we can follow-up in about 3 to 4 months to recheck his progress.  Or  sooner as needed.  I would like him to continue his medications as ordered. thanks  "

## 2020-07-02 ENCOUNTER — HOSPITAL ENCOUNTER (OUTPATIENT)
Dept: LAB | Facility: MEDICAL CENTER | Age: 35
End: 2020-07-02
Payer: MEDICAID

## 2020-07-02 ENCOUNTER — OFFICE VISIT (OUTPATIENT)
Dept: NEUROLOGY | Facility: MEDICAL CENTER | Age: 35
End: 2020-07-02
Payer: MEDICAID

## 2020-07-02 VITALS
BODY MASS INDEX: 45.1 KG/M2 | WEIGHT: 315 LBS | SYSTOLIC BLOOD PRESSURE: 120 MMHG | DIASTOLIC BLOOD PRESSURE: 78 MMHG | TEMPERATURE: 98.5 F | HEIGHT: 70 IN | RESPIRATION RATE: 16 BRPM | HEART RATE: 91 BPM | OXYGEN SATURATION: 97 %

## 2020-07-02 DIAGNOSIS — I63.9 CEREBROVASCULAR ACCIDENT (CVA), UNSPECIFIED MECHANISM (HCC): ICD-10-CM

## 2020-07-02 DIAGNOSIS — R56.9 SEIZURE (HCC): ICD-10-CM

## 2020-07-02 LAB — HCYS SERPL-SCNC: 13.68 UMOL/L

## 2020-07-02 PROCEDURE — 85220 BLOOC CLOT FACTOR V TEST: CPT

## 2020-07-02 PROCEDURE — 85303 CLOT INHIBIT PROT C ACTIVITY: CPT

## 2020-07-02 PROCEDURE — 85613 RUSSELL VIPER VENOM DILUTED: CPT

## 2020-07-02 PROCEDURE — 85732 THROMBOPLASTIN TIME PARTIAL: CPT

## 2020-07-02 PROCEDURE — 85670 THROMBIN TIME PLASMA: CPT

## 2020-07-02 PROCEDURE — 81241 F5 GENE: CPT

## 2020-07-02 PROCEDURE — 99214 OFFICE O/P EST MOD 30 MIN: CPT

## 2020-07-02 PROCEDURE — 85306 CLOT INHIBIT PROT S FREE: CPT

## 2020-07-02 PROCEDURE — 83090 ASSAY OF HOMOCYSTEINE: CPT

## 2020-07-02 PROCEDURE — 85610 PROTHROMBIN TIME: CPT

## 2020-07-02 PROCEDURE — 86147 CARDIOLIPIN ANTIBODY EA IG: CPT

## 2020-07-02 PROCEDURE — 36415 COLL VENOUS BLD VENIPUNCTURE: CPT

## 2020-07-02 PROCEDURE — 85520 HEPARIN ASSAY: CPT

## 2020-07-02 PROCEDURE — 85730 THROMBOPLASTIN TIME PARTIAL: CPT

## 2020-07-02 ASSESSMENT — ENCOUNTER SYMPTOMS
PSYCHIATRIC NEGATIVE: 1
RESPIRATORY NEGATIVE: 1
MUSCULOSKELETAL NEGATIVE: 1
CARDIOVASCULAR NEGATIVE: 1
NEUROLOGICAL NEGATIVE: 1
EYES NEGATIVE: 1
GASTROINTESTINAL NEGATIVE: 1

## 2020-07-02 ASSESSMENT — FIBROSIS 4 INDEX: FIB4 SCORE: 0.94

## 2020-07-02 NOTE — PROGRESS NOTES
Follow up appointment for recent stroke in January 2020     HPI  34 yo morbidly obese male with PMH of smoking,excessive ETOH consumption, hypertension and DM and HL who presented to [facility] ED in January with a possible seizure episode with confusion and tongue biting.  His EEG was normal and his brain MRI without contrast revealed 2 strokes in the left parieto-occipital distribution which corresponds to the MCA territory.  Etiology of stroke is unknown however the patient has several vascular risk factors including morbid obesity, as well as uncontrolled diabetes.  This is the most likely etiology however since he is 34 years old a stroke in the young work-up should be performed.  Vascular imaging studies were not consistent with a vasculitis or vasculopathy or dissection.  He was sent to obtain hypercoagulable panel and this had never happened and the patient got discharged home.  He had since managed to exercise more, quit smoking and consuming alcohol and is working on weight loss. Weight loss is not going too well so he is interested in trying a medication.  No neurological symptoms at this time., A1C was 12 now down to 7.7  He takes BP meds, statin and ASA daily.  No further seizure like episodes, confusional spells or loss of consciousness.  Feeling much better and physically getting fit.  FH No clotting disorders no miscarriages  No PE no dvts  He personally never had any issues with blood clotting in the past.      Review of Systems   Constitutional: Negative for malaise/fatigue.   HENT: Negative.    Eyes: Negative.    Respiratory: Negative.    Cardiovascular: Negative.    Gastrointestinal: Negative.    Genitourinary: Negative.    Musculoskeletal: Negative.    Skin: Negative.    Neurological: Negative.    Endo/Heme/Allergies: Negative.    Psychiatric/Behavioral: Negative.      Past Medical History:   Diagnosis Date   • Diabetes (HCC)    • Hypertension     first  discovered 2012   • Obesities, morbid (HCC)      since childhood   • Stroke (HCC)    History reviewed. No pertinent surgical history.     History reviewed. No pertinent surgical history.     Family History   Problem Relation Age of Onset   • Diabetes Mother    • Diabetes Father    • Heart Disease Paternal Grandfather         MI     Social History     Socioeconomic History   • Marital status: Single     Spouse name: Not on file   • Number of children: Not on file   • Years of education: Not on file   • Highest education level: Not on file   Occupational History   • Not on file   Social Needs   • Financial resource strain: Not on file   • Food insecurity     Worry: Not on file     Inability: Not on file   • Transportation needs     Medical: Not on file     Non-medical: Not on file   Tobacco Use   • Smoking status: Former Smoker     Packs/day: 0.50     Years: 8.00     Pack years: 4.00     Types: Cigarettes     Last attempt to quit: 2020     Years since quittin.4   • Smokeless tobacco: Never Used   Substance and Sexual Activity   • Alcohol use: No   • Drug use: Not Currently     Comment: smokes marajuana    • Sexual activity: Not Currently   Lifestyle   • Physical activity     Days per week: Not on file     Minutes per session: Not on file   • Stress: Not on file   Relationships   • Social connections     Talks on phone: Not on file     Gets together: Not on file     Attends Orthodox service: Not on file     Active member of club or organization: Not on file     Attends meetings of clubs or organizations: Not on file     Relationship status: Not on file   • Intimate partner violence     Fear of current or ex partner: Not on file     Emotionally abused: Not on file     Physically abused: Not on file     Forced sexual activity: Not on file   Other Topics Concern   • Not on file   Social History Narrative   • Not on file     Current Outpatient Medications on File Prior to Visit   Medication Sig Dispense Refill   • atorvastatin (LIPITOR) 20 MG Tab Take 1 Tab  "by mouth every day. 30 Tab 3   • glyBURIDE-metFORMIN (GLUCOVANCE) 5-500 MG Tab Take 1 Tab by mouth every morning with breakfast. 30 Tab 3   • lisinopril-hydrochlorothiazide (PRINZIDE) 20-25 MG per tablet Take 1 Tab by mouth every day. 30 Tab 3   • aspirin (ASA) 81 MG Chew Tab chewable tablet Take 1 Tab by mouth every day. 100 Tab      No current facility-administered medications on file prior to visit.    Not on File     Encounter Vitals  Standard Vitals  Vitals  Blood Pressure: 120/78  Temperature: 36.9 °C (98.5 °F)  Temp src: Temporal  Pulse: 91  Respiration: 16  Pulse Oximetry: 97 %  Height: 177.8 cm (5' 10\")  Weight: (!) 176.4 kg (389 lb)  Encounter Vitals  Temperature: 36.9 °C (98.5 °F)  Temp src: Temporal  Blood Pressure: 120/78  Pulse: 91  Respiration: 16  Pulse Oximetry: 97 %  Weight: (!) 176.4 kg (389 lb)  Height: 177.8 cm (5' 10\")  BMI (Calculated): 55.82  Pulmonary-Specific Vitals     Durable Medical Equipment-Specific Vitals   General: Well-appearing 35-year-old male in no acute distress.  Cardio: Normal S1/S2. No peripheral edema.   Pulm: CTAX2. No respiratory distress.   Skin: Warm, dry, no rashes or lesions   Psychiatric: Appropriate affect. No active psychosis.  HEENT: Atraumatic head, normal sclera and conjunctiva, moist oral mucosa. No lid lag.  Abdomen: Soft, non tender. No masses or hepatosplenomegaly.     Neurologic:  Mental Status:  AAOx4. Able to follow commands/cross midline. Speech fluent/nondysarthric. Language functions intact. No neglect/apraxia.  Cranial Nerves:  PERRL. EOMi. Face symmetric, palate/tongue midline. Visual fields full to confrontation. Facial sensation intact.   Motor:  Normal muscle tone and bulk. Strength is 5/5 throughout. No abnormal movements.  Reflexes: Deferred  Coordination: Finger-to-nose without ataxia  Sensation: Normal to light touch throughout  Gait/Station: Normal stance and stride   Can perform tandem gait   Walks on tiptoes and heels     Lab Results "   Component Value Date/Time    SODIUM 137 01/30/2020 03:40 AM    POTASSIUM 3.4 (L) 01/30/2020 03:40 AM    CHLORIDE 107 01/30/2020 03:40 AM    CO2 22 01/30/2020 03:40 AM    GLUCOSE 250 (H) 01/30/2020 03:40 AM    BUN 15 01/30/2020 03:40 AM    CREATININE 0.80 01/30/2020 03:40 AM      Lab Results   Component Value Date/Time    WBC 6.9 01/30/2020 03:40 AM    RBC 4.74 01/30/2020 03:40 AM    HEMOGLOBIN 14.2 01/30/2020 03:40 AM    HEMATOCRIT 41.2 (L) 01/30/2020 03:40 AM    MCV 86.9 01/30/2020 03:40 AM    MCH 30.0 01/30/2020 03:40 AM    MCHC 34.5 01/30/2020 03:40 AM    MPV 9.9 01/30/2020 03:40 AM    NEUTSPOLYS 70.20 01/30/2020 03:40 AM    LYMPHOCYTES 21.40 (L) 01/30/2020 03:40 AM    MONOCYTES 6.70 01/30/2020 03:40 AM    EOSINOPHILS 0.90 01/30/2020 03:40 AM    BASOPHILS 0.40 01/30/2020 03:40 AM        Imaging      EC-ECHOCARDIOGRAM COMPLETE W/ CONT   Final Result       CT-HEAD W/O   Final Result       Head CT without contrast within normal limits. No evidence of acute cerebral infarction, hemorrhage or mass lesion.       MR-BRAIN-W/O   Final Result       1.  7 mm lesion in the left lateral occipital lobe-parietal lobe in the deep white matter with very bright diffusion signal. The lesion is very well circumscribed. Possibly an acute infarct. An active demyelinating lesion might show a similar appearance.   2.  More superiorly in the left parietal lobe traversing the deep white matter and cortex, there is a small bandlike lesion also showing very bright diffusion signal. This may represent acute infarction. There is no hemorrhagic transformation.   3.  No lesions are evident involving the brainstem or cerebellum.   4.  Follow-up postcontrast imaging may be helpful. (Although postcontrast imaging was recommended, this exam was obtained without gadolinium contrast).       CT-HEAD W/O   Final Result       No evidence of acute intracranial process.       CT-CTA HEAD WITH & W/O-POST PROCESS    (Results Pending)   CT-CTA NECK WITH &  W/O-POST PROCESSING    (Results Pending)        Presumed mechanism by TOAST:  __Large Artery Atherosclerosis  _Small Vessel (Lacunar)  __Cardioembolic  __Other (Sickle Cell, Vasculitis, Hypercoagulable)  x__Unknown        Impression and plan   34 yo male with one episode of seizure like activity in January and two small ischemic infarcts L occipital and left  parietal  lobe (images without contrast) - unsure of mechanism for stroke   No further neurological symptoms since   Plan  A1C goal <6.5 he is 7.7   LDL goal <70   HDL >50  BP goal <130   Given conspicuous presentation as well as MRI brain appearance of these lesions will obtain contrasted imaging to r/o tumor like lesion however possible in situ thrombosis or artery to artery emboli can not be ruled out  At this time mechanism for strokes remains cryptogenic   Vessel imaging did not reveal any stenosis/occlusion   Hypercoagulable panel was supposed to be done however patient was discharged from hospital and this never happened.     1.  LDL 65.  At goal.  Atorvastatin 40 mg daily.  2.  Hemoglobin A1c 13.  Will need to be below 7.  3.  Outpatient systolic blood pressure goal 130  4.  Initiate aspirin 81 mg daily  5. Obtain Hypercoagulable panel              -Anticardiolipin antibody (IgG, IgM, IgA)              -Antithrombin 3 activity              -Lupus anticoagulant              -Factor V Leiden Gene Mutation.              -Factor 8 activity              -Homocysteine              -Protein C activity              -Protein S activity              -Prothrombin gene mutation ([id number])              -Gregory Viper Venom Time  If any hypercoagulable disorder - may need AC with warfarin or NOAC and hematology referral    Addendum   Labs reviewed   Stat hematology referral     rtc after imaging and labs

## 2020-07-04 LAB
APTT 1H NP PPP: 37.7 SEC (ref 24.7–36)
APTT 1H P INC POOL PPP: 30.2 SEC (ref 24.7–36)
APTT 1H P INC PPP: 45.2 SEC (ref 24.7–36)
APTT IMM NP PPP: 38.5 SEC (ref 24.7–36)
APTT POOL PPP: 29.5 SEC (ref 24.7–36)
APTT PPP: 37.9 SEC (ref 24.7–36)
APTT PPP: 37.9 SEC (ref 24.7–36)
DRVVT MIX 37 DRVMX37: 58.8 SEC (ref 28–48)
DRVVT MIX IMMEDIATE DRVMXI: 58.6 SEC (ref 28–48)
INR PPP: 0.94 (ref 0.87–1.13)
LA PPP-IMP: POSITIVE
PROTHROMBIN TIME: 12.9 SEC (ref 12–14.6)
SCREEN DRVVT: 95.4 SEC (ref 28–48)
THROMBIN TIME: 17.6 SEC
UFH PPP CHRO-ACNC: <0.1 U/ML

## 2020-07-05 LAB
PROT C ACT/NOR PPP: 136 % (ref 83–168)
PROT S ACT/NOR PPP: 70 % (ref 66–143)

## 2020-07-06 DIAGNOSIS — I63.59 CEREBROVASCULAR ACCIDENT (CVA) DUE TO OCCLUSION OF OTHER CEREBRAL ARTERY (HCC): ICD-10-CM

## 2020-07-06 DIAGNOSIS — D68.59 HYPERCOAGULOPATHY (HCC): ICD-10-CM

## 2020-07-06 DIAGNOSIS — I63.9 CEREBROVASCULAR ACCIDENT (CVA), UNSPECIFIED MECHANISM (HCC): ICD-10-CM

## 2020-07-06 LAB — FACT V ACT/NOR PPP: 80 % (ref 62–140)

## 2020-07-06 NOTE — RESULT ENCOUNTER NOTE
I think he will need to be started on anticoagulants -based on studies warfarin is better than NOAC . given positive lupus anticoagulant - I am not sure f hematology needs any additional studies to determine his antiphospholipid syndrome ?  Given the strokes - hopefully they can see his stat.  Thanks!    ND

## 2020-07-07 LAB — F5 P.R506Q BLD/T QL: NEGATIVE

## 2020-07-21 ENCOUNTER — HOSPITAL ENCOUNTER (OUTPATIENT)
Dept: LAB | Facility: MEDICAL CENTER | Age: 35
End: 2020-07-21
Attending: INTERNAL MEDICINE
Payer: MEDICAID

## 2020-07-21 LAB
ALBUMIN SERPL BCP-MCNC: 3.7 G/DL (ref 3.2–4.9)
ALBUMIN/GLOB SERPL: 0.9 G/DL
ALP SERPL-CCNC: 73 U/L (ref 30–99)
ALT SERPL-CCNC: 21 U/L (ref 2–50)
ANION GAP SERPL CALC-SCNC: 13 MMOL/L (ref 7–16)
AST SERPL-CCNC: 20 U/L (ref 12–45)
BASOPHILS # BLD AUTO: 0.7 % (ref 0–1.8)
BASOPHILS # BLD: 0.05 K/UL (ref 0–0.12)
BILIRUB SERPL-MCNC: 1.2 MG/DL (ref 0.1–1.5)
BUN SERPL-MCNC: 20 MG/DL (ref 8–22)
CALCIUM SERPL-MCNC: 8.9 MG/DL (ref 8.5–10.5)
CHLORIDE SERPL-SCNC: 101 MMOL/L (ref 96–112)
CO2 SERPL-SCNC: 23 MMOL/L (ref 20–33)
CREAT SERPL-MCNC: 1.25 MG/DL (ref 0.5–1.4)
CRP SERPL HS-MCNC: 2.33 MG/DL (ref 0–0.75)
EOSINOPHIL # BLD AUTO: 0.08 K/UL (ref 0–0.51)
EOSINOPHIL NFR BLD: 1.1 % (ref 0–6.9)
ERYTHROCYTE [DISTWIDTH] IN BLOOD BY AUTOMATED COUNT: 40.5 FL (ref 35.9–50)
ERYTHROCYTE [SEDIMENTATION RATE] IN BLOOD BY WESTERGREN METHOD: 90 MM/HOUR (ref 0–15)
GLOBULIN SER CALC-MCNC: 4 G/DL (ref 1.9–3.5)
GLUCOSE SERPL-MCNC: 167 MG/DL (ref 65–99)
HCT VFR BLD AUTO: 43.3 % (ref 42–52)
HGB BLD-MCNC: 14.4 G/DL (ref 14–18)
IMM GRANULOCYTES # BLD AUTO: 0.05 K/UL (ref 0–0.11)
IMM GRANULOCYTES NFR BLD AUTO: 0.7 % (ref 0–0.9)
LDH SERPL L TO P-CCNC: 246 U/L (ref 107–266)
LYMPHOCYTES # BLD AUTO: 1.39 K/UL (ref 1–4.8)
LYMPHOCYTES NFR BLD: 19.1 % (ref 22–41)
MCH RBC QN AUTO: 29.8 PG (ref 27–33)
MCHC RBC AUTO-ENTMCNC: 33.3 G/DL (ref 33.7–35.3)
MCV RBC AUTO: 89.6 FL (ref 81.4–97.8)
MONOCYTES # BLD AUTO: 0.46 K/UL (ref 0–0.85)
MONOCYTES NFR BLD AUTO: 6.3 % (ref 0–13.4)
NEUTROPHILS # BLD AUTO: 5.26 K/UL (ref 1.82–7.42)
NEUTROPHILS NFR BLD: 72.1 % (ref 44–72)
NRBC # BLD AUTO: 0 K/UL
NRBC BLD-RTO: 0 /100 WBC
PLATELET # BLD AUTO: 210 K/UL (ref 164–446)
PMV BLD AUTO: 9.9 FL (ref 9–12.9)
POTASSIUM SERPL-SCNC: 4.2 MMOL/L (ref 3.6–5.5)
PROT SERPL-MCNC: 7.7 G/DL (ref 6–8.2)
RBC # BLD AUTO: 4.83 M/UL (ref 4.7–6.1)
RHEUMATOID FACT SER IA-ACNC: <10 IU/ML (ref 0–14)
SODIUM SERPL-SCNC: 137 MMOL/L (ref 135–145)
WBC # BLD AUTO: 7.3 K/UL (ref 4.8–10.8)

## 2020-07-21 PROCEDURE — 80053 COMPREHEN METABOLIC PANEL: CPT

## 2020-07-21 PROCEDURE — 86038 ANTINUCLEAR ANTIBODIES: CPT

## 2020-07-21 PROCEDURE — 83615 LACTATE (LD) (LDH) ENZYME: CPT

## 2020-07-21 PROCEDURE — 85025 COMPLETE CBC W/AUTO DIFF WBC: CPT

## 2020-07-21 PROCEDURE — 85300 ANTITHROMBIN III ACTIVITY: CPT

## 2020-07-21 PROCEDURE — 86431 RHEUMATOID FACTOR QUANT: CPT

## 2020-07-21 PROCEDURE — 86146 BETA-2 GLYCOPROTEIN ANTIBODY: CPT | Mod: 91

## 2020-07-21 PROCEDURE — 86039 ANTINUCLEAR ANTIBODIES (ANA): CPT

## 2020-07-21 PROCEDURE — 85520 HEPARIN ASSAY: CPT

## 2020-07-21 PROCEDURE — 86235 NUCLEAR ANTIGEN ANTIBODY: CPT

## 2020-07-21 PROCEDURE — 81270 JAK2 GENE: CPT

## 2020-07-21 PROCEDURE — 86225 DNA ANTIBODY NATIVE: CPT

## 2020-07-21 PROCEDURE — 36415 COLL VENOUS BLD VENIPUNCTURE: CPT

## 2020-07-21 PROCEDURE — 85610 PROTHROMBIN TIME: CPT

## 2020-07-21 PROCEDURE — 85652 RBC SED RATE AUTOMATED: CPT

## 2020-07-21 PROCEDURE — 85613 RUSSELL VIPER VENOM DILUTED: CPT

## 2020-07-21 PROCEDURE — 86256 FLUORESCENT ANTIBODY TITER: CPT

## 2020-07-21 PROCEDURE — 85730 THROMBOPLASTIN TIME PARTIAL: CPT

## 2020-07-21 PROCEDURE — 86140 C-REACTIVE PROTEIN: CPT

## 2020-07-23 LAB
B2 GLYCOPROT1 IGA SER-ACNC: 3 SAU (ref 0–20)
B2 GLYCOPROT1 IGG SERPL IA-ACNC: 10 SGU (ref 0–20)
B2 GLYCOPROT1 IGM SERPL IA-ACNC: 1 SMU (ref 0–20)
NUCLEAR IGG SER QL IA: DETECTED

## 2020-07-24 LAB
ANA INTERPRETIVE COMMENT Q5143: ABNORMAL
ANA PATTERN Q5144: ABNORMAL
ANA TITER Q5145: ABNORMAL
ANTINUCLEAR ANTIBODY (ANA), HEP-2, IGG Q5142: DETECTED
AT III ACT/NOR PPP CHRO: 104 % (ref 76–128)

## 2020-07-25 LAB — DSDNA AB TITR SER CLIF: DETECTED {TITER}

## 2020-07-26 LAB
ENA JO1 AB TITR SER: 6 AU/ML (ref 0–40)
ENA SCL70 IGG SER QL: 4 AU/ML (ref 0–40)
ENA SM IGG SER-ACNC: 2 AU/ML (ref 0–40)
ENA SS-B IGG SER IA-ACNC: 24 AU/ML (ref 0–40)
SSA52 R0ENA AB IGG Q0420: 131 AU/ML (ref 0–40)
SSA60 R0ENA AB IGG Q0419: 122 AU/ML (ref 0–40)
TEST NAME 95000: NORMAL
U1 SNRNP IGG SER QL: 6 AU/ML (ref 0–40)

## 2020-07-27 LAB — DSDNA IGG TITR SER CLIF: ABNORMAL {TITER}

## 2020-07-31 DIAGNOSIS — E11.65 TYPE 2 DIABETES MELLITUS WITH HYPERGLYCEMIA, WITHOUT LONG-TERM CURRENT USE OF INSULIN (HCC): ICD-10-CM

## 2020-07-31 DIAGNOSIS — I10 ESSENTIAL HYPERTENSION: ICD-10-CM

## 2020-07-31 DIAGNOSIS — I63.59 CEREBROVASCULAR ACCIDENT (CVA) DUE TO OCCLUSION OF OTHER CEREBRAL ARTERY (HCC): ICD-10-CM

## 2020-07-31 RX ORDER — ASPIRIN 81 MG/1
81 TABLET, CHEWABLE ORAL DAILY
Qty: 100 TAB | Refills: 5 | Status: SHIPPED | OUTPATIENT
Start: 2020-07-31

## 2020-07-31 RX ORDER — ATORVASTATIN CALCIUM 20 MG/1
20 TABLET, FILM COATED ORAL DAILY
Qty: 30 TAB | Refills: 3 | Status: SHIPPED | OUTPATIENT
Start: 2020-07-31 | End: 2021-02-01

## 2020-07-31 RX ORDER — GLYBURIDE-METFORMIN HYDROCHLORIDE 5; 500 MG/1; MG/1
1 TABLET ORAL
Qty: 30 TAB | Refills: 3 | Status: SHIPPED | OUTPATIENT
Start: 2020-07-31 | End: 2021-02-01

## 2020-07-31 RX ORDER — LISINOPRIL AND HYDROCHLOROTHIAZIDE 25; 20 MG/1; MG/1
1 TABLET ORAL DAILY
Qty: 30 TAB | Refills: 3 | Status: SHIPPED | OUTPATIENT
Start: 2020-07-31 | End: 2021-02-01

## 2020-08-05 ENCOUNTER — TELEPHONE (OUTPATIENT)
Dept: MEDICAL GROUP | Facility: MEDICAL CENTER | Age: 35
End: 2020-08-05

## 2020-08-05 NOTE — TELEPHONE ENCOUNTER
Left message with patient about no show to appointment today 8/5/20.  Explained this was his first no show and the no show policy.

## 2020-08-10 ENCOUNTER — HOSPITAL ENCOUNTER (OUTPATIENT)
Dept: LAB | Facility: MEDICAL CENTER | Age: 35
End: 2020-08-10
Payer: MEDICAID

## 2020-08-10 ENCOUNTER — HOSPITAL ENCOUNTER (OUTPATIENT)
Dept: LAB | Facility: MEDICAL CENTER | Age: 35
End: 2020-08-10
Attending: INTERNAL MEDICINE
Payer: MEDICAID

## 2020-08-10 DIAGNOSIS — I63.9 CEREBROVASCULAR ACCIDENT (CVA), UNSPECIFIED MECHANISM (HCC): ICD-10-CM

## 2020-08-10 LAB
FACT VIII ACT/NOR PPP: 271 % (ref 45–145)
INHIBITOR INDICATED 1863: YES
INR PPP: 1.69 (ref 0.87–1.13)
PROTHROMBIN TIME: 20.4 SEC (ref 12–14.6)

## 2020-08-10 PROCEDURE — 85230 CLOT FACTOR VII PROCONVERTIN: CPT

## 2020-08-10 PROCEDURE — 85240 CLOT FACTOR VIII AHG 1 STAGE: CPT

## 2020-08-10 PROCEDURE — 85260 CLOT FACTOR X STUART-POWER: CPT

## 2020-08-10 PROCEDURE — 85306 CLOT INHIBIT PROT S FREE: CPT

## 2020-08-10 PROCEDURE — 36415 COLL VENOUS BLD VENIPUNCTURE: CPT

## 2020-08-10 PROCEDURE — 85610 PROTHROMBIN TIME: CPT

## 2020-08-12 ENCOUNTER — HOSPITAL ENCOUNTER (OUTPATIENT)
Dept: LAB | Facility: MEDICAL CENTER | Age: 35
End: 2020-08-12
Attending: INTERNAL MEDICINE
Payer: MEDICAID

## 2020-08-12 LAB
FACT VII ACT/NOR PPP: 24 % (ref 80–181)
FACT X ACT/NOR PPP: 32 % (ref 81–157)
INR PPP: 2.42 (ref 0.87–1.13)
PROT S ACT/NOR PPP: 40 % (ref 66–143)
PROTHROMBIN TIME: 27.1 SEC (ref 12–14.6)

## 2020-08-12 PROCEDURE — 36415 COLL VENOUS BLD VENIPUNCTURE: CPT

## 2020-08-12 PROCEDURE — 85610 PROTHROMBIN TIME: CPT

## 2020-08-13 ENCOUNTER — HOSPITAL ENCOUNTER (OUTPATIENT)
Dept: LAB | Facility: MEDICAL CENTER | Age: 35
End: 2020-08-13
Attending: INTERNAL MEDICINE
Payer: MEDICAID

## 2020-08-13 LAB
INR PPP: 2.2 (ref 0.87–1.13)
PROTHROMBIN TIME: 25.1 SEC (ref 12–14.6)

## 2020-08-13 PROCEDURE — 85610 PROTHROMBIN TIME: CPT

## 2020-08-13 PROCEDURE — 36415 COLL VENOUS BLD VENIPUNCTURE: CPT

## 2020-08-14 ENCOUNTER — HOSPITAL ENCOUNTER (OUTPATIENT)
Dept: LAB | Facility: MEDICAL CENTER | Age: 35
End: 2020-08-14
Attending: INTERNAL MEDICINE
Payer: MEDICAID

## 2020-08-14 LAB
INR PPP: 1.91 (ref 0.87–1.13)
PROTHROMBIN TIME: 22.4 SEC (ref 12–14.6)

## 2020-08-14 PROCEDURE — 36415 COLL VENOUS BLD VENIPUNCTURE: CPT

## 2020-08-14 PROCEDURE — 85610 PROTHROMBIN TIME: CPT

## 2020-08-17 ENCOUNTER — HOSPITAL ENCOUNTER (OUTPATIENT)
Dept: LAB | Facility: MEDICAL CENTER | Age: 35
End: 2020-08-17
Attending: INTERNAL MEDICINE
Payer: MEDICAID

## 2020-08-17 LAB
INR PPP: 1.64 (ref 0.87–1.13)
PROTHROMBIN TIME: 19.9 SEC (ref 12–14.6)

## 2020-08-17 PROCEDURE — 85610 PROTHROMBIN TIME: CPT

## 2020-08-17 PROCEDURE — 36415 COLL VENOUS BLD VENIPUNCTURE: CPT

## 2020-08-19 ENCOUNTER — ANTICOAGULATION MONITORING (OUTPATIENT)
Dept: VASCULAR LAB | Facility: MEDICAL CENTER | Age: 35
End: 2020-08-19

## 2020-08-19 NOTE — PROGRESS NOTES
See referral in media tab    Pt has a PMH of acute embolic stroke on  1/30/2020 and has been on ASA daily  He was seen by neuro who ordered hypercoagulable test 7/2/2020 which showed positive lupus anticoagulant, concerning for antiphospholipid syndrome.    He was seen by Heme/Onc 8/6/2020, who ordered second lupus test in 3 months to confirm and wants pt on OAC in the mean time.    Pt has been taking 5 mg daily since 8/6. Yesterday 8/18, he was told to start alternating between 10 mg and 5 mg every other day.    Appt scheduled for 8/21/2020 @ 4:15PM    Eloisa Snyder, ArmandoD

## 2020-08-21 ENCOUNTER — ANTICOAGULATION VISIT (OUTPATIENT)
Dept: VASCULAR LAB | Facility: MEDICAL CENTER | Age: 35
End: 2020-08-21
Attending: INTERNAL MEDICINE
Payer: MEDICAID

## 2020-08-21 ENCOUNTER — TELEPHONE (OUTPATIENT)
Dept: VASCULAR LAB | Facility: MEDICAL CENTER | Age: 35
End: 2020-08-21

## 2020-08-21 DIAGNOSIS — R76.0 LUPUS ANTICOAGULANT POSITIVE: ICD-10-CM

## 2020-08-21 LAB — INR PPP: 2.6 (ref 2–3.5)

## 2020-08-21 PROCEDURE — 85610 PROTHROMBIN TIME: CPT

## 2020-08-21 PROCEDURE — 99213 OFFICE O/P EST LOW 20 MIN: CPT

## 2020-08-21 NOTE — PROGRESS NOTES
Anticoagulation Summary  As of 2020    INR goal:  2.0-3.0   TTR:  --   INR used for dosin.60 (2020)   Warfarin maintenance plan:  5 mg (5 mg x 1), then 10 mg (10 mg x 1) repeating every 2 days   Average weekly warfarin total:  52.5 mg   Plan last modified:  Silvia ALIZA Filter (2020)   Next INR check:  2020   Target end date:  11/3/2020    Indications    Lupus anticoagulant positive [R76.0]             Anticoagulation Episode Summary     INR check location:      Preferred lab:      Send INR reminders to:      Comments:        Anticoagulation Care Providers     Provider Role Specialty Phone number    Aayush Parra M.D. Referring Oncology [phone]    [facility] Responsible  [phone]    Julio Gibbons, PharmD Responsible          Anticoagulation Patient Findings          Pt is new to warfarin and new to [facility].  Discussed indication for warfarin therapy and INR goal range. Explained our services, hours of operation, warfarin therapy, potential SE, potential DI. Discussed diet at length, with an emphasis on foods rich in vitamin K.  Discussed monitoring parameters, such as blood in urine, blood in stool, discussed what to do if a dose is missed, or suspected as missed.  Emphasized importance of compliance including follow up. Discussed lifestyle choices of ETOH & smoking and its impact on therapy.       pt is s/p embolic stroke 2020 and has been anticoagulated since .  Pt was previously on asa.  [facility].  Pt has tested positive for lupus anticoagulant, and Dr. Parra would like him anticoagulated for at least three months.  Pt does have a follow up appointment with Dr. Parra 11-3-2020 (Last office visit note is scanned into media)  Pt admits to smoking pot to relax, we did discuss then need for complete abstinence of all smoking. Pt does not drink ETOH.  Follow up in 3 days, to reduce the risk of adverse  events related to this high risk medication, warfarin.    Silvia Petty, Clinical Pharmacist        Added [facility] to care team

## 2020-08-22 NOTE — TELEPHONE ENCOUNTER
Initial anticoag note and most recent hematology note reviewed.    Pending further recs we will continue with 3 mo of anticoag with warfarin as recommended by hematology for h/o stroke with + lupus anticoag    Will defer all other w/u and management, aside from measuring INR and dosing anticaogulation, to heme and pcp.    Michael Bloch, MD  Anticoagulation Clinic    Cc:    ALIZA Parra

## 2020-08-24 ENCOUNTER — ANTICOAGULATION VISIT (OUTPATIENT)
Dept: VASCULAR LAB | Facility: MEDICAL CENTER | Age: 35
End: 2020-08-24
Attending: INTERNAL MEDICINE
Payer: MEDICAID

## 2020-08-24 VITALS — HEART RATE: 84 BPM | DIASTOLIC BLOOD PRESSURE: 99 MMHG | SYSTOLIC BLOOD PRESSURE: 139 MMHG

## 2020-08-24 DIAGNOSIS — R76.0 LUPUS ANTICOAGULANT POSITIVE: ICD-10-CM

## 2020-08-24 DIAGNOSIS — I63.59 CEREBROVASCULAR ACCIDENT (CVA) DUE TO OCCLUSION OF OTHER CEREBRAL ARTERY (HCC): ICD-10-CM

## 2020-08-24 LAB
INR BLD: 3.4 (ref 0.9–1.2)
INR PPP: 3.4 (ref 2–3.5)

## 2020-08-24 PROCEDURE — 85610 PROTHROMBIN TIME: CPT

## 2020-08-24 PROCEDURE — 99212 OFFICE O/P EST SF 10 MIN: CPT | Performed by: NURSE PRACTITIONER

## 2020-08-24 NOTE — PROGRESS NOTES
Anticoagulation Summary  As of 8/24/2020    INR goal:  2.0-3.0   TTR:  --   INR used for dosing:  3.40 (8/24/2020)   Warfarin maintenance plan:  5 mg (5 mg x 1), then 10 mg (10 mg x 1) repeating every 2 days   Average weekly warfarin total:  52.5 mg   Plan last modified:  Silvia M Filter (8/21/2020)   Next INR check:  8/27/2020   Target end date:  11/3/2020    Indications    Lupus anticoagulant positive [R76.0]  Cerebrovascular accident (CVA) due to vascular occlusion (HCC) [I63.9]             Anticoagulation Episode Summary     INR check location:      Preferred lab:      Send INR reminders to:      Comments:        Anticoagulation Care Providers     Provider Role Specialty Phone number    Aayush Parra M.D. Referring Oncology [phone]    [facility] Responsible  [phone]    Julio Gibbons, PharmD Responsible          Anticoagulation Patient Findings      HPI:  Christ Calixto seen in clinic today for follow up on anticoagulation therapy in the presence of LA, CVA hx.   Denies any changes to current medical/health status since last appointment.   Denies any medication or diet changes.   No current symptoms of bleeding or thrombosis reported.  He got his doses mixed up and took more than directed.    A/P:   INR supratherapeutic.   Will decrease regimen.   BP recorded in vitals.    Follow up appointment on Thursday.    Next Appointment: Thursday, August 27, 2020 at 10:30 am.    China MARLOW

## 2020-08-27 ENCOUNTER — ANTICOAGULATION VISIT (OUTPATIENT)
Dept: VASCULAR LAB | Facility: MEDICAL CENTER | Age: 35
End: 2020-08-27
Attending: INTERNAL MEDICINE
Payer: MEDICAID

## 2020-08-27 DIAGNOSIS — I63.59 CEREBROVASCULAR ACCIDENT (CVA) DUE TO OCCLUSION OF OTHER CEREBRAL ARTERY (HCC): ICD-10-CM

## 2020-08-27 DIAGNOSIS — R76.0 LUPUS ANTICOAGULANT POSITIVE: ICD-10-CM

## 2020-08-27 LAB
INR BLD: 2.9 (ref 0.9–1.2)
INR PPP: 2.9 (ref 2–3.5)

## 2020-08-27 PROCEDURE — 99211 OFF/OP EST MAY X REQ PHY/QHP: CPT | Performed by: NURSE PRACTITIONER

## 2020-08-27 PROCEDURE — 85610 PROTHROMBIN TIME: CPT

## 2020-08-27 NOTE — PROGRESS NOTES
Anticoagulation Summary  As of 2020    INR goal:  2.0-3.0   TTR:  --   INR used for dosin.90 (2020)   Warfarin maintenance plan:  5 mg (5 mg x 1), then 10 mg (10 mg x 1) repeating every 2 days   Average weekly warfarin total:  52.5 mg   Plan last modified:  Silvia M Filter (2020)   Next INR check:  2020   Target end date:  11/3/2020    Indications    Lupus anticoagulant positive [R76.0]  Cerebrovascular accident (CVA) due to vascular occlusion (HCC) [I63.9]             Anticoagulation Episode Summary     INR check location:      Preferred lab:      Send INR reminders to:      Comments:        Anticoagulation Care Providers     Provider Role Specialty Phone number    Aayush Parra M.D. Referring Oncology [phone]    [facility] Responsible  [phone]    Julio Gibbons, PharmD Responsible          Anticoagulation Patient Findings      HPI:  Christ Calixto seen in clinic today for follow up on anticoagulation therapy in the presence of CVA, LA.   Denies any changes to current medical/health status since last appointment.   Denies any medication or diet changes.   No current symptoms of bleeding or thrombosis reported.    A/P:   INR therapeutic.   Continue current regimen.     Follow up appointment on Monday.    Next Appointment: Monday, 2020 at 10:45 am.    China MARLOW

## 2020-08-31 ENCOUNTER — ANTICOAGULATION VISIT (OUTPATIENT)
Dept: VASCULAR LAB | Facility: MEDICAL CENTER | Age: 35
End: 2020-08-31
Attending: INTERNAL MEDICINE
Payer: MEDICAID

## 2020-08-31 VITALS — HEART RATE: 97 BPM | SYSTOLIC BLOOD PRESSURE: 139 MMHG | DIASTOLIC BLOOD PRESSURE: 93 MMHG

## 2020-08-31 DIAGNOSIS — I63.59 CEREBROVASCULAR ACCIDENT (CVA) DUE TO OCCLUSION OF OTHER CEREBRAL ARTERY (HCC): ICD-10-CM

## 2020-08-31 DIAGNOSIS — R76.0 LUPUS ANTICOAGULANT POSITIVE: ICD-10-CM

## 2020-08-31 LAB — INR PPP: 3.6 (ref 2–3.5)

## 2020-08-31 PROCEDURE — 85610 PROTHROMBIN TIME: CPT

## 2020-08-31 PROCEDURE — 99212 OFFICE O/P EST SF 10 MIN: CPT | Performed by: NURSE PRACTITIONER

## 2020-08-31 NOTE — PROGRESS NOTES
Anticoagulation Summary  As of 8/31/2020    INR goal:  2.0-3.0   TTR:  0.0 % (2 d)   INR used for dosing:  3.60 (8/31/2020)   Warfarin maintenance plan:  10 mg (10 mg x 1) every Mon, Wed, Fri; 5 mg (5 mg x 1) all other days   Weekly warfarin total:  50 mg   Plan last modified:  China Nelson AChrisPISAURA (8/31/2020)   Next INR check:  9/3/2020   Target end date:  11/3/2020    Indications    Lupus anticoagulant positive [R76.0]  Cerebrovascular accident (CVA) due to vascular occlusion (HCC) [I63.9]             Anticoagulation Episode Summary     INR check location:      Preferred lab:      Send INR reminders to:      Comments:        Anticoagulation Care Providers     Provider Role Specialty Phone number    Aayush Parra M.D. Referring Oncology [phone]    [facility] Responsible  [phone]    Julio Gibbons, PharmD Responsible          Anticoagulation Patient Findings      HPI:  Christ Rosendo Calixto seen in clinic today for follow up on anticoagulation therapy in the presence of CVA, LA.   Reports having some tooth pain which has resolved somewhat. He thinks it's his wisdom tooth. He will discuss with is dentist.  Denies any medication or diet changes.   No current symptoms of bleeding or thrombosis reported.    A/P:   INR supratherapeutic.   Will decrease regimen.   BP recorded in vitals.    Follow up appointment on Thursday.    Next Appointment: Thursday, Sept 3, 2020 at 8:00 am.    China MARLOW

## 2020-09-01 LAB — INR BLD: 3.6 (ref 0.9–1.2)

## 2020-09-03 ENCOUNTER — ANTICOAGULATION VISIT (OUTPATIENT)
Dept: VASCULAR LAB | Facility: MEDICAL CENTER | Age: 35
End: 2020-09-03
Attending: INTERNAL MEDICINE
Payer: MEDICAID

## 2020-09-03 ENCOUNTER — OFFICE VISIT (OUTPATIENT)
Dept: MEDICAL GROUP | Facility: MEDICAL CENTER | Age: 35
End: 2020-09-03
Attending: NURSE PRACTITIONER
Payer: MEDICAID

## 2020-09-03 VITALS
WEIGHT: 315 LBS | OXYGEN SATURATION: 100 % | HEIGHT: 70 IN | DIASTOLIC BLOOD PRESSURE: 82 MMHG | HEART RATE: 96 BPM | BODY MASS INDEX: 45.1 KG/M2 | SYSTOLIC BLOOD PRESSURE: 122 MMHG | TEMPERATURE: 96.6 F

## 2020-09-03 DIAGNOSIS — R76.0 LUPUS ANTICOAGULANT POSITIVE: ICD-10-CM

## 2020-09-03 DIAGNOSIS — M54.42 ACUTE MIDLINE LOW BACK PAIN WITH LEFT-SIDED SCIATICA: ICD-10-CM

## 2020-09-03 LAB — INR PPP: 3 (ref 2–3.5)

## 2020-09-03 PROCEDURE — 99211 OFF/OP EST MAY X REQ PHY/QHP: CPT

## 2020-09-03 PROCEDURE — 99213 OFFICE O/P EST LOW 20 MIN: CPT | Performed by: NURSE PRACTITIONER

## 2020-09-03 PROCEDURE — 85610 PROTHROMBIN TIME: CPT

## 2020-09-03 RX ORDER — WARFARIN SODIUM 10 MG/1
TABLET ORAL
COMMUNITY
Start: 2020-08-17 | End: 2020-09-10 | Stop reason: SDUPTHER

## 2020-09-03 RX ORDER — WARFARIN SODIUM 5 MG/1
TABLET ORAL
COMMUNITY
Start: 2020-08-06 | End: 2020-09-10 | Stop reason: SDUPTHER

## 2020-09-03 ASSESSMENT — FIBROSIS 4 INDEX: FIB4 SCORE: 0.77

## 2020-09-03 ASSESSMENT — ENCOUNTER SYMPTOMS
BLOOD IN STOOL: 0
WEIGHT LOSS: 0
PALPITATIONS: 0
DIARRHEA: 0
COUGH: 0
SHORTNESS OF BREATH: 0
CHILLS: 0
WHEEZING: 0
ABDOMINAL PAIN: 0
FEVER: 0
CONSTIPATION: 0
TINGLING: 1
BACK PAIN: 1

## 2020-09-03 NOTE — ASSESSMENT & PLAN NOTE
Started about 2-3 week ago.  He pulled a muscle in his back while lifting boxes and then started to feel numbness along the lateral aspect of his left thigh down towards his left foot.  His back pain has improved with some stretching.  He denies any medication.  She denies saddle anesthesia and incontinence.

## 2020-09-03 NOTE — PROGRESS NOTES
Anticoagulation Summary  As of 9/3/2020    INR goal:  2.0-3.0   TTR:  0.0 % (5 d)   INR used for dosing:  3.00 (9/3/2020)   Warfarin maintenance plan:  10 mg (10 mg x 1) every Mon, Wed, Fri; 5 mg (5 mg x 1) all other days   Weekly warfarin total:  50 mg   Plan last modified:  SABINA Alvarez (8/31/2020)   Next INR check:  9/10/2020   Target end date:  11/3/2020    Indications    Lupus anticoagulant positive [R76.0]  Cerebrovascular accident (CVA) due to vascular occlusion (HCC) [I63.9]             Anticoagulation Episode Summary     INR check location:      Preferred lab:      Send INR reminders to:      Comments:        Anticoagulation Care Providers     Provider Role Specialty Phone number    Aayush Parra M.D. Referring Oncology [phone]    [facility] Responsible  [phone]    Julio Gibbons, PharmD Responsible          Anticoagulation Patient Findings      HPI:  Christ Calixto seen in clinic today, on anticoagulation therapy with warfarin for CVA.  Changes to current medical/health status since last appt: none  Denies signs/symptoms of bleeding and/or thrombosis since the last appt.    Denies any interval changes to diet  Denies any interval changes to medications since last appt.   Denies any complications or cost restrictions with current therapy.   Declines vitals.  Confirmed dosing regimen.     A/P   INR  therapeutic.   Begin new warfarin regimen based on the past 7 days dosing.     Follow up appointment in 1 week(s).    Branden Allison, PharmD

## 2020-09-03 NOTE — PROGRESS NOTES
Chief Complaint   Patient presents with   • Follow-Up       Subjective:     HPI:   Christ Calixto is a 35 y.o. male here to discuss the evaluation and management of:        Lupus anticoagulant positive  Established with vascular and Dr. Parra with heme/onc.  He is taking coumadin, his INR was therapeutic today..  They are watching his antibody levels.  He was told that his antibodies may resolve.      Acute midline low back pain with left-sided sciatica  Started about 2-3 week ago.  He pulled a muscle in his back while lifting boxes and then started to feel numbness along the lateral aspect of his left thigh down towards his left foot.  His back pain has improved with some stretching.  He denies any medication.  She denies saddle anesthesia and incontinence.      ROS  Review of Systems   Constitutional: Negative for chills, fever, malaise/fatigue and weight loss.   Respiratory: Negative for cough, shortness of breath and wheezing.    Cardiovascular: Negative for chest pain, palpitations and leg swelling.   Gastrointestinal: Negative for abdominal pain, blood in stool, constipation and diarrhea.   Musculoskeletal: Positive for back pain.   Neurological: Positive for tingling.         Not on File    Current medicines (including changes today)  Current Outpatient Medications   Medication Sig Dispense Refill   • warfarin (COUMADIN) 10 MG Tab      • warfarin (COUMADIN) 5 MG Tab      • aspirin (ASA) 81 MG Chew Tab chewable tablet Take 1 Tab by mouth every day. 100 Tab 5   • glyBURIDE-metFORMIN (GLUCOVANCE) 5-500 MG Tab Take 1 Tab by mouth every morning with breakfast. 30 Tab 3   • lisinopril-hydrochlorothiazide (PRINZIDE) 20-25 MG per tablet Take 1 Tab by mouth every day. 30 Tab 3   • atorvastatin (LIPITOR) 20 MG Tab Take 1 Tab by mouth every day. 30 Tab 3     No current facility-administered medications for this visit.        Social History     Tobacco Use   • Smoking status: Former Smoker     Packs/day: 0.50      "Years: 8.00     Pack years: 4.00     Types: Cigarettes     Quit date: 2020     Years since quittin.5   • Smokeless tobacco: Never Used   Substance Use Topics   • Alcohol use: No   • Drug use: Not Currently     Comment: smokes sarai        Patient Active Problem List    Diagnosis Date Noted   • HTN (hypertension) 07/10/2014     Priority: High   • JAYLIN (obstructive sleep apnea) 2020     Priority: Medium   • DM (diabetes mellitus) (HCC) 2020     Priority: Medium   • Morbid obesity (HCC) 07/10/2014     Priority: Low   • Acute midline low back pain with left-sided sciatica 2020   • Lupus anticoagulant positive 2020   • Encounter to establish care 2020   • Anxiety 2020   • Cerebrovascular accident (CVA) due to vascular occlusion (HCC) 2020   • Fatty liver 2014   • Smoking 07/10/2014       Family History   Problem Relation Age of Onset   • Diabetes Mother    • Diabetes Father    • Heart Disease Paternal Grandfather         MI          Objective:     /82 (BP Location: Right arm, Patient Position: Sitting, BP Cuff Size: Large adult)   Pulse 96   Temp 35.9 °C (96.6 °F) (Temporal)   Ht 1.778 m (5' 10\")   Wt (!) 179.7 kg (396 lb 3.2 oz)   SpO2 100%  Body mass index is 56.85 kg/m².    Physical Exam:  Physical Exam   Constitutional: He is oriented to person, place, and time and well-developed, well-nourished, and in no distress. No distress.   HENT:   Head: Normocephalic.   Right Ear: Tympanic membrane and external ear normal.   Left Ear: Tympanic membrane and external ear normal.   Eyes: Pupils are equal, round, and reactive to light. Conjunctivae and EOM are normal.   Neck: Normal range of motion. Neck supple. No tracheal deviation present.   Cardiovascular: Normal rate, regular rhythm, normal heart sounds and intact distal pulses.   Pulmonary/Chest: Effort normal and breath sounds normal.   Abdominal: Soft. Bowel sounds are normal.   Musculoskeletal:      " Lumbar back: He exhibits decreased range of motion and pain. He exhibits no tenderness.      Comments: Patient reports pain with straight leg lift on left side.   Lymphadenopathy:        Head (right side): No preauricular adenopathy present.        Head (left side): No preauricular adenopathy present.     He has no cervical adenopathy.   Neurological: He is alert and oriented to person, place, and time. He has normal sensation, normal strength and intact cranial nerves. Gait normal.   Skin: Skin is warm and dry.   Psychiatric: Affect and judgment normal.       Assessment and Plan:     The following treatment plan was discussed:    1. Lupus anticoagulant positive  REFERRAL TO RHEUMATOLOGY  -Chronic problem, stable.  Will refer him to rheumatology for further evaluation of his lupus anticoagulant coagulant positive status.  Continue plan of care per vascular and hematology/oncology.   2. Acute midline low back pain with left-sided sciatica  REFERRAL TO PHYSICAL THERAPY Reason for Therapy: Eval/Treat/Report  -New problem, unstable.  We discussed acetaminophen use, patient verbalized understanding.  We will also see if physical therapy is beneficial for his sciatica.       Any change or worsening of signs or symptoms, patient encouraged to follow-up or report to emergency room for further evaluation. Patient verbalizes understanding and agrees.    Follow-Up: Return in about 6 weeks (around 10/15/2020) for Routine follow up.      PLEASE NOTE: This dictation was created using voice recognition software. I have made every reasonable attempt to correct obvious errors, but I expect that there are errors of grammar and possibly content that I did not discover before finalizing the note.

## 2020-09-03 NOTE — ASSESSMENT & PLAN NOTE
Established with vascular and Dr. Parra with heme/onc.  He is taking coumadin, his INR was therapeutic today..  They are watching his antibody levels.  He was told that his antibodies may resolve.

## 2020-09-04 LAB — INR BLD: 3 (ref 0.9–1.2)

## 2020-09-10 ENCOUNTER — ANTICOAGULATION VISIT (OUTPATIENT)
Dept: VASCULAR LAB | Facility: MEDICAL CENTER | Age: 35
End: 2020-09-10
Attending: INTERNAL MEDICINE
Payer: MEDICAID

## 2020-09-10 DIAGNOSIS — R76.0 LUPUS ANTICOAGULANT POSITIVE: ICD-10-CM

## 2020-09-10 LAB
INR BLD: 3.2 (ref 0.9–1.2)
INR PPP: 3.2 (ref 2–3.5)

## 2020-09-10 PROCEDURE — 85610 PROTHROMBIN TIME: CPT

## 2020-09-10 PROCEDURE — 99212 OFFICE O/P EST SF 10 MIN: CPT

## 2020-09-10 RX ORDER — WARFARIN SODIUM 10 MG/1
10 TABLET ORAL DAILY
Qty: 90 TAB | Refills: 1 | Status: SHIPPED | OUTPATIENT
Start: 2020-09-10 | End: 2021-01-27

## 2020-09-10 RX ORDER — WARFARIN SODIUM 5 MG/1
5 TABLET ORAL DAILY
Qty: 90 TAB | Refills: 1 | Status: SHIPPED | OUTPATIENT
Start: 2020-09-10 | End: 2021-01-27

## 2020-09-10 NOTE — PROGRESS NOTES
Anticoagulation Summary  As of 9/10/2020    INR goal:  2.0-3.0   TTR:  0.0 % (1.7 wk)   INR used for dosing:  3.20 (9/10/2020)   Warfarin maintenance plan:  10 mg (10 mg x 1) every Mon, Wed; 5 mg (5 mg x 1) all other days   Weekly warfarin total:  45 mg   Plan last modified:  Eloisa Snyder, PharmD (9/10/2020)   Next INR check:  9/14/2020   Target end date:  11/3/2020    Indications    Lupus anticoagulant positive [R76.0]  Cerebrovascular accident (CVA) due to vascular occlusion (HCC) [I63.9]             Anticoagulation Episode Summary     INR check location:      Preferred lab:      Send INR reminders to:      Comments:        Anticoagulation Care Providers     Provider Role Specialty Phone number    Aayush Parra M.D. Referring Oncology [phone]    [facility] Responsible  [phone]    Julio Gibbons, PharmD Responsible                  Anticoagulation Patient Findings  Patient Findings     Negatives:  Signs/symptoms of thrombosis, Signs/symptoms of bleeding, Laboratory test error suspected, Change in health, Change in alcohol use, Change in activity, Upcoming invasive procedure, Emergency department visit, Upcoming dental procedure, Missed doses, Extra doses, Change in medications, Change in diet/appetite, Hospital admission, Bruising, Other complaints          HPI:  Christ Calixto seen in clinic today, on anticoagulation therapy with warfarin for lupus anticoagulant and CVA.  Changes to current medical/health status since last appt: No  Denies signs/symptoms of bleeding and/or thrombosis since the last appt.    Denies any interval changes to diet  Denies any interval changes to medications since last appt.   Denies any complications or cost restrictions with current therapy.   Verified current warfarin dosing schedule.   BP recorded in vitals. Declined by patient today.      A/P   INR  supra-therapeutic.   3.2  Patient is to decrease weekly regimen as detailed  above.  Follow up appointment in 5 days    Eloisa Snyder, PharmD   Stan Amanda, Pharmacy intern

## 2020-09-14 ENCOUNTER — ANTICOAGULATION VISIT (OUTPATIENT)
Dept: VASCULAR LAB | Facility: MEDICAL CENTER | Age: 35
End: 2020-09-14
Attending: INTERNAL MEDICINE
Payer: MEDICAID

## 2020-09-14 DIAGNOSIS — R76.0 LUPUS ANTICOAGULANT POSITIVE: ICD-10-CM

## 2020-09-14 DIAGNOSIS — I63.529 CEREBROVASCULAR ACCIDENT (CVA) DUE TO OCCLUSION OF ANTERIOR CEREBRAL ARTERY, UNSPECIFIED BLOOD VESSEL LATERALITY (HCC): ICD-10-CM

## 2020-09-14 LAB
INR BLD: 2.7 (ref 0.9–1.2)
INR PPP: 2.7 (ref 2–3.5)

## 2020-09-14 PROCEDURE — 85610 PROTHROMBIN TIME: CPT

## 2020-09-14 PROCEDURE — 99211 OFF/OP EST MAY X REQ PHY/QHP: CPT | Performed by: NURSE PRACTITIONER

## 2020-09-14 NOTE — PROGRESS NOTES
Anticoagulation Summary  As of 2020    INR goal:  2.0-3.0   TTR:  15.0 % (2.3 wk)   INR used for dosin.70 (2020)   Warfarin maintenance plan:  10 mg (10 mg x 1) every Mon, Wed; 5 mg (5 mg x 1) all other days   Weekly warfarin total:  45 mg   Plan last modified:  Eloisa Snyder, PharmD (9/10/2020)   Next INR check:  2020   Target end date:  11/3/2020    Indications    Lupus anticoagulant positive [R76.0]  Cerebrovascular accident (CVA) due to vascular occlusion (HCC) [I63.9]             Anticoagulation Episode Summary     INR check location:      Preferred lab:      Send INR reminders to:      Comments:        Anticoagulation Care Providers     Provider Role Specialty Phone number    Aayush Parra M.D. Referring Oncology [phone]    [facility] Responsible  [phone]    Julio Gibbons, PharmD Responsible          Anticoagulation Patient Findings      HPI:  Christ Calixto seen in clinic today for follow up on anticoagulation therapy in the presence of lupus anticoagulant, CVA hx.   Denies any changes to current medical/health status since last appointment.   Denies any medication or diet changes.   No current symptoms of bleeding or thrombosis reported.    A/P:   INR therapeutic.   Continue current regimen.     Follow up appointment in 1 week(s).    Next Appointment: Monday, 2020 at 10:30 am.    [place] MARLOW

## 2020-09-21 ENCOUNTER — ANTICOAGULATION VISIT (OUTPATIENT)
Dept: VASCULAR LAB | Facility: MEDICAL CENTER | Age: 35
End: 2020-09-21
Attending: INTERNAL MEDICINE
Payer: MEDICAID

## 2020-09-21 DIAGNOSIS — I63.529 CEREBROVASCULAR ACCIDENT (CVA) DUE TO OCCLUSION OF ANTERIOR CEREBRAL ARTERY, UNSPECIFIED BLOOD VESSEL LATERALITY (HCC): ICD-10-CM

## 2020-09-21 DIAGNOSIS — R76.0 LUPUS ANTICOAGULANT POSITIVE: ICD-10-CM

## 2020-09-21 LAB — INR PPP: 2.5 (ref 2–3.5)

## 2020-09-21 PROCEDURE — 99211 OFF/OP EST MAY X REQ PHY/QHP: CPT

## 2020-09-21 PROCEDURE — 85610 PROTHROMBIN TIME: CPT

## 2020-09-21 NOTE — PROGRESS NOTES
Anticoagulation Summary  As of 2020    INR goal:  2.0-3.0   TTR:  40.9 % (3.3 wk)   INR used for dosin.50 (2020)   Warfarin maintenance plan:  10 mg (10 mg x 1) every Mon, Wed; 5 mg (5 mg x 1) all other days   Weekly warfarin total:  45 mg   Plan last modified:  Eloisa Snyder, PharmD (9/10/2020)   Next INR check:  10/5/2020   Target end date:  11/3/2020    Indications    Lupus anticoagulant positive [R76.0]  Cerebrovascular accident (CVA) due to vascular occlusion (HCC) [I63.9]             Anticoagulation Episode Summary     INR check location:      Preferred lab:      Send INR reminders to:      Comments:        Anticoagulation Care Providers     Provider Role Specialty Phone number    Aayush Parra M.D. Referring Oncology [phone]    [facility] Responsible  [phone]    Julio Gibbons, PharmD Responsible                  Anticoagulation Patient Findings      HPI:  Christ Calixto seen in clinic today, on anticoagulation therapy with warfarin for lupus anticoagulant positive  Changes to current medical/health status since last appt: no  Denies signs/symptoms of bleeding and/or thrombosis since the last appt.    Denies any interval changes to diet  Denies any interval changes to medications since last appt.   Denies any complications or cost restrictions with current therapy.   Verified current warfarin dosing schedule.   Pt declined vitals      A/P   INR  remains therapeutic.   Continue current regimen.    Follow up appointment in 2 week(s).    Jannet Rome, PharmD

## 2020-09-22 LAB — INR BLD: 2.5 (ref 0.9–1.2)

## 2020-10-05 ENCOUNTER — ANTICOAGULATION VISIT (OUTPATIENT)
Dept: VASCULAR LAB | Facility: MEDICAL CENTER | Age: 35
End: 2020-10-05
Attending: INTERNAL MEDICINE
Payer: MEDICAID

## 2020-10-05 DIAGNOSIS — I63.529 CEREBROVASCULAR ACCIDENT (CVA) DUE TO OCCLUSION OF ANTERIOR CEREBRAL ARTERY, UNSPECIFIED BLOOD VESSEL LATERALITY (HCC): ICD-10-CM

## 2020-10-05 DIAGNOSIS — R76.0 LUPUS ANTICOAGULANT POSITIVE: ICD-10-CM

## 2020-10-05 LAB
INR BLD: 2.4 (ref 0.9–1.2)
INR PPP: 2.4 (ref 2–3.5)

## 2020-10-05 PROCEDURE — 99211 OFF/OP EST MAY X REQ PHY/QHP: CPT | Performed by: NURSE PRACTITIONER

## 2020-10-05 PROCEDURE — 85610 PROTHROMBIN TIME: CPT

## 2020-10-19 ENCOUNTER — ANTICOAGULATION VISIT (OUTPATIENT)
Dept: VASCULAR LAB | Facility: MEDICAL CENTER | Age: 35
End: 2020-10-19
Attending: INTERNAL MEDICINE
Payer: MEDICAID

## 2020-10-19 VITALS — SYSTOLIC BLOOD PRESSURE: 140 MMHG | HEART RATE: 96 BPM | DIASTOLIC BLOOD PRESSURE: 92 MMHG

## 2020-10-19 DIAGNOSIS — I63.529 CEREBROVASCULAR ACCIDENT (CVA) DUE TO OCCLUSION OF ANTERIOR CEREBRAL ARTERY, UNSPECIFIED BLOOD VESSEL LATERALITY (HCC): ICD-10-CM

## 2020-10-19 DIAGNOSIS — R76.0 LUPUS ANTICOAGULANT POSITIVE: ICD-10-CM

## 2020-10-19 LAB
INR BLD: 2.3 (ref 0.9–1.2)
INR PPP: 2.3 (ref 2–3.5)

## 2020-10-19 PROCEDURE — 99211 OFF/OP EST MAY X REQ PHY/QHP: CPT | Performed by: NURSE PRACTITIONER

## 2020-10-19 PROCEDURE — 85610 PROTHROMBIN TIME: CPT

## 2020-10-19 NOTE — PROGRESS NOTES
Anticoagulation Summary  As of 10/19/2020    INR goal:  2.0-3.0   TTR:  73.3 % (1.7 mo)   INR used for dosin.30 (10/19/2020)   Warfarin maintenance plan:  10 mg (10 mg x 1) every Mon, Wed; 5 mg (5 mg x 1) all other days   Weekly warfarin total:  45 mg   Plan last modified:  Eloisa Snyder, PharmD (9/10/2020)   Next INR check:  2020   Target end date:  11/3/2020    Indications    Lupus anticoagulant positive [R76.0]  Cerebrovascular accident (CVA) due to vascular occlusion (HCC) [I63.9]             Anticoagulation Episode Summary     INR check location:      Preferred lab:      Send INR reminders to:      Comments:        Anticoagulation Care Providers     Provider Role Specialty Phone number    Aayush Parra M.D. Referring Oncology [phone]    [facility] Responsible  [phone]    Julio Gibbons, PharmD Responsible          Anticoagulation Patient Findings      HPI:  Christ Calixto seen in clinic today for follow up on anticoagulation therapy in the presence of CVA, lupus anticoagulant.   Denies any changes to current medical/health status since last appointment.   Denies any medication or diet changes.   No current symptoms of bleeding or thrombosis reported.    A/P:   INR therapeutic.   Continue current regimen.   BP recorded in vitals.    Follow up appointment in 2 week(s).    Next Appointment: Monday, 2020 at 10:45 am.    [place] MARLOW

## 2020-10-27 ENCOUNTER — NURSE TRIAGE (OUTPATIENT)
Dept: HEALTH INFORMATION MANAGEMENT | Facility: OTHER | Age: 35
End: 2020-10-27

## 2020-10-27 NOTE — TELEPHONE ENCOUNTER
Reason for Disposition  • Minor puncture wound    Additional Information  • Negative: Shock suspected (e.g., cold/pale/clammy skin, too weak to stand, low BP, rapid pulse)  • Negative: Puncture wound of head, neck, chest, back, or abdomen that sounds life-threatening to triager  • Negative: Sounds like a life-threatening emergency to the triager  • Negative: Caused by an animal bite  • Negative: Skin is cut or scraped, not punctured  • Negative: Puncture wound of eye or eyelid  • Negative: Foreign body is still in the skin (e.g., splinter, sliver, fishhook)  • Negative: Puncture wound of head, neck, chest, abdomen, or overlying a joint and it could be deep  • Negative: Needlestick from used or discarded injection needle (EXCEPTION: clean, unused needle)  • Negative: High pressure injection injury (e.g., from grease gun or paint gun, usually work-related)  • Negative: Sounds like a serious injury to the triager  • Negative: SEVERE pain (e.g., excruciating)  • Negative: Puncture wound of foot and hurts too much to walk on (i.e., unable to bear weight, severe limp)  • Negative: Puncture wound of bare foot (no shoes) and setting was dirty  • Negative: Puncture wound of finger and entire finger swollen  • Negative: Puncture wound from sharp object that was very dirty (e.g., barnyard)  • Negative: Dirt (debris) can be seen in the wound, not removed with 15 minute scrubbing  • Negative: Tip of the object is broken off and missing  • Negative: Sensation of something still in the wound  • Negative: Looks infected (e.g., spreading redness, pus, red streak)  • Negative: Pain or swelling present > 5 days  • Negative: No prior tetanus shots (or is not fully vaccinated) and any wound (e.g., cut or scrape)  • Negative: Last tetanus shot > 5 years ago  • Negative: Patient wants to be seen  • Negative: After 14 days and wound isn't healed  • Negative: Puncture wound on foot and patient has diabetes mellitus  • Negative: Puncture  "through shoe (e.g., tennis shoe) and into bottom of foot    Answer Assessment - Initial Assessment Questions  1. LOCATION: \"Where is the puncture located?\"       In grown hair  2. OBJECT: \"What was the object that punctured the skin?\"       Ingrown hair  3. DEPTH: \"How deep do you think the puncture goes?\"       lanced  4. ONSET: \"When did the injury occur?\" (Minutes or hours)      2 days ago  5. PAIN: \"Is it painful?\" If so, ask: \"How bad is the pain?\"  (Scale 1-10; or mild, moderate, severe)      no  6. TETANUS: \"When was the last tetanus booster?\"      UTD  7. PREGNANCY: \"Is there any chance you are pregnant?\" \"When was your last menstrual period?\"      no    Protocols used: PUNCTURE WOUND-A-OH      "

## 2020-11-02 ENCOUNTER — ANTICOAGULATION VISIT (OUTPATIENT)
Dept: VASCULAR LAB | Facility: MEDICAL CENTER | Age: 35
End: 2020-11-02
Attending: INTERNAL MEDICINE
Payer: MEDICAID

## 2020-11-02 DIAGNOSIS — R76.0 LUPUS ANTICOAGULANT POSITIVE: ICD-10-CM

## 2020-11-02 DIAGNOSIS — I63.529 CEREBROVASCULAR ACCIDENT (CVA) DUE TO OCCLUSION OF ANTERIOR CEREBRAL ARTERY, UNSPECIFIED BLOOD VESSEL LATERALITY (HCC): ICD-10-CM

## 2020-11-02 LAB — INR PPP: 2.3 (ref 2–3.5)

## 2020-11-02 PROCEDURE — 99211 OFF/OP EST MAY X REQ PHY/QHP: CPT

## 2020-11-02 PROCEDURE — 85610 PROTHROMBIN TIME: CPT

## 2020-11-02 NOTE — Clinical Note
This pt may be discharged from our clinic depending on how his appointment tomorrow goes, as an FYI.

## 2020-11-03 ENCOUNTER — HOSPITAL ENCOUNTER (OUTPATIENT)
Facility: MEDICAL CENTER | Age: 35
End: 2020-11-03
Attending: INTERNAL MEDICINE
Payer: MEDICAID

## 2020-11-03 PROCEDURE — 83615 LACTATE (LD) (LDH) ENZYME: CPT

## 2020-11-03 PROCEDURE — 86146 BETA-2 GLYCOPROTEIN ANTIBODY: CPT

## 2020-11-03 PROCEDURE — 80053 COMPREHEN METABOLIC PANEL: CPT

## 2020-11-03 PROCEDURE — 86235 NUCLEAR ANTIGEN ANTIBODY: CPT

## 2020-11-03 PROCEDURE — 86038 ANTINUCLEAR ANTIBODIES: CPT

## 2020-11-03 PROCEDURE — 85025 COMPLETE CBC W/AUTO DIFF WBC: CPT

## 2020-11-03 PROCEDURE — 86225 DNA ANTIBODY NATIVE: CPT

## 2020-11-03 PROCEDURE — 86256 FLUORESCENT ANTIBODY TITER: CPT

## 2020-11-03 PROCEDURE — 86039 ANTINUCLEAR ANTIBODIES (ANA): CPT

## 2020-11-04 ENCOUNTER — HOSPITAL ENCOUNTER (OUTPATIENT)
Dept: LAB | Facility: MEDICAL CENTER | Age: 35
End: 2020-11-04
Attending: INTERNAL MEDICINE
Payer: MEDICAID

## 2020-11-04 LAB
ALBUMIN SERPL BCP-MCNC: 3.8 G/DL (ref 3.2–4.9)
ALBUMIN/GLOB SERPL: 1 G/DL
ALP SERPL-CCNC: 67 U/L (ref 30–99)
ALT SERPL-CCNC: 22 U/L (ref 2–50)
ANION GAP SERPL CALC-SCNC: 12 MMOL/L (ref 7–16)
AST SERPL-CCNC: 22 U/L (ref 12–45)
BASOPHILS # BLD AUTO: 0.7 % (ref 0–1.8)
BASOPHILS # BLD: 0.05 K/UL (ref 0–0.12)
BILIRUB SERPL-MCNC: 1.1 MG/DL (ref 0.1–1.5)
BUN SERPL-MCNC: 20 MG/DL (ref 8–22)
CALCIUM SERPL-MCNC: 8.9 MG/DL (ref 8.5–10.5)
CHLORIDE SERPL-SCNC: 98 MMOL/L (ref 96–112)
CO2 SERPL-SCNC: 24 MMOL/L (ref 20–33)
CREAT SERPL-MCNC: 1.24 MG/DL (ref 0.5–1.4)
EOSINOPHIL # BLD AUTO: 0.07 K/UL (ref 0–0.51)
EOSINOPHIL NFR BLD: 1 % (ref 0–6.9)
ERYTHROCYTE [DISTWIDTH] IN BLOOD BY AUTOMATED COUNT: 42.6 FL (ref 35.9–50)
GLOBULIN SER CALC-MCNC: 3.7 G/DL (ref 1.9–3.5)
GLUCOSE SERPL-MCNC: 127 MG/DL (ref 65–99)
HCT VFR BLD AUTO: 49.7 % (ref 42–52)
HGB BLD-MCNC: 15.7 G/DL (ref 14–18)
IMM GRANULOCYTES # BLD AUTO: 0.05 K/UL (ref 0–0.11)
IMM GRANULOCYTES NFR BLD AUTO: 0.7 % (ref 0–0.9)
LDH SERPL L TO P-CCNC: 222 U/L (ref 107–266)
LYMPHOCYTES # BLD AUTO: 1.36 K/UL (ref 1–4.8)
LYMPHOCYTES NFR BLD: 19.4 % (ref 22–41)
MCH RBC QN AUTO: 28.1 PG (ref 27–33)
MCHC RBC AUTO-ENTMCNC: 31.6 G/DL (ref 33.7–35.3)
MCV RBC AUTO: 89.1 FL (ref 81.4–97.8)
MONOCYTES # BLD AUTO: 0.4 K/UL (ref 0–0.85)
MONOCYTES NFR BLD AUTO: 5.7 % (ref 0–13.4)
NEUTROPHILS # BLD AUTO: 5.08 K/UL (ref 1.82–7.42)
NEUTROPHILS NFR BLD: 72.5 % (ref 44–72)
NRBC # BLD AUTO: 0 K/UL
NRBC BLD-RTO: 0 /100 WBC
PLATELET # BLD AUTO: 211 K/UL (ref 164–446)
PMV BLD AUTO: 10.4 FL (ref 9–12.9)
POTASSIUM SERPL-SCNC: 3.9 MMOL/L (ref 3.6–5.5)
PROT SERPL-MCNC: 7.5 G/DL (ref 6–8.2)
RBC # BLD AUTO: 5.58 M/UL (ref 4.7–6.1)
SODIUM SERPL-SCNC: 134 MMOL/L (ref 135–145)
WBC # BLD AUTO: 7 K/UL (ref 4.8–10.8)

## 2020-11-04 PROCEDURE — 85610 PROTHROMBIN TIME: CPT

## 2020-11-04 PROCEDURE — 85732 THROMBOPLASTIN TIME PARTIAL: CPT

## 2020-11-04 PROCEDURE — 85613 RUSSELL VIPER VENOM DILUTED: CPT

## 2020-11-04 PROCEDURE — 85670 THROMBIN TIME PLASMA: CPT

## 2020-11-04 PROCEDURE — 80500 HCHG CLINICAL PATH CONSULT-LIMITED: CPT | Mod: XU

## 2020-11-04 PROCEDURE — 85520 HEPARIN ASSAY: CPT

## 2020-11-04 PROCEDURE — 85730 THROMBOPLASTIN TIME PARTIAL: CPT

## 2020-11-04 PROCEDURE — 36415 COLL VENOUS BLD VENIPUNCTURE: CPT

## 2020-11-04 PROCEDURE — 86147 CARDIOLIPIN ANTIBODY EA IG: CPT | Mod: 91

## 2020-11-05 LAB
INR BLD: 2.3 (ref 0.9–1.2)
NUCLEAR IGG SER QL IA: DETECTED

## 2020-11-06 LAB
ANA INTERPRETIVE COMMENT Q5143: ABNORMAL
ANA PATTERN Q5144: ABNORMAL
ANA TITER Q5145: ABNORMAL
ANTINUCLEAR ANTIBODY (ANA), HEP-2, IGG Q5142: DETECTED
B2 GLYCOPROT1 IGA SER-ACNC: 5 SAU (ref 0–20)
B2 GLYCOPROT1 IGG SERPL IA-ACNC: 16 SGU (ref 0–20)
B2 GLYCOPROT1 IGM SERPL IA-ACNC: 1 SMU (ref 0–20)
CARDIOLIPIN IGA SER IA-ACNC: 3 APL (ref 0–11)
CARDIOLIPIN IGG SER IA-ACNC: 24 GPL (ref 0–14)
CARDIOLIPIN IGM SER IA-ACNC: 13 MPL (ref 0–12)

## 2020-11-07 LAB — DSDNA AB TITR SER CLIF: DETECTED {TITER}

## 2020-11-08 LAB
APTT 1H NP PPP: 40 SEC (ref 24.7–36)
APTT 1H P INC POOL PPP: 32.1 SEC (ref 24.7–36)
APTT 1H P INC PPP: 44.3 SEC (ref 24.7–36)
APTT IMM NP PPP: 38.8 SEC (ref 24.7–36)
APTT POOL PPP: 41.1 SEC (ref 24.7–36)
APTT PPP: 42.2 SEC (ref 24.7–36)
APTT PPP: 42.2 SEC (ref 24.7–36)
DRVVT MIX 37 DRVMX37: 62.8 SEC (ref 28–48)
DRVVT MIX IMMEDIATE DRVMXI: 61.8 SEC (ref 28–48)
ENA JO1 AB TITR SER: 7 AU/ML (ref 0–40)
ENA SCL70 IGG SER QL: 2 AU/ML (ref 0–40)
ENA SM IGG SER-ACNC: 1 AU/ML (ref 0–40)
ENA SS-B IGG SER IA-ACNC: 17 AU/ML (ref 0–40)
INR PPP: 1.4 (ref 0.87–1.13)
LA PPP-IMP: POSITIVE
LA PPP-IMP: PRESENT
PATH REV: NORMAL
PATH REV: NORMAL
PROTHROMBIN TIME: 17.6 SEC (ref 12–14.6)
SCREEN DRVVT: 139.2 SEC (ref 28–48)
SSA52 R0ENA AB IGG Q0420: 123 AU/ML (ref 0–40)
SSA60 R0ENA AB IGG Q0419: 133 AU/ML (ref 0–40)
THROMBIN TIME: 15.9 SEC
U1 SNRNP IGG SER QL: 7 AU/ML (ref 0–40)
UFH PPP CHRO-ACNC: <0.1 U/ML

## 2020-11-08 PROCEDURE — 80500 HCHG CLINICAL PATH CONSULT-LIMITED: CPT

## 2020-11-09 LAB — DSDNA IGG TITR SER CLIF: ABNORMAL {TITER}

## 2020-11-16 ENCOUNTER — ANTICOAGULATION VISIT (OUTPATIENT)
Dept: VASCULAR LAB | Facility: MEDICAL CENTER | Age: 35
End: 2020-11-16
Attending: INTERNAL MEDICINE
Payer: MEDICAID

## 2020-11-16 DIAGNOSIS — I63.529 CEREBROVASCULAR ACCIDENT (CVA) DUE TO OCCLUSION OF ANTERIOR CEREBRAL ARTERY, UNSPECIFIED BLOOD VESSEL LATERALITY (HCC): ICD-10-CM

## 2020-11-16 DIAGNOSIS — R76.0 LUPUS ANTICOAGULANT POSITIVE: ICD-10-CM

## 2020-11-16 LAB
INR BLD: 1.9 (ref 0.9–1.2)
INR PPP: 1.9 (ref 2–3.5)

## 2020-11-16 PROCEDURE — 99211 OFF/OP EST MAY X REQ PHY/QHP: CPT

## 2020-11-16 PROCEDURE — 85610 PROTHROMBIN TIME: CPT

## 2020-11-16 NOTE — PROGRESS NOTES
Anticoagulation Summary  As of 2020    INR goal:  2.0-3.0   TTR:  66.1 % (2.6 mo)   INR used for dosin.90 (2020)   Warfarin maintenance plan:  10 mg (10 mg x 1) every Mon, Wed; 5 mg (5 mg x 1) all other days   Weekly warfarin total:  45 mg   Plan last modified:  Eloisa Snyder PharmD (9/10/2020)   Next INR check:  2020   Target end date:  11/3/2020    Indications    Lupus anticoagulant positive [R76.0]  Cerebrovascular accident (CVA) due to vascular occlusion (HCC) [I63.9]             Anticoagulation Episode Summary     INR check location:      Preferred lab:      Send INR reminders to:      Comments:        Anticoagulation Care Providers     Provider Role Specialty Phone number    Aayush Parra M.D. Referring Oncology [phone]    [facility] Responsible  [phone]    Armando MarshallD Responsible                  Anticoagulation Patient Findings      HPI:  Christ Calixto seen in clinic today, on anticoagulation therapy with warfarin for lupus anticoagulant positive, CVA.  Changes to current medical/health status since last appt: No  Denies signs/symptoms of bleeding and/or thrombosis since the last appt.    Denies any interval changes to diet  Denies any interval changes to medications since last appt.   Denies any complications or cost restrictions with current therapy.   Verified current warfarin dosing schedule.   Pt declined vitals  Medications reconciled      A/P   INR  is now slightly sub-therapeutic.   Patient has been stable on this regimen for a while and INR is only slightly out of range. Will continue current regimen.    Follow up appointment in 2 week(s).    Jannet Rome, PharmD

## 2020-11-30 ENCOUNTER — ANTICOAGULATION VISIT (OUTPATIENT)
Dept: VASCULAR LAB | Facility: MEDICAL CENTER | Age: 35
End: 2020-11-30
Attending: INTERNAL MEDICINE
Payer: MEDICAID

## 2020-11-30 DIAGNOSIS — R76.0 LUPUS ANTICOAGULANT POSITIVE: ICD-10-CM

## 2020-11-30 DIAGNOSIS — I63.529 CEREBROVASCULAR ACCIDENT (CVA) DUE TO OCCLUSION OF ANTERIOR CEREBRAL ARTERY, UNSPECIFIED BLOOD VESSEL LATERALITY (HCC): ICD-10-CM

## 2020-11-30 LAB — INR PPP: 3.6 (ref 2–3.5)

## 2020-11-30 PROCEDURE — 99212 OFFICE O/P EST SF 10 MIN: CPT

## 2020-11-30 PROCEDURE — 85610 PROTHROMBIN TIME: CPT

## 2020-11-30 NOTE — PROGRESS NOTES
Anticoagulation Summary  As of 11/30/2020    INR goal:  2.0-3.0   TTR:  65.0 % (3.1 mo)   INR used for dosing:  3.60 (11/30/2020)   Warfarin maintenance plan:  10 mg (10 mg x 1) every Mon, Wed; 5 mg (5 mg x 1) all other days   Weekly warfarin total:  45 mg   Plan last modified:  Eloisa Snyder, PharmD (9/10/2020)   Next INR check:  12/14/2020   Target end date:  11/3/2020    Indications    Lupus anticoagulant positive [R76.0]  Cerebrovascular accident (CVA) due to vascular occlusion (HCC) [I63.9]             Anticoagulation Episode Summary     INR check location:      Preferred lab:      Send INR reminders to:      Comments:        Anticoagulation Care Providers     Provider Role Specialty Phone number    Aayush Parra M.D. Referring Oncology [phone]    [facility] Responsible  [phone]    Julio Gibbons, PharmD Responsible                  Anticoagulation Patient Findings      HPI:  Christ Calixto seen in clinic today, on anticoagulation therapy with warfarin for lupus anticoagulant positive  Changes to current medical/health status since last appt: No  Denies signs/symptoms of bleeding and/or thrombosis since the last appt.    Patient says that he is struggling with being consistent with greens intake. Advised patient that he try to keep his greens intake generally the same from week to week. Patient says he will try to eat greens about once weekly.  Denies any interval changes to medications since last appt.   Denies any complications or cost restrictions with current therapy.   Verified current warfarin dosing schedule.   Pt declined vitals.  Medications reconciled      A/P   INR  is now supra-therapeutic.   Will give decreased dose of 5mg today then resume current regimen. Patient was slightly subtherapeutic at last INR check so will not make any major changes. Believe that INR fluctuation is likely due to inconsistent intake of greens.    Follow up appointment in 2  week(s).    Jannet Rome, PharmD

## 2020-12-01 LAB — INR BLD: 3.6 (ref 0.9–1.2)

## 2020-12-14 ENCOUNTER — ANTICOAGULATION VISIT (OUTPATIENT)
Dept: VASCULAR LAB | Facility: MEDICAL CENTER | Age: 35
End: 2020-12-14
Attending: INTERNAL MEDICINE
Payer: MEDICAID

## 2020-12-14 DIAGNOSIS — R76.0 LUPUS ANTICOAGULANT POSITIVE: ICD-10-CM

## 2020-12-14 DIAGNOSIS — I63.529 CEREBROVASCULAR ACCIDENT (CVA) DUE TO OCCLUSION OF ANTERIOR CEREBRAL ARTERY, UNSPECIFIED BLOOD VESSEL LATERALITY (HCC): ICD-10-CM

## 2020-12-14 LAB
INR BLD: 2.1 (ref 0.9–1.2)
INR PPP: 2.1 (ref 2–3.5)

## 2020-12-14 PROCEDURE — 85610 PROTHROMBIN TIME: CPT

## 2020-12-14 PROCEDURE — 99211 OFF/OP EST MAY X REQ PHY/QHP: CPT

## 2020-12-14 NOTE — PROGRESS NOTES
Anticoagulation Summary  As of 2020    INR goal:  2.0-3.0   TTR:  64.4 % (3.6 mo)   INR used for dosin.10 (2020)   Warfarin maintenance plan:  10 mg (10 mg x 1) every Mon, Wed; 5 mg (5 mg x 1) all other days   Weekly warfarin total:  45 mg   Plan last modified:  Eloisa Snyder, Alex (9/10/2020)   Next INR check:  2020   Target end date:  11/3/2020    Indications    Lupus anticoagulant positive [R76.0]  Cerebrovascular accident (CVA) due to vascular occlusion (HCC) [I63.9]             Anticoagulation Episode Summary     INR check location:      Preferred lab:      Send INR reminders to:      Comments:        Anticoagulation Care Providers     Provider Role Specialty Phone number    Aayush Parra M.D. Referring Oncology [phone]    [facility] Responsible  [phone]    Julio Gibbons, PharmD Responsible                  Anticoagulation Patient Findings      HPI:  Christ Calixto seen in clinic today, on anticoagulation therapy with warfarin for lupus anticoagulant positive  Changes to current medical/health status since last appt: No  Denies signs/symptoms of bleeding and/or thrombosis since the last appt.    Denies any interval changes to diet. Patient says that he has been staying more consistent with greens intake, eats greens about once weekly.  Denies any interval changes to medications since last appt.   Denies any complications or cost restrictions with current therapy.   Verified current warfarin dosing schedule.   Pt declined vitals  Medications reconciled  Pt on antiplatelet therapy Aspirin 81mg, must be reviewed again on next visit.      A/P   INR  is now therapeutic.   Continue current regimen.    Follow up appointment in 3 week(s).    Jannet Rome, PharmD

## 2020-12-28 ENCOUNTER — ANTICOAGULATION VISIT (OUTPATIENT)
Dept: VASCULAR LAB | Facility: MEDICAL CENTER | Age: 35
End: 2020-12-28
Attending: INTERNAL MEDICINE
Payer: MEDICAID

## 2020-12-28 DIAGNOSIS — R76.0 LUPUS ANTICOAGULANT POSITIVE: ICD-10-CM

## 2020-12-28 LAB
INR BLD: 2.4 (ref 0.9–1.2)
INR PPP: 2.4 (ref 2–3.5)

## 2020-12-28 PROCEDURE — 85610 PROTHROMBIN TIME: CPT

## 2020-12-28 NOTE — PROGRESS NOTES
Anticoagulation Summary  As of 12/28/2020    INR goal:  2.0-3.0   TTR:  64.4 % (3.6 mo)   INR used for dosing:     Warfarin maintenance plan:  10 mg (10 mg x 1) every Mon, Wed; 5 mg (5 mg x 1) all other days   Weekly warfarin total:  45 mg   Plan last modified:  Amrando AuD (9/10/2020)   Next INR check:     Target end date:  11/3/2020    Indications    Lupus anticoagulant positive [R76.0]  Cerebrovascular accident (CVA) due to vascular occlusion (HCC) [I63.9]             Anticoagulation Episode Summary     INR check location:      Preferred lab:      Send INR reminders to:      Comments:        Anticoagulation Care Providers     Provider Role Specialty Phone number    Aayush Parra M.D. Referring Oncology [phone]    [facility] Responsible  [phone]    Julio Gibbons, PharmD Responsible                  Anticoagulation Patient Findings      HPI:  Christ Calixto seen in clinic today, on anticoagulation therapy with warfarin for lupus anticoagulant positive  Changes to current medical/health status since last appt: DENIES  Denies signs/symptoms of bleeding and/or thrombosis since the last appt.    Denies any interval changes to diet  Denies any interval changes to medications since last appt.   Denies any complications or cost restrictions with current therapy.   Verified current warfarin dosing schedule.   Pt declines vitals due to Covid transmission concerns.   Medications reconciled   Pt on antiplatelet therapy Aspirin 81mg and must be reviewed again at 12/28/2021. Discussed bleeding risk associated with concurrent ASA use.        A/P   INR  therapeutic at 2.4  Pt is to continue with current warfarin dosing regimen.    Pt will be following up with provider about extending length of therapy and instructed pt to inform clinic if stopping warfarin to discharge from clinic.      Follow up appointment in 3 week(s).    Shawn Watson, ArmandoD

## 2021-01-18 ENCOUNTER — APPOINTMENT (OUTPATIENT)
Dept: VASCULAR LAB | Facility: MEDICAL CENTER | Age: 36
End: 2021-01-18
Attending: INTERNAL MEDICINE

## 2021-01-25 ENCOUNTER — TELEPHONE (OUTPATIENT)
Dept: VASCULAR LAB | Facility: MEDICAL CENTER | Age: 36
End: 2021-01-25

## 2021-01-25 DIAGNOSIS — R76.0 LUPUS ANTICOAGULANT POSITIVE: ICD-10-CM

## 2021-01-25 DIAGNOSIS — I63.529 CEREBROVASCULAR ACCIDENT (CVA) DUE TO OCCLUSION OF ANTERIOR CEREBRAL ARTERY, UNSPECIFIED BLOOD VESSEL LATERALITY (HCC): ICD-10-CM

## 2021-01-25 NOTE — TELEPHONE ENCOUNTER
Received 1/25/21 visit note from heme/onc Dr. Parra (will be scanned in media tab).    Per Dr. Parra, pt will be transitioning from warfarin to Lovenox 1 mg/kg BID for one month. After one month, pt to get lupus anticoagulant lab draw.     Pt will f/u with Dr. Parra in 6 weeks (around 3/8).    LVM with pt to discuss if he was able to transition to Lovenox.    Eloisa Snyder, ArmandoD

## 2021-01-27 NOTE — TELEPHONE ENCOUNTER
S/w pt. He is in agreement with Dr. Parra's plan.  Sent RX for Lovenox 1 mg/kg q12h to pharmacy.    Pt to STOP warfarin and START Lovenox for 1 month.  Pt to contact Dr. Parra regarding lab order and f/u appt in March.    Pt verbalized understanding.    Eloisa Snyder, ArmandoD

## 2021-02-01 NOTE — TELEPHONE ENCOUNTER
VOICEMAIL  1. Caller Name: [phone] (home)                       Call Back Number: GIL ELIAS      2. Message: Pt. Called stating that he needed a refill request for his three medications. Encounters show that a refill request was routed to Erica on the 28th and approved today. LVM for pt letting him know to contact pharmacy for a good  time.     3. Patient approves office to leave a detailed voicemail/MyChart message: N\A

## 2021-02-11 ENCOUNTER — TELEPHONE (OUTPATIENT)
Dept: VASCULAR LAB | Facility: MEDICAL CENTER | Age: 36
End: 2021-02-11

## 2021-03-03 ENCOUNTER — HOSPITAL ENCOUNTER (OUTPATIENT)
Dept: LAB | Facility: MEDICAL CENTER | Age: 36
End: 2021-03-03
Attending: INTERNAL MEDICINE
Payer: COMMERCIAL

## 2021-03-03 PROCEDURE — 85610 PROTHROMBIN TIME: CPT

## 2021-03-03 PROCEDURE — 85730 THROMBOPLASTIN TIME PARTIAL: CPT

## 2021-03-03 PROCEDURE — 85613 RUSSELL VIPER VENOM DILUTED: CPT

## 2021-03-03 PROCEDURE — 85520 HEPARIN ASSAY: CPT

## 2021-03-03 PROCEDURE — 36415 COLL VENOUS BLD VENIPUNCTURE: CPT

## 2021-03-12 ENCOUNTER — TELEPHONE (OUTPATIENT)
Dept: VASCULAR LAB | Facility: MEDICAL CENTER | Age: 36
End: 2021-03-12

## 2021-03-12 NOTE — LETTER
March 12, 2021    Christ Calixto  [address]  [address]  [facility] [address]    03/12/21    Dear Christ Calixto ,    We have been unsuccessful in our attempts to contact you regarding your Anticoagulation Service appointments. Warfarin is a potent blood-thinning agent that requires monitoring to ensure that the dosage is correct for your body.  If it isn't, you could develop serious, sometimes life-threatening bleeding problems or life-threatening blood clots or stroke could result.    To monitor you effectively, we need to be able to communicate with you.  This is a requirement to be followed by our Service.       If you repeatedly fail to keep your lab appointments, you are at risk of being discharged from the Anticoagulation Service.    It is extremely important that you contact the clinic as soon as possible to arrange appropriate follow up.  We are open Monday-Friday 8 am until 5 pm.  You may reach our Service at [phone].           Sincerely,           Branden Allison, PharmD, [facility]  Clinic Supervisor  [facility]  Outpatient Anticoagulation Service

## 2021-03-13 NOTE — TELEPHONE ENCOUNTER
Left message for pt to have INR checked  2nd call  Letter sent  Silvia Petty, Clinical Pharmacist, CDE, CACP

## 2021-03-16 ENCOUNTER — APPOINTMENT (OUTPATIENT)
Dept: RADIOLOGY | Facility: MEDICAL CENTER | Age: 36
End: 2021-03-16
Payer: COMMERCIAL

## 2021-03-22 NOTE — TELEPHONE ENCOUNTER
Pt currently on Lovenox pending Dr. Parra's recommendations. Pt called to confirm this. Will await further contact from pt regarding disposition of anticoagulation moving forward.    Aj Stapleton, ArmandoD

## 2021-03-23 ENCOUNTER — HOSPITAL ENCOUNTER (OUTPATIENT)
Dept: LAB | Facility: MEDICAL CENTER | Age: 36
End: 2021-03-23
Attending: INTERNAL MEDICINE
Payer: COMMERCIAL

## 2021-03-23 PROCEDURE — 85610 PROTHROMBIN TIME: CPT

## 2021-03-23 PROCEDURE — 85520 HEPARIN ASSAY: CPT

## 2021-03-23 PROCEDURE — 36415 COLL VENOUS BLD VENIPUNCTURE: CPT

## 2021-03-23 PROCEDURE — 85613 RUSSELL VIPER VENOM DILUTED: CPT

## 2021-03-23 PROCEDURE — 85730 THROMBOPLASTIN TIME PARTIAL: CPT

## 2021-04-19 DIAGNOSIS — I63.529 CEREBROVASCULAR ACCIDENT (CVA) DUE TO OCCLUSION OF ANTERIOR CEREBRAL ARTERY, UNSPECIFIED BLOOD VESSEL LATERALITY (HCC): ICD-10-CM

## 2021-04-19 DIAGNOSIS — R76.0 LUPUS ANTICOAGULANT POSITIVE: ICD-10-CM

## 2021-04-19 NOTE — PROGRESS NOTES
[facility]    Spoke with pt and he confirms he has been holding warfarin.  He has a lupus anticoagulant test scheduled for 4/22 and will have held enoxaparin 10 days prior to the test.  He reports Dr Parra instructed him to restart enoxaparin as soon as he finishes his test. He does not have an apt with Dr Parra but reports the physician is going to call him to discuss results and if warfarin should be continued.     I will follow up with him 5-7 days after the laboratory work.    Branden Allison, PharmD

## 2021-04-22 ENCOUNTER — HOSPITAL ENCOUNTER (OUTPATIENT)
Dept: LAB | Facility: MEDICAL CENTER | Age: 36
End: 2021-04-22
Attending: INTERNAL MEDICINE
Payer: COMMERCIAL

## 2021-04-22 PROCEDURE — 85610 PROTHROMBIN TIME: CPT

## 2021-04-22 PROCEDURE — 85613 RUSSELL VIPER VENOM DILUTED: CPT

## 2021-04-22 PROCEDURE — 85730 THROMBOPLASTIN TIME PARTIAL: CPT

## 2021-04-22 PROCEDURE — 85520 HEPARIN ASSAY: CPT

## 2021-04-22 PROCEDURE — 86147 CARDIOLIPIN ANTIBODY EA IG: CPT

## 2021-04-22 PROCEDURE — 80500 HCHG CLINICAL PATH CONSULT-LIMITED: CPT

## 2021-04-22 PROCEDURE — 36415 COLL VENOUS BLD VENIPUNCTURE: CPT

## 2021-04-23 LAB
APTT PPP: 29.6 SEC (ref 24.7–36)
DRVVT MIX 37 DRVMX37: 58.7 SEC (ref 28–48)
DRVVT MIX IMMEDIATE DRVMXI: 57.5 SEC (ref 28–48)
INR PPP: 0.93 (ref 0.87–1.13)
LA PPP-IMP: POSITIVE
PROTHROMBIN TIME: 12.8 SEC (ref 12–14.6)
SCREEN DRVVT: 86.6 SEC (ref 28–48)
UFH PPP CHRO-ACNC: <0.1 U/ML

## 2021-04-24 LAB
PATH REV: NORMAL
PATH REV: NORMAL

## 2021-04-24 PROCEDURE — 80500 HCHG CLINICAL PATH CONSULT-LIMITED: CPT

## 2021-04-27 ENCOUNTER — TELEPHONE (OUTPATIENT)
Dept: VASCULAR LAB | Facility: MEDICAL CENTER | Age: 36
End: 2021-04-27

## 2021-04-27 NOTE — TELEPHONE ENCOUNTER
[facility]    Pt did obtain labwork for Dr Parra.  Pt has not discussed the results yet with the physician.  Pt has restarted enoxaparin without any issues.     Pt to contact the clinic once a choice of anticoagulant is determined with Dr Parra.     Will follow up with pt in two weeks.    Branden Allison, ArmandoD

## 2021-05-03 ENCOUNTER — TELEPHONE (OUTPATIENT)
Dept: VASCULAR LAB | Facility: MEDICAL CENTER | Age: 36
End: 2021-05-03

## 2021-05-03 NOTE — TELEPHONE ENCOUNTER
Received communication from Dr. Parra that pt was positive for Lupus Anticoagulant. Pt will now be restarting therapy w/ warfarin.     Pt currently has a supply of warfarin and will resume his previous regimen of 10 mg M/W and 5 mg ROW.     Scheduled pt for INR check on 5/7 @ 9:30am per pt preference. He is to continue therapeutic Lovenox until INR >/= 2. Pt expressed understanding and was in agreement.    Aj Stapleton, ArmandoD

## 2021-05-07 ENCOUNTER — TELEPHONE (OUTPATIENT)
Dept: VASCULAR LAB | Facility: MEDICAL CENTER | Age: 36
End: 2021-05-07

## 2021-05-07 ENCOUNTER — ANTICOAGULATION VISIT (OUTPATIENT)
Dept: VASCULAR LAB | Facility: MEDICAL CENTER | Age: 36
End: 2021-05-07
Attending: INTERNAL MEDICINE
Payer: COMMERCIAL

## 2021-05-07 DIAGNOSIS — R76.0 LUPUS ANTICOAGULANT POSITIVE: ICD-10-CM

## 2021-05-07 LAB
INR BLD: 1.5 (ref 0.9–1.2)
INR PPP: 1.5 (ref 2–3.5)

## 2021-05-07 PROCEDURE — 99212 OFFICE O/P EST SF 10 MIN: CPT

## 2021-05-07 PROCEDURE — 85610 PROTHROMBIN TIME: CPT

## 2021-05-07 NOTE — PROGRESS NOTES
Anticoagulation Summary  As of 2021    INR goal:  2.0-3.0   TTR:  45.5 % (8.4 mo)   INR used for dosin.50 (2021)   Warfarin maintenance plan:  10 mg (10 mg x 1) every Mon, Wed, Fri; 5 mg (5 mg x 1) all other days   Weekly warfarin total:  50 mg   Plan last modified:  Silvia Petty (2021)   Next INR check:  2021   Target end date:  11/3/2020    Indications    Lupus anticoagulant positive [R76.0]  Cerebrovascular accident (CVA) due to vascular occlusion (HCC) [I63.9]             Anticoagulation Episode Summary     INR check location:      Preferred lab:      Send INR reminders to:      Comments:        Anticoagulation Care Providers     Provider Role Specialty Phone number    Aayush Parra M.D. Referring Oncology [phone]    [facility] Responsible  [phone]    Julio Gibbons, PharmD Responsible          Anticoagulation Patient Findings  Patient Findings     Negatives:  Signs/symptoms of thrombosis, Signs/symptoms of bleeding, Laboratory test error suspected, Change in health, Change in alcohol use, Change in activity, Upcoming invasive procedure, Emergency department visit, Upcoming dental procedure, Missed doses, Extra doses, Change in medications, Change in diet/appetite, Hospital admission, Bruising, Other complaints            History of Present Illness: follow up appointment for chronic anticoagulation with the high risk medication, warfarin for CVA/ lupus anticoagulant positive    Last INR was out of range, dosage adjusted: pt remains sub therapeutic trending upward.  PT CONTINUES TO BRIDGE WITH LOVENOX.  Will bolus dose with 15mg tonight, AND increase weekly dose by 10%. PT is unable to afford his coinsurance to see us (~100/visit)  Will route to Melissa Díaz to submit application to [employer].  Follow up in 1 weeks, to reduce the risk of adverse events related to this high risk medication, warfarin.    Silvia Petty, Clinical Pharmacist

## 2021-05-12 ENCOUNTER — TELEPHONE (OUTPATIENT)
Dept: VASCULAR LAB | Facility: MEDICAL CENTER | Age: 36
End: 2021-05-12

## 2021-05-12 ENCOUNTER — OFFICE VISIT (OUTPATIENT)
Dept: MEDICAL GROUP | Facility: PHYSICIAN GROUP | Age: 36
End: 2021-05-12
Payer: COMMERCIAL

## 2021-05-12 VITALS
TEMPERATURE: 98.2 F | SYSTOLIC BLOOD PRESSURE: 128 MMHG | HEART RATE: 90 BPM | WEIGHT: 315 LBS | HEIGHT: 70 IN | DIASTOLIC BLOOD PRESSURE: 84 MMHG | OXYGEN SATURATION: 97 % | BODY MASS INDEX: 45.1 KG/M2 | RESPIRATION RATE: 16 BRPM

## 2021-05-12 DIAGNOSIS — I10 ESSENTIAL HYPERTENSION: ICD-10-CM

## 2021-05-12 DIAGNOSIS — M25.562 ACUTE PAIN OF LEFT KNEE: ICD-10-CM

## 2021-05-12 DIAGNOSIS — R76.0 LUPUS ANTICOAGULANT POSITIVE: ICD-10-CM

## 2021-05-12 DIAGNOSIS — I63.50 CEREBROVASCULAR ACCIDENT (CVA) DUE TO OCCLUSION OF CEREBRAL ARTERY (HCC): ICD-10-CM

## 2021-05-12 DIAGNOSIS — K64.9 HEMORRHOIDS, UNSPECIFIED HEMORRHOID TYPE: ICD-10-CM

## 2021-05-12 DIAGNOSIS — K76.0 NAFLD (NONALCOHOLIC FATTY LIVER DISEASE): ICD-10-CM

## 2021-05-12 DIAGNOSIS — E11.9 TYPE 2 DIABETES MELLITUS WITHOUT COMPLICATION, WITHOUT LONG-TERM CURRENT USE OF INSULIN (HCC): ICD-10-CM

## 2021-05-12 PROBLEM — M54.42 ACUTE MIDLINE LOW BACK PAIN WITH LEFT-SIDED SCIATICA: Status: RESOLVED | Noted: 2020-09-03 | Resolved: 2021-05-12

## 2021-05-12 PROBLEM — E11.69 HYPERLIPIDEMIA ASSOCIATED WITH TYPE 2 DIABETES MELLITUS (HCC): Status: ACTIVE | Noted: 2021-05-12

## 2021-05-12 PROBLEM — G47.33 OSA (OBSTRUCTIVE SLEEP APNEA): Status: RESOLVED | Noted: 2020-01-30 | Resolved: 2021-05-12

## 2021-05-12 PROBLEM — F41.9 ANXIETY: Status: RESOLVED | Noted: 2020-02-18 | Resolved: 2021-05-12

## 2021-05-12 PROBLEM — E78.5 HYPERLIPIDEMIA ASSOCIATED WITH TYPE 2 DIABETES MELLITUS (HCC): Status: ACTIVE | Noted: 2021-05-12

## 2021-05-12 PROCEDURE — 99204 OFFICE O/P NEW MOD 45 MIN: CPT | Performed by: INTERNAL MEDICINE

## 2021-05-12 ASSESSMENT — PATIENT HEALTH QUESTIONNAIRE - PHQ9: CLINICAL INTERPRETATION OF PHQ2 SCORE: 0

## 2021-05-12 ASSESSMENT — FIBROSIS 4 INDEX: FIB4 SCORE: 0.84

## 2021-05-12 NOTE — PATIENT INSTRUCTIONS
Preparation H Suppositories  Set Up MyChart  Call to get x-ray  Get labs  You will be called by GI for hemorrhoid referral

## 2021-05-12 NOTE — PROGRESS NOTES
Subjective:     CC: Establish care    HISTORY OF THE PRESENT ILLNESS: Patient is a 36 y.o. male. This pleasant patient is here today to establish care and discuss the following issues:    The patient reports left knee pain for 1 to 2 months.  He does not recall any trauma to the area.  He has not noticed any redness or swelling of the knee.    The patient reports hard stools for approximately 10 months.  Over the last 4 to 5 months he has had pain with bowel movements.  He states it feels like it may be cutting him.  He is taking an OTC stool softener.  He has noticed some red flecks of blood on a couple of occasions.  Over the last couple of months he has tried to increase his fruit and vegetable intake.  He drinks large amounts of water.      Allergies: Patient has no allergy information on record.    Current Outpatient Medications Ordered in Epic   Medication Sig Dispense Refill   • Warfarin Sodium (MD ALERT...WARFARIN PER PHYSICIAN) by Other route per provider order.     • enoxaparin (LOVENOX) 150 MG/ML Solution Inject 150 mg under the skin every 12 hours. 14 Each 1   • lisinopril-hydrochlorothiazide (PRINZIDE) 20-25 MG per tablet Take 1 tablet by mouth once daily 30 Tab 5   • atorvastatin (LIPITOR) 20 MG Tab Take 1 tablet by mouth once daily 30 Tab 5   • glyBURIDE-metFORMIN (GLUCOVANCE) 5-500 MG Tab TAKE 1 TABLET BY MOUTH IN THE MORNING WITH BREAKFAST 30 Tab 5   • aspirin (ASA) 81 MG Chew Tab chewable tablet Take 1 Tab by mouth every day. 100 Tab 5     No current Epic-ordered facility-administered medications on file.       Past Medical History:   Diagnosis Date   • Acute midline low back pain with left-sided sciatica 9/3/2020   • Anxiety 2/18/2020   • Diabetes (HCC)    • Hypertension     first  discovered 2012   • Obesities, morbid (HCC)     since childhood   • JAYLIN (obstructive sleep apnea) 1/30/2020   • Smoking 7/10/2014   • Stroke (HCC)        History reviewed. No pertinent surgical history.    Social  "History     Tobacco Use   • Smoking status: Former Smoker     Packs/day: 0.50     Years: 8.00     Pack years: 4.00     Types: Cigarettes     Quit date: 2020     Years since quittin.2   • Smokeless tobacco: Never Used   Vaping Use   • Vaping Use: Unknown   Substance Use Topics   • Alcohol use: No   • Drug use: Not Currently     Comment: smokes sarai        Social History     Social History Narrative   • Not on file       Family History   Problem Relation Age of Onset   • Diabetes Mother    • Diabetes Father    • Heart Disease Paternal Grandfather         MI       Health Maintenance: Completed    ROS:   Gen: no fevers/chills  Pulm: no sob, no cough  CV: no chest pain, no palpitations  GI: no nausea/vomiting, no diarrhea  Neuro: no headaches, no numbness/tingling      Objective:     Exam: /84   Pulse 90   Temp 36.8 °C (98.2 °F)   Resp 16   Ht 1.778 m (5' 10\")   Wt (!) 186 kg (409 lb)   SpO2 97%  Body mass index is 58.69 kg/m².    General: Normal appearing. No distress.  HEENT: Normocephalic. Eyes conjunctiva clear lids without ptosis, pupils equal and reactive to light accommodation, oropharynx is without erythema, edema or exudates.   Pulmonary: Clear to ausculation.  Normal effort. No rales, ronchi, or wheezing.  Cardiovascular: Regular rate and rhythm without murmur.   Abdomen: Soft, nontender, nondistended.   Musculoskeletal: No extremity cyanosis, clubbing, or edema.  Knee: Left knee with full range of motion, no erythema or swelling.  Psych: Normal mood and affect. Alert and oriented x3. Judgment and insight is normal.    Labs: Reviewed and discussed with patient.    Assessment & Plan:   36 y.o. male with the following -    Type 2 diabetes mellitus without complication, without long-term current use of insulin (HCC)   This is a chronic condition.  The patient is on glyburide-Metformin (Glucovance) 5-500 mg daily.  Hemoglobin A1c from 2020 was 7.7.  He had a normal microalbumin " creatinine ratio 14 on 6/8/2020.  He does not monitor his glucose values at home.  -Continue glyburide-metformin (Glucovance) 5-500 mg daily  - Comp Metabolic Panel; Future  - HEMOGLOBIN A1C; Future  - MICROALBUMIN CREAT RATIO URINE; Future    Essential hypertension  This is a chronic condition.  The patient is on lisinopril-HCTZ 20-25 mg daily.  He does not monitor his BP at home.  BP is normal at today's visit.  -Continue lisinopril-HCTZ 20-25 mg daily    Cerebrovascular accident (CVA) due to occlusion of cerebral artery (HCC)   Lupus anticoagulant positive   This is a chronic condition.  The patient was admitted 1/29/2020 for possible seizure-like activity.  MRI on 1/30/2020 showed a 7 mm lesion in the left lateral occipital lobe-parietal lobe, likely an acute infarct as well as an additional left parietal lobe lesion representing likely acute infarction.  Subsequent hypercoagulability work-up outside of [facility] revealed that he was positive for antiphospholipid antibody syndrome.  He is currently on warfarin with a Lovenox bridge.  He is followed by the anticoagulation clinic.  Once he is stable on his warfarin he will be transitioned off of the Lovenox.  He will need lifelong anticoagulation due to his antiphospholipid antibody syndrome diagnosis.  He is also on aspirin 81 mg daily and lipid control with atorvastatin 20 mg nightly.  Lipid panel from 6/8/2020 showed a total cholesterol of 91, LDL 46, HDL 31, triglycerides 68.     -Continue enoxaparin 150 mg every 12 hours  -Continue aspirin 81 mg daily  -Continue lipid control with atorvastatin 20 mg nightly  - Lipid Profile; Future    Hemorrhoids, unspecified hemorrhoid type  This is an acute condition.  He is currently taking stool softeners.  Rectal exam deferred.  -Recommended OTC hemorrhoid treatment suppositories such as Preparation H  -Referral to gastroenterology for further evaluation and definitive treatment  -He is to continue stool softeners  -  REFERRAL TO GASTROENTEROLOGY    Acute pain of left knee  This is an acute condition.  No antecedent trauma.  Knee without erythema or swelling.  Patient reports intermittent posterior, anterior pain.  He is not able to take ibuprofen as he is on anticoagulation.  - DX-KNEE COMPLETE 4+ LEFT; Future    NAFLD (nonalcoholic fatty liver disease)  This is a chronic condition.  Abdominal ultrasound from 2014 showed hepatomegaly with fatty changes.  His most recent liver enzymes were normal.  -Continue to monitor    Return in about 4 weeks (around 6/9/2021).    Please note that this dictation was created using voice recognition software. I have made every reasonable attempt to correct obvious errors, but I expect that there are errors of grammar and possibly content that I did not discover before finalizing the note.

## 2021-05-13 ENCOUNTER — ANTICOAGULATION VISIT (OUTPATIENT)
Dept: VASCULAR LAB | Facility: MEDICAL CENTER | Age: 36
End: 2021-05-13
Attending: INTERNAL MEDICINE
Payer: COMMERCIAL

## 2021-05-13 DIAGNOSIS — R76.0 LUPUS ANTICOAGULANT POSITIVE: ICD-10-CM

## 2021-05-13 LAB — INR PPP: 2.5 (ref 2–3.5)

## 2021-05-13 PROCEDURE — 99211 OFF/OP EST MAY X REQ PHY/QHP: CPT

## 2021-05-13 PROCEDURE — 85610 PROTHROMBIN TIME: CPT

## 2021-05-13 RX ORDER — WARFARIN SODIUM 5 MG/1
TABLET ORAL
Qty: 60 TABLET | Refills: 2 | Status: SHIPPED | OUTPATIENT
Start: 2021-05-13 | End: 2021-06-24 | Stop reason: SDUPTHER

## 2021-05-13 NOTE — TELEPHONE ENCOUNTER
[facility]    Pt has been on warfarin with enoxaparin bridge for the past 5 days.  Requested pt to take last dose of enoxaparin tonight and to come to the clinic tomorrow to test his next INR.

## 2021-05-13 NOTE — PROGRESS NOTES
Anticoagulation Summary  As of 2021    INR goal:  2.0-3.0   TTR:  45.6 % (8.6 mo)   INR used for dosin.50 (2021)   Warfarin maintenance plan:  10 mg (10 mg x 1) every Mon, Wed, Fri; 5 mg (5 mg x 1) all other days   Weekly warfarin total:  50 mg   Plan last modified:  Silvia Petty (2021)   Next INR check:  6/3/2021   Target end date:  Indefinite    Indications    Lupus anticoagulant positive [R76.0]  Cerebrovascular accident (CVA) due to vascular occlusion (HCC) [I63.9]             Anticoagulation Episode Summary     INR check location:      Preferred lab:      Send INR reminders to:      Comments:        Anticoagulation Care Providers     Provider Role Specialty Phone number    Aayush Parra M.D. Referring Oncology [phone]    [facility] Responsible  [phone]    Julio Gibbons, PharmD Responsible          Anticoagulation Patient Findings      HPI:  Christ Calixto seen in clinic today, on anticoagulation therapy with warfarin for CVA  Changes to current medical/health status since last appt: Patient was previously low and was bridging with LOVENOX.  Denies signs/symptoms of bleeding and/or thrombosis since the last appt.    Denies any interval changes to diet  Denies any interval changes to medications since last appt.   Denies any complications or cost restrictions with current therapy.   BP declined today.  Confirmed current dosing regimen.     Patient is on antiplatelet therapy with ASA 81mg QD and must be reviewed again on 21.       A/P   INR is therapeutic today at 2.5.  Patient instructed to STOP Lovenox and continue with the current dosing regimen of warfarin.   Patient will follow up again in 3 weeks (per pt preference)      Next appt: Thursday, Shana 3, 2021 11am    Alberto RobertsD

## 2021-06-03 ENCOUNTER — ANTICOAGULATION VISIT (OUTPATIENT)
Dept: VASCULAR LAB | Facility: MEDICAL CENTER | Age: 36
End: 2021-06-03
Attending: INTERNAL MEDICINE
Payer: COMMERCIAL

## 2021-06-03 DIAGNOSIS — R76.0 LUPUS ANTICOAGULANT POSITIVE: ICD-10-CM

## 2021-06-03 DIAGNOSIS — I63.81 CEREBROVASCULAR ACCIDENT (CVA) DUE TO OCCLUSION OF SMALL ARTERY (HCC): ICD-10-CM

## 2021-06-03 LAB
INR BLD: 3.1 (ref 0.9–1.2)
INR PPP: 3.1 (ref 2–3.5)

## 2021-06-03 PROCEDURE — 85610 PROTHROMBIN TIME: CPT

## 2021-06-03 PROCEDURE — 99212 OFFICE O/P EST SF 10 MIN: CPT | Performed by: NURSE PRACTITIONER

## 2021-06-03 NOTE — PROGRESS NOTES
Anticoagulation Summary  As of 6/3/2021    INR goal:  2.0-3.0   TTR:  48.5 % (9.3 mo)   INR used for dosing:  3.10 (6/3/2021)   Warfarin maintenance plan:  10 mg (10 mg x 1) every Mon, Wed; 5 mg (5 mg x 1) all other days   Weekly warfarin total:  45 mg   Plan last modified:  China Nelson, A.P.NChris (6/3/2021)   Next INR check:  6/17/2021   Target end date:  Indefinite    Indications    Lupus anticoagulant positive [R76.0]  Cerebrovascular accident (CVA) due to vascular occlusion (HCC) [I63.9]             Anticoagulation Episode Summary     INR check location:      Preferred lab:      Send INR reminders to:      Comments:        Anticoagulation Care Providers     Provider Role Specialty Phone number    Aayush Parra M.D. Referring Oncology [phone]    [facility] Responsible  [phone]    Julio Gibbons, PharmD Responsible          Anticoagulation Patient Findings      HPI:  Christ Calixto seen in clinic today for follow up on anticoagulation therapy in the presence of lupus anticoagulant, CVA hx.   Denies any changes to current medical/health status since last appointment.   Denies any medication or diet changes.   No current symptoms of bleeding or thrombosis reported.  Verified dose with patient. He is taking 5 mg on Fridays (not 10 mg as previously charted),.  Pt on antiplatelet therapy Aspirin 81mg for CVA per neurology.    A/P:   INR slightly therapeutic.   Continue current regimen.     Pt educated to contact our clinic with any changes in medications or s/s of bleeding or thrombosis. Pt is aware to seek immediate medical attention for falls, head injury or deep cuts    Follow up appointment in 2 week(s).    Next Appointment: Thursday, June 17, 2021 at 11:45 am.    China MARLOW

## 2021-06-07 ENCOUNTER — HOSPITAL ENCOUNTER (OUTPATIENT)
Dept: RADIOLOGY | Facility: MEDICAL CENTER | Age: 36
End: 2021-06-07
Attending: INTERNAL MEDICINE
Payer: COMMERCIAL

## 2021-06-07 DIAGNOSIS — M25.562 ACUTE PAIN OF LEFT KNEE: ICD-10-CM

## 2021-06-07 PROCEDURE — 73564 X-RAY EXAM KNEE 4 OR MORE: CPT | Mod: LT

## 2021-06-08 ENCOUNTER — OFFICE VISIT (OUTPATIENT)
Dept: MEDICAL GROUP | Facility: PHYSICIAN GROUP | Age: 36
End: 2021-06-08
Payer: COMMERCIAL

## 2021-06-08 VITALS
HEIGHT: 70 IN | BODY MASS INDEX: 45.1 KG/M2 | RESPIRATION RATE: 16 BRPM | HEART RATE: 89 BPM | TEMPERATURE: 98 F | SYSTOLIC BLOOD PRESSURE: 130 MMHG | OXYGEN SATURATION: 97 % | DIASTOLIC BLOOD PRESSURE: 68 MMHG | WEIGHT: 315 LBS

## 2021-06-08 DIAGNOSIS — K76.0 NAFLD (NONALCOHOLIC FATTY LIVER DISEASE): ICD-10-CM

## 2021-06-08 DIAGNOSIS — K64.9 HEMORRHOIDS, UNSPECIFIED HEMORRHOID TYPE: ICD-10-CM

## 2021-06-08 DIAGNOSIS — E11.9 TYPE 2 DIABETES MELLITUS WITHOUT COMPLICATION, WITHOUT LONG-TERM CURRENT USE OF INSULIN (HCC): ICD-10-CM

## 2021-06-08 DIAGNOSIS — I63.50 CEREBROVASCULAR ACCIDENT (CVA) DUE TO OCCLUSION OF CEREBRAL ARTERY (HCC): ICD-10-CM

## 2021-06-08 DIAGNOSIS — G89.29 CHRONIC PAIN OF LEFT KNEE: ICD-10-CM

## 2021-06-08 DIAGNOSIS — M25.562 CHRONIC PAIN OF LEFT KNEE: ICD-10-CM

## 2021-06-08 DIAGNOSIS — R76.0 LUPUS ANTICOAGULANT POSITIVE: ICD-10-CM

## 2021-06-08 DIAGNOSIS — I10 ESSENTIAL HYPERTENSION: ICD-10-CM

## 2021-06-08 PROCEDURE — 99214 OFFICE O/P EST MOD 30 MIN: CPT | Performed by: INTERNAL MEDICINE

## 2021-06-08 RX ORDER — HYDROCORTISONE ACETATE 25 MG/1
25 SUPPOSITORY RECTAL EVERY 12 HOURS
Qty: 100 SUPPOSITORY | Refills: 3 | Status: ON HOLD | OUTPATIENT
Start: 2021-06-08 | End: 2021-11-09

## 2021-06-08 ASSESSMENT — FIBROSIS 4 INDEX: FIB4 SCORE: 0.84

## 2021-06-08 NOTE — PROGRESS NOTES
Subjective:     CC: Follow-up on labs    HPI:   Christ presents today to discuss the following issues:    Patient states that his knee is slowly feeling better.  Since it is improving he does not feel like he needs a referral to physical therapy at this time.  He does report continued hemorrhoid pain.  He has an appointment with gastroenterology on 2021.  He reports a lot of pain and itching, no bleeding.  He tried the Preparation H without much success.  He is also taking stool softeners and following all the diet and exercise recommendations.  He did not have his labs done.      Past Medical History:   Diagnosis Date   • Acute midline low back pain with left-sided sciatica 9/3/2020   • Anxiety 2020   • Diabetes (HCC)    • Hypertension     first  discovered    • Obesities, morbid (HCC)     since childhood   • JAYLIN (obstructive sleep apnea) 2020   • Smoking 7/10/2014   • Stroke (HCC)        Social History     Tobacco Use   • Smoking status: Former Smoker     Packs/day: 0.50     Years: 8.00     Pack years: 4.00     Types: Cigarettes     Quit date: 2020     Years since quittin.3   • Smokeless tobacco: Never Used   Vaping Use   • Vaping Use: Unknown   Substance Use Topics   • Alcohol use: No   • Drug use: Not Currently     Comment: smokes marajuana        Current Outpatient Medications Ordered in Epic   Medication Sig Dispense Refill   • hydrocortisone (ANUSOL-HC) 25 MG Suppos Insert 1 Suppository into the rectum every 12 hours. 100 Suppository 3   • warfarin (COUMADIN) 5 MG Tab Patient to take 10mg on Mon & Wed and 5mg ROW or as directed by the [facility] 60 tablet 2   • Warfarin Sodium (MD ALERT...WARFARIN PER PHYSICIAN) by Other route per provider order.     • lisinopril-hydrochlorothiazide (PRINZIDE) 20-25 MG per tablet Take 1 tablet by mouth once daily 30 Tab 5   • atorvastatin (LIPITOR) 20 MG Tab Take 1 tablet by mouth once daily 30 Tab 5   • glyBURIDE-metFORMIN (GLUCOVANCE)  "5-500 MG Tab TAKE 1 TABLET BY MOUTH IN THE MORNING WITH BREAKFAST 30 Tab 5   • aspirin (ASA) 81 MG Chew Tab chewable tablet Take 1 Tab by mouth every day. 100 Tab 5     No current Epic-ordered facility-administered medications on file.       Allergies:  Patient has no allergy information on record.    Health Maintenance: Completed      Objective:     Exam:  /68   Pulse 89   Temp 36.7 °C (98 °F)   Resp 16   Ht 1.778 m (5' 10\")   Wt (!) 183 kg (403 lb)   SpO2 97%   BMI 57.82 kg/m²  Body mass index is 57.82 kg/m².    Gen: Alert and oriented, No apparent distress.  Lungs: Normal effort, CTA bilaterally, no wheezes, rhonchi, or rales  CV: Regular rate and rhythm. No murmurs, rubs, or gallops.  Ext: No clubbing, cyanosis, edema.      Assessment & Plan:     36 y.o. male with the following -     Type 2 diabetes mellitus without complication, without long-term current use of insulin ([facility])   This is a chronic condition.  The patient is on glyburide-Metformin (Glucovance) 5-500 mg daily.  Hemoglobin A1c from 6/8/2020 was 7.7.  He had a normal microalbumin creatinine ratio 14 on 6/8/2020.  He does not monitor his glucose values at home.  Labs pending.  -Continue glyburide-metformin (Glucovance) 5-500 mg daily       Essential hypertension  This is a chronic condition.  The patient is on lisinopril-HCTZ 20-25 mg daily.  He does not monitor his BP at home.  BP is normal at today's visit.  -Continue lisinopril-HCTZ 20-25 mg daily     Cerebrovascular accident (CVA) due to occlusion of cerebral artery (HCC)   Lupus anticoagulant positive   This is a chronic condition.    Stable.  The patient was admitted 1/29/2020 for possible seizure-like activity.  MRI on 1/30/2020 showed a 7 mm lesion in the left lateral occipital lobe-parietal lobe, likely an acute infarct as well as an additional left parietal lobe lesion representing likely acute infarction.  Subsequent hypercoagulability work-up outside of [facility] revealed that he " was positive for antiphospholipid antibody syndrome.  He is currently on warfarin with a Lovenox bridge.  He is followed by the anticoagulation clinic.  Once he is stable on his warfarin he will be transitioned off of the Lovenox.  He will need lifelong anticoagulation due to his antiphospholipid antibody syndrome diagnosis.  He is also on aspirin 81 mg daily and lipid control with atorvastatin 20 mg nightly.  Lipid panel from 6/8/2020 showed a total cholesterol of 91, LDL 46, HDL 31, triglycerides 68.     Labs pending.  -Continue enoxaparin 150 mg every 12 hours  -Continue aspirin 81 mg daily  -Continue lipid control with atorvastatin 20 mg nightly     Hemorrhoids, unspecified hemorrhoid type  This is a chronic condition.  Progressive.  He is currently taking stool softeners.  Preparation H provided limited relief.  He is scheduled to see gastroenterology on 7/1/2021. Rectal exam deferred.   - hydrocortisone (ANUSOL-HC) 25 MG Suppos; Insert 1 Suppository into the rectum every 12 hours.  Dispense: 100 Suppository; Refill: 3    Chronic pain of left knee  This is an acute condition.    Improving.  No antecedent trauma.  Knee without erythema or swelling.  Patient reports intermittent posterior, anterior pain.  He is not able to take ibuprofen as he is on anticoagulation.  Left knee x-ray on 6/7/2021 showed mild degenerative changes with a questionable small suprapatellar bursa effusion.  -Continue to monitor, patient declined PT referral as pain is improving    NAFLD (nonalcoholic fatty liver disease)  This is a chronic condition.    Stable.  Abdominal ultrasound from 2014 showed hepatomegaly with fatty changes.  His most recent liver enzymes were normal.  Labs pending.  -Continue to monitor      Return in about 3 months (around 9/8/2021).    Please note that this dictation was created using voice recognition software. I have made every reasonable attempt to correct obvious errors, but I expect that there are errors  of grammar and possibly content that I did not discover before finalizing the note.

## 2021-06-17 ENCOUNTER — ANTICOAGULATION VISIT (OUTPATIENT)
Dept: VASCULAR LAB | Facility: MEDICAL CENTER | Age: 36
End: 2021-06-17
Attending: INTERNAL MEDICINE
Payer: COMMERCIAL

## 2021-06-17 DIAGNOSIS — R76.0 LUPUS ANTICOAGULANT POSITIVE: ICD-10-CM

## 2021-06-17 LAB — INR PPP: 3.9 (ref 2–3.5)

## 2021-06-17 PROCEDURE — 85610 PROTHROMBIN TIME: CPT

## 2021-06-17 PROCEDURE — 99212 OFFICE O/P EST SF 10 MIN: CPT

## 2021-06-17 NOTE — PROGRESS NOTES
Anticoagulation Summary  As of 6/17/2021    INR goal:  2.0-3.0   TTR:  46.2 % (9.7 mo)   INR used for dosing:  3.90 (6/17/2021)   Warfarin maintenance plan:  10 mg (10 mg x 1) every Mon, Wed; 5 mg (5 mg x 1) all other days   Weekly warfarin total:  45 mg   Plan last modified:  SABINA Alvarez (6/3/2021)   Next INR check:  6/24/2021   Target end date:  Indefinite    Indications    Lupus anticoagulant positive [R76.0]  Cerebrovascular accident (CVA) due to vascular occlusion (HCC) [I63.9]             Anticoagulation Episode Summary     INR check location:      Preferred lab:      Send INR reminders to:      Comments:        Anticoagulation Care Providers     Provider Role Specialty Phone number    Aayush Parra M.D. Referring Oncology [phone]    [facility] Responsible  [phone]    Julio Gibbons, PharmD Responsible          Anticoagulation Patient Findings  Patient Findings     Positives:  Change in diet/appetite          HPI:  Christ Calxito seen in clinic today, on anticoagulation therapy with warfarin for lupus anticoagulant and hx of CVA.  Changes to current medical/health status since last appt: YES= The patient reports being constipated in addition to problematic hemorrhoids which have been so troublesome that he has not been eating much at all this past week. He has appt to have this addressed with his physician shortly. Until then recommend the patient use Miralax to help with constipation.  Denies signs/symptoms of bleeding and/or thrombosis since the last appt.    Any interval changes to diet= YES! Not eating per his usual.   Denies any interval changes to medications since last appt.   Denies any complications or cost restrictions with current therapy.   BP declined today.  Confirmed current dosing regimen.     Patient is on antiplatelet therapy with ASA 81mg QD for CVA per neurology and will be reviewed at next visit.        A/P   INR is SUPRA-therapeutic today  at 3.9.  Patient instructed to HOLD warfarin dose TONIGHT ONLY, then begin slightly reduced dose this week and retest again next Thursday.       Next appt: Thursday, June 24, 2021  9am    Alberto Huber PharmD

## 2021-06-24 ENCOUNTER — ANTICOAGULATION VISIT (OUTPATIENT)
Dept: VASCULAR LAB | Facility: MEDICAL CENTER | Age: 36
End: 2021-06-24
Attending: INTERNAL MEDICINE
Payer: COMMERCIAL

## 2021-06-24 DIAGNOSIS — R76.0 LUPUS ANTICOAGULANT POSITIVE: ICD-10-CM

## 2021-06-24 DIAGNOSIS — I63.529 CEREBROVASCULAR ACCIDENT (CVA) DUE TO OCCLUSION OF ANTERIOR CEREBRAL ARTERY, UNSPECIFIED BLOOD VESSEL LATERALITY (HCC): ICD-10-CM

## 2021-06-24 LAB — INR PPP: 2.1 (ref 2–3.5)

## 2021-06-24 PROCEDURE — 99212 OFFICE O/P EST SF 10 MIN: CPT | Performed by: NURSE PRACTITIONER

## 2021-06-24 PROCEDURE — 85610 PROTHROMBIN TIME: CPT

## 2021-06-24 RX ORDER — WARFARIN SODIUM 5 MG/1
TABLET ORAL
Qty: 60 TABLET | Refills: 2 | Status: SHIPPED | OUTPATIENT
Start: 2021-06-24 | End: 2021-10-25 | Stop reason: SDUPTHER

## 2021-06-24 NOTE — PROGRESS NOTES
Anticoagulation Summary  As of 2021    INR goal:  2.0-3.0   TTR:  46.3 % (10 mo)   INR used for dosin.10 (2021)   Warfarin maintenance plan:  5 mg (5 mg x 1) every day   Weekly warfarin total:  35 mg   Plan last modified:  TOM AlvarezPISAURA (2021)   Next INR check:  2021   Target end date:  Indefinite    Indications    Lupus anticoagulant positive [R76.0]  Cerebrovascular accident (CVA) due to vascular occlusion (HCC) [I63.9]             Anticoagulation Episode Summary     INR check location:      Preferred lab:      Send INR reminders to:      Comments:        Anticoagulation Care Providers     Provider Role Specialty Phone number    Aayush Parra M.D. Referring Oncology [phone]    [facility] Responsible  [phone]    Julio Gibbons, PharmD Responsible          Anticoagulation Patient Findings      HPI:  Christ Calixto seen in clinic today for follow up on anticoagulation therapy in the presence of CVA, lupus anticoagulant.   Denies any changes to current medical/health status since last appointment.   Denies any medication or diet changes.   Last week he reported constipation with problematic hemorrhoids. He reports no concerns with this currently.  No symptoms of bleeding or thrombosis reported.  Verified dose with patient. He took 5 mg yesterday instead of 10 mg.    Pt on antiplatelet therapy Aspirin 81mg for CVA and must be reviewed again with neurology.    A/P:   INR therapeutic. INR down from 3.9 last week. Will have patient began 5 mg daily.     Pt educated to contact our clinic with any changes in medications or s/s of bleeding or thrombosis. Pt is aware to seek immediate medical attention for falls, head injury or deep cuts    Follow up appointment in 1 week(s).    Next Appointment:  at 11:45 am.    [place] MARLOW

## 2021-06-25 LAB — INR BLD: 2.1 (ref 0.9–1.2)

## 2021-07-01 ENCOUNTER — ANTICOAGULATION VISIT (OUTPATIENT)
Dept: VASCULAR LAB | Facility: MEDICAL CENTER | Age: 36
End: 2021-07-01
Attending: INTERNAL MEDICINE
Payer: COMMERCIAL

## 2021-07-01 DIAGNOSIS — R76.0 LUPUS ANTICOAGULANT POSITIVE: ICD-10-CM

## 2021-07-01 LAB — INR PPP: 2.1 (ref 2–3.5)

## 2021-07-01 PROCEDURE — 99211 OFF/OP EST MAY X REQ PHY/QHP: CPT

## 2021-07-01 PROCEDURE — 85610 PROTHROMBIN TIME: CPT

## 2021-07-01 NOTE — PROGRESS NOTES
Anticoagulation Summary  As of 2021    INR goal:  2.0-3.0   TTR:  47.5 % (10.2 mo)   INR used for dosin.10 (2021)   Warfarin maintenance plan:  5 mg (5 mg x 1) every day   Weekly warfarin total:  35 mg   Plan last modified:  SABINA Alvarez (2021)   Next INR check:  2021   Target end date:  Indefinite    Indications    Lupus anticoagulant positive [R76.0]  Cerebrovascular accident (CVA) due to vascular occlusion (HCC) [I63.9]             Anticoagulation Episode Summary     INR check location:      Preferred lab:      Send INR reminders to:      Comments:        Anticoagulation Care Providers     Provider Role Specialty Phone number    Aayush Parra M.D. Referring Oncology [phone]    [facility] Responsible  [phone]    Julio Gibbons, PharmD Responsible          Anticoagulation Patient Findings  Patient Findings     Positives:  Change in diet/appetite          HPI:  Christ Calixto seen in clinic today, on anticoagulation therapy with warfarin for CVA and lupus anticoagulant.  Changes to current medical/health status since last appt: none  Denies signs/symptoms of bleeding and/or thrombosis since the last appt.    Any interval changes to diet= patient reports diet very different as he is afraid to eat things that will cause constipation since he is having issues with hemorrhoids.  Denies any interval changes to medications since last appt.   Denies any complications or cost restrictions with current therapy.   BP declined today.  Confirmed current dosing regimen.     Patient is on antiplatelet therapy with ASA 81mg QD for CBA and will be reviewed again with neurology.       A/P   INR is therapeutic today at 2.1.  Patient instructed to continue with the current warfarin dosing regimen, and asked to follow up again in 1 week.   He has appt for hemorrhoids later today and hopes he will be able to return to more diverse diet.       Next appt: Thursday,  July 8, 2021 11:45am     Zach Gundersen  Pharm D Student  Alberto RobertsD

## 2021-07-08 ENCOUNTER — ANTICOAGULATION VISIT (OUTPATIENT)
Dept: VASCULAR LAB | Facility: MEDICAL CENTER | Age: 36
End: 2021-07-08
Attending: INTERNAL MEDICINE
Payer: COMMERCIAL

## 2021-07-08 DIAGNOSIS — R76.0 LUPUS ANTICOAGULANT POSITIVE: ICD-10-CM

## 2021-07-08 LAB — INR PPP: 2.4 (ref 2–3.5)

## 2021-07-08 PROCEDURE — 85610 PROTHROMBIN TIME: CPT

## 2021-07-08 PROCEDURE — 99211 OFF/OP EST MAY X REQ PHY/QHP: CPT

## 2021-07-08 NOTE — PROGRESS NOTES
Anticoagulation Summary  As of 2021    INR goal:  2.0-3.0   TTR:  48.7 % (10.4 mo)   INR used for dosin.40 (2021)   Warfarin maintenance plan:  5 mg (5 mg x 1) every day   Weekly warfarin total:  35 mg   Plan last modified:  SABINA Alvarez (2021)   Next INR check:  2021   Target end date:  Indefinite    Indications    Lupus anticoagulant positive [R76.0]  Cerebrovascular accident (CVA) due to vascular occlusion (HCC) [I63.9]             Anticoagulation Episode Summary     INR check location:      Preferred lab:      Send INR reminders to:      Comments:        Anticoagulation Care Providers     Provider Role Specialty Phone number    Aayush Parra M.D. Referring Oncology [phone]    [facility] Responsible  [phone]    Julio Gibbons, PharmD Responsible          Anticoagulation Patient Findings  Patient Findings     Positives:  Change in medications    Comments:  Pt using Anusol HC           HPI:  Christ Calixto seen in clinic today, on anticoagulation therapy with warfarin for CVA and lupus anticoagulant  Changes to current medical/health status since last appt: Patient seen proctologist about hemmoroids and given topical treatment. Patient reports it has improved some but may need surgery in the future.   Denies signs/symptoms of bleeding and/or thrombosis since the last appt.    Denies any interval changes to diet  Denies any interval changes to medications since last appt.   Denies any complications or cost restrictions with current therapy.   BP declined today.  Confirmed current dosing regimen.     Patient is on antiplatelet therapy with ASA 81mg QD for CVA and will be reviewed again with neurology.     A/P   INR is therapeutic today at 2.4.  Patient instructed to continue with the current warfarin dosing regimen, and asked to follow up again in 2 weeks      Next appt:   1:45pm     Zach Gundersen Pharm D Student  Alberto  Mayo RobertsD

## 2021-07-22 ENCOUNTER — ANTICOAGULATION VISIT (OUTPATIENT)
Dept: VASCULAR LAB | Facility: MEDICAL CENTER | Age: 36
End: 2021-07-22
Attending: INTERNAL MEDICINE
Payer: COMMERCIAL

## 2021-07-22 VITALS — HEART RATE: 92 BPM | DIASTOLIC BLOOD PRESSURE: 82 MMHG | SYSTOLIC BLOOD PRESSURE: 116 MMHG

## 2021-07-22 DIAGNOSIS — R76.0 LUPUS ANTICOAGULANT POSITIVE: ICD-10-CM

## 2021-07-22 LAB
INR BLD: 2.5 (ref 0.9–1.2)
INR PPP: 2.5 (ref 2–3.5)

## 2021-07-22 PROCEDURE — 85610 PROTHROMBIN TIME: CPT

## 2021-07-22 PROCEDURE — 99211 OFF/OP EST MAY X REQ PHY/QHP: CPT

## 2021-07-22 NOTE — PROGRESS NOTES
Anticoagulation Summary  As of 2021    INR goal:  2.0-3.0   TTR:  50.9 % (10.9 mo)   INR used for dosin.50 (2021)   Warfarin maintenance plan:  5 mg (5 mg x 1) every day   Weekly warfarin total:  35 mg   Plan last modified:  SABINA Alvarez (2021)   Next INR check:  2021   Target end date:  Indefinite    Indications    Lupus anticoagulant positive [R76.0]  Cerebrovascular accident (CVA) due to vascular occlusion (HCC) [I63.9]             Anticoagulation Episode Summary     INR check location:      Preferred lab:      Send INR reminders to:      Comments:        Anticoagulation Care Providers     Provider Role Specialty Phone number    Aayush Parra M.D. Referring Oncology [phone]    [facility] Responsible  [phone]    Julio Gibbons, PharmD Responsible          Anticoagulation Patient Findings  Patient Findings     Negatives:  Signs/symptoms of thrombosis, Signs/symptoms of bleeding, Laboratory test error suspected, Change in health, Change in alcohol use, Change in activity, Upcoming invasive procedure, Emergency department visit, Upcoming dental procedure, Missed doses, Extra doses, Change in medications, Change in diet/appetite, Hospital admission, Bruising, Other complaints          HPI:  Christ Calixto seen in clinic today, on anticoagulation therapy with warfarin for Lupus anticoagulant positive  Changes to current medical/health status since last appt: None  Denies signs/symptoms of bleeding and/or thrombosis since the last appt.    Denies any interval changes to diet  Denies any interval changes to medications since last appt.   Denies any complications or cost restrictions with current therapy.   BP recorded in vitals.  Confirmed current dosing regimen.     Patient is on antiplatelet therapy, aspirin 81 mg for CVA.     A/P   INR is therapeutic today at 2.5  Patient instructed to continue with the current warfarin dosing regimen, and asked  to follow up again in 4 weeks.    Next appt: Aug, 19    Zach Gundersen  Pharm D Student    Alberto RobertsD

## 2021-07-29 DIAGNOSIS — I63.59 CEREBROVASCULAR ACCIDENT (CVA) DUE TO OCCLUSION OF OTHER CEREBRAL ARTERY (HCC): ICD-10-CM

## 2021-07-29 DIAGNOSIS — I10 ESSENTIAL HYPERTENSION: ICD-10-CM

## 2021-07-29 DIAGNOSIS — E11.65 TYPE 2 DIABETES MELLITUS WITH HYPERGLYCEMIA, WITHOUT LONG-TERM CURRENT USE OF INSULIN (HCC): ICD-10-CM

## 2021-07-29 RX ORDER — LISINOPRIL AND HYDROCHLOROTHIAZIDE 25; 20 MG/1; MG/1
1 TABLET ORAL DAILY
Qty: 90 TABLET | Refills: 3 | Status: SHIPPED | OUTPATIENT
Start: 2021-07-29

## 2021-07-29 RX ORDER — GLYBURIDE-METFORMIN HYDROCHLORIDE 5; 500 MG/1; MG/1
TABLET ORAL
Qty: 90 TABLET | Refills: 3 | Status: SHIPPED | OUTPATIENT
Start: 2021-07-29

## 2021-07-29 RX ORDER — ATORVASTATIN CALCIUM 20 MG/1
TABLET, FILM COATED ORAL
Qty: 90 TABLET | Refills: 3 | Status: SHIPPED | OUTPATIENT
Start: 2021-07-29

## 2021-08-10 NOTE — PROGRESS NOTES
Subjective:     CC:     HPI:   Christ presents today to discuss the following issues:        Past Medical History:   Diagnosis Date   • Acute midline low back pain with left-sided sciatica 9/3/2020   • Anxiety 2020   • Diabetes (HCC)    • Hypertension     first  discovered    • Obesities, morbid (HCC)     since childhood   • JAYLIN (obstructive sleep apnea) 2020   • Smoking 7/10/2014   • Stroke (HCC)        Social History     Tobacco Use   • Smoking status: Former Smoker     Packs/day: 0.50     Years: 8.00     Pack years: 4.00     Types: Cigarettes     Quit date: 2020     Years since quittin.5   • Smokeless tobacco: Never Used   Vaping Use   • Vaping Use: Unknown   Substance Use Topics   • Alcohol use: No   • Drug use: Not Currently     Comment: smokes sarai        Current Outpatient Medications Ordered in Epic   Medication Sig Dispense Refill   • atorvastatin (LIPITOR) 20 MG Tab Take 1 tablet by mouth nightly 90 tablet 3   • glyBURIDE-metFORMIN (GLUCOVANCE) 5-500 MG Tab TAKE 1 TABLET BY MOUTH IN THE MORNING WITH BREAKFAST 90 tablet 3   • lisinopril-hydrochlorothiazide (PRINZIDE) 20-25 MG per tablet Take 1 tablet by mouth every day. 90 tablet 3   • warfarin (COUMADIN) 5 MG Tab Patient to take 10mg on Mon & Wed and 5mg ROW or as directed by the [facility] 60 tablet 2   • hydrocortisone (ANUSOL-HC) 25 MG Suppos Insert 1 Suppository into the rectum every 12 hours. 100 Suppository 3   • aspirin (ASA) 81 MG Chew Tab chewable tablet Take 1 Tab by mouth every day. 100 Tab 5     No current Epic-ordered facility-administered medications on file.       Allergies:  Patient has no allergy information on record.    Health Maintenance: Completed    ROS:   Denies any recent fevers or chills. No nausea or vomiting. No chest pains or shortness of breath.    Objective:       Exam:  There were no vitals taken for this visit. There is no height or weight on file to calculate BMI.    Gen: Alert and  oriented, No apparent distress.  Lungs: Normal effort, CTA bilaterally, no wheezes, rhonchi, or rales  CV: Regular rate and rhythm. No murmurs, rubs, or gallops.  Ext: No clubbing, cyanosis, edema.      Assessment & Plan:     36 y.o. male with the following -     Last seen 6/8/2021, did not get labs    Type 2 diabetes mellitus without complication, without long-term current use of insulin (HCC)   This is a chronic condition.  The patient is on glyburide-Metformin (Glucovance) 5-500 mg daily.  Hemoglobin A1c from 6/8/2020 was 7.7.  He had a normal microalbumin creatinine ratio 14 on 6/8/2020.  He does not monitor his glucose values at home.  Labs pending.  -Continue glyburide-metformin (Glucovance) 5-500 mg daily        Essential hypertension  This is a chronic condition.  The patient is on lisinopril-HCTZ 20-25 mg daily.  He does not monitor his BP at home.  BP is normal at today's visit.  -Continue lisinopril-HCTZ 20-25 mg daily     Cerebrovascular accident (CVA) due to occlusion of cerebral artery (HCC)   Lupus anticoagulant positive   This is a chronic condition.    Stable.  The patient was admitted 1/29/2020 for possible seizure-like activity.  MRI on 1/30/2020 showed a 7 mm lesion in the left lateral occipital lobe-parietal lobe, likely an acute infarct as well as an additional left parietal lobe lesion representing likely acute infarction.  Subsequent hypercoagulability work-up outside of [facility] revealed that he was positive for antiphospholipid antibody syndrome.  He is currently on warfarin with a Lovenox bridge.  He is followed by the anticoagulation clinic.  Once he is stable on his warfarin he will be transitioned off of the Lovenox.  He will need lifelong anticoagulation due to his antiphospholipid antibody syndrome diagnosis.  He is also on aspirin 81 mg daily and lipid control with atorvastatin 20 mg nightly.  Lipid panel from 6/8/2020 showed a total cholesterol of 91, LDL 46, HDL 31, triglycerides  68.     Labs pending.  -Continue enoxaparin 150 mg every 12 hours  -Continue aspirin 81 mg daily  -Continue lipid control with atorvastatin 20 mg nightly     Hemorrhoids, unspecified hemorrhoid type  This is a chronic condition.  Progressive.  He is currently taking stool softeners.  Preparation H provided limited relief.  He is scheduled to see gastroenterology on 7/1/2021. Rectal exam deferred.   - hydrocortisone (ANUSOL-HC) 25 MG Suppos; Insert 1 Suppository into the rectum every 12 hours.  Dispense: 100 Suppository; Refill: 3     Chronic pain of left knee  This is an acute condition.    Improving.  No antecedent trauma.  Knee without erythema or swelling.  Patient reports intermittent posterior, anterior pain.  He is not able to take ibuprofen as he is on anticoagulation.  Left knee x-ray on 6/7/2021 showed mild degenerative changes with a questionable small suprapatellar bursa effusion.  -Continue to monitor, patient declined PT referral as pain is improving     NAFLD (nonalcoholic fatty liver disease)  This is a chronic condition.    Stable.  Abdominal ultrasound from 2014 showed hepatomegaly with fatty changes.  His most recent liver enzymes were normal.  Labs pending.  -Continue to monitor        Return in about 3 months (around 9/8/2021).    I spent a total of *** minutes with record review, exam, communication with the patient, communication with other providers, and documentation of this encounter.      No follow-ups on file.    Please note that this dictation was created using voice recognition software. I have made every reasonable attempt to correct obvious errors, but I expect that there are errors of grammar and possibly content that I did not discover before finalizing the note.

## 2021-08-11 ENCOUNTER — APPOINTMENT (OUTPATIENT)
Dept: MEDICAL GROUP | Facility: PHYSICIAN GROUP | Age: 36
End: 2021-08-11
Payer: COMMERCIAL

## 2021-08-16 ENCOUNTER — HOSPITAL ENCOUNTER (OUTPATIENT)
Dept: LAB | Facility: MEDICAL CENTER | Age: 36
End: 2021-08-16
Attending: NURSE PRACTITIONER
Payer: COMMERCIAL

## 2021-08-16 ENCOUNTER — HOSPITAL ENCOUNTER (OUTPATIENT)
Dept: LAB | Facility: MEDICAL CENTER | Age: 36
End: 2021-08-16
Attending: INTERNAL MEDICINE
Payer: COMMERCIAL

## 2021-08-16 DIAGNOSIS — E11.9 TYPE 2 DIABETES MELLITUS WITHOUT COMPLICATION, WITHOUT LONG-TERM CURRENT USE OF INSULIN (HCC): ICD-10-CM

## 2021-08-16 DIAGNOSIS — I10 ESSENTIAL HYPERTENSION: ICD-10-CM

## 2021-08-16 DIAGNOSIS — I63.50 CEREBROVASCULAR ACCIDENT (CVA) DUE TO OCCLUSION OF CEREBRAL ARTERY (HCC): ICD-10-CM

## 2021-08-16 LAB
ALBUMIN SERPL BCP-MCNC: 3.5 G/DL (ref 3.2–4.9)
ALBUMIN SERPL BCP-MCNC: 3.6 G/DL (ref 3.2–4.9)
ALBUMIN/GLOB SERPL: 0.9 G/DL
ALBUMIN/GLOB SERPL: 0.9 G/DL
ALP SERPL-CCNC: 74 U/L (ref 30–99)
ALP SERPL-CCNC: 75 U/L (ref 30–99)
ALT SERPL-CCNC: 20 U/L (ref 2–50)
ALT SERPL-CCNC: 20 U/L (ref 2–50)
ANION GAP SERPL CALC-SCNC: 14 MMOL/L (ref 7–16)
ANION GAP SERPL CALC-SCNC: 14 MMOL/L (ref 7–16)
AST SERPL-CCNC: 19 U/L (ref 12–45)
AST SERPL-CCNC: 20 U/L (ref 12–45)
BASOPHILS # BLD AUTO: 0.5 % (ref 0–1.8)
BASOPHILS # BLD: 0.04 K/UL (ref 0–0.12)
BILIRUB SERPL-MCNC: 1.1 MG/DL (ref 0.1–1.5)
BILIRUB SERPL-MCNC: 1.2 MG/DL (ref 0.1–1.5)
BUN SERPL-MCNC: 13 MG/DL (ref 8–22)
BUN SERPL-MCNC: 13 MG/DL (ref 8–22)
CALCIUM SERPL-MCNC: 9.1 MG/DL (ref 8.5–10.5)
CALCIUM SERPL-MCNC: 9.1 MG/DL (ref 8.5–10.5)
CHLORIDE SERPL-SCNC: 99 MMOL/L (ref 96–112)
CHLORIDE SERPL-SCNC: 99 MMOL/L (ref 96–112)
CHOLEST SERPL-MCNC: 92 MG/DL (ref 100–199)
CO2 SERPL-SCNC: 23 MMOL/L (ref 20–33)
CO2 SERPL-SCNC: 23 MMOL/L (ref 20–33)
CREAT SERPL-MCNC: 1.09 MG/DL (ref 0.5–1.4)
CREAT SERPL-MCNC: 1.1 MG/DL (ref 0.5–1.4)
CREAT UR-MCNC: 183.21 MG/DL
CRP SERPL HS-MCNC: 2.46 MG/DL (ref 0–0.75)
EOSINOPHIL # BLD AUTO: 0.08 K/UL (ref 0–0.51)
EOSINOPHIL NFR BLD: 1 % (ref 0–6.9)
ERYTHROCYTE [DISTWIDTH] IN BLOOD BY AUTOMATED COUNT: 41.8 FL (ref 35.9–50)
ERYTHROCYTE [DISTWIDTH] IN BLOOD BY AUTOMATED COUNT: 42.2 FL (ref 35.9–50)
EST. AVERAGE GLUCOSE BLD GHB EST-MCNC: 180 MG/DL
FASTING STATUS PATIENT QL REPORTED: NORMAL
FASTING STATUS PATIENT QL REPORTED: NORMAL
GLOBULIN SER CALC-MCNC: 3.9 G/DL (ref 1.9–3.5)
GLOBULIN SER CALC-MCNC: 4 G/DL (ref 1.9–3.5)
GLUCOSE SERPL-MCNC: 154 MG/DL (ref 65–99)
GLUCOSE SERPL-MCNC: 155 MG/DL (ref 65–99)
HBA1C MFR BLD: 7.9 % (ref 4–5.6)
HCT VFR BLD AUTO: 42.6 % (ref 42–52)
HCT VFR BLD AUTO: 44.2 % (ref 42–52)
HDLC SERPL-MCNC: 31 MG/DL
HGB BLD-MCNC: 14.1 G/DL (ref 14–18)
HGB BLD-MCNC: 14.1 G/DL (ref 14–18)
IMM GRANULOCYTES # BLD AUTO: 0.07 K/UL (ref 0–0.11)
IMM GRANULOCYTES NFR BLD AUTO: 0.9 % (ref 0–0.9)
LDLC SERPL CALC-MCNC: 41 MG/DL
LYMPHOCYTES # BLD AUTO: 1.54 K/UL (ref 1–4.8)
LYMPHOCYTES NFR BLD: 19.6 % (ref 22–41)
MCH RBC QN AUTO: 27.4 PG (ref 27–33)
MCH RBC QN AUTO: 27.9 PG (ref 27–33)
MCHC RBC AUTO-ENTMCNC: 31.9 G/DL (ref 33.7–35.3)
MCHC RBC AUTO-ENTMCNC: 33.1 G/DL (ref 33.7–35.3)
MCV RBC AUTO: 84.4 FL (ref 81.4–97.8)
MCV RBC AUTO: 86 FL (ref 81.4–97.8)
MICROALBUMIN UR-MCNC: <1.2 MG/DL
MICROALBUMIN/CREAT UR: NORMAL MG/G (ref 0–30)
MONOCYTES # BLD AUTO: 0.51 K/UL (ref 0–0.85)
MONOCYTES NFR BLD AUTO: 6.5 % (ref 0–13.4)
NEUTROPHILS # BLD AUTO: 5.6 K/UL (ref 1.82–7.42)
NEUTROPHILS NFR BLD: 71.5 % (ref 44–72)
NRBC # BLD AUTO: 0 K/UL
NRBC BLD-RTO: 0 /100 WBC
PLATELET # BLD AUTO: 217 K/UL (ref 164–446)
PLATELET # BLD AUTO: 229 K/UL (ref 164–446)
PMV BLD AUTO: 9.7 FL (ref 9–12.9)
PMV BLD AUTO: 9.8 FL (ref 9–12.9)
POTASSIUM SERPL-SCNC: 4.3 MMOL/L (ref 3.6–5.5)
POTASSIUM SERPL-SCNC: 4.5 MMOL/L (ref 3.6–5.5)
PROT SERPL-MCNC: 7.5 G/DL (ref 6–8.2)
PROT SERPL-MCNC: 7.5 G/DL (ref 6–8.2)
RBC # BLD AUTO: 5.05 M/UL (ref 4.7–6.1)
RBC # BLD AUTO: 5.14 M/UL (ref 4.7–6.1)
SODIUM SERPL-SCNC: 136 MMOL/L (ref 135–145)
SODIUM SERPL-SCNC: 136 MMOL/L (ref 135–145)
TRIGL SERPL-MCNC: 100 MG/DL (ref 0–149)
WBC # BLD AUTO: 7.6 K/UL (ref 4.8–10.8)
WBC # BLD AUTO: 7.8 K/UL (ref 4.8–10.8)

## 2021-08-16 PROCEDURE — 85025 COMPLETE CBC W/AUTO DIFF WBC: CPT

## 2021-08-16 PROCEDURE — 86140 C-REACTIVE PROTEIN: CPT

## 2021-08-16 PROCEDURE — 83036 HEMOGLOBIN GLYCOSYLATED A1C: CPT

## 2021-08-16 PROCEDURE — 80061 LIPID PANEL: CPT

## 2021-08-16 PROCEDURE — 82570 ASSAY OF URINE CREATININE: CPT

## 2021-08-16 PROCEDURE — 80053 COMPREHEN METABOLIC PANEL: CPT | Mod: 91

## 2021-08-16 PROCEDURE — 80053 COMPREHEN METABOLIC PANEL: CPT

## 2021-08-16 PROCEDURE — 82043 UR ALBUMIN QUANTITATIVE: CPT

## 2021-08-16 PROCEDURE — 36415 COLL VENOUS BLD VENIPUNCTURE: CPT

## 2021-08-16 PROCEDURE — 85027 COMPLETE CBC AUTOMATED: CPT

## 2021-08-17 ENCOUNTER — HOSPITAL ENCOUNTER (OUTPATIENT)
Facility: MEDICAL CENTER | Age: 36
End: 2021-08-17
Attending: NURSE PRACTITIONER
Payer: COMMERCIAL

## 2021-08-17 PROCEDURE — 83993 ASSAY FOR CALPROTECTIN FECAL: CPT

## 2021-08-19 ENCOUNTER — ANTICOAGULATION VISIT (OUTPATIENT)
Dept: VASCULAR LAB | Facility: MEDICAL CENTER | Age: 36
End: 2021-08-19
Attending: INTERNAL MEDICINE
Payer: COMMERCIAL

## 2021-08-19 DIAGNOSIS — I63.519 CEREBROVASCULAR ACCIDENT (CVA) DUE TO OCCLUSION OF MIDDLE CEREBRAL ARTERY, UNSPECIFIED BLOOD VESSEL LATERALITY (HCC): ICD-10-CM

## 2021-08-19 DIAGNOSIS — R76.0 LUPUS ANTICOAGULANT POSITIVE: ICD-10-CM

## 2021-08-19 LAB
CALPROTECTIN STL-MCNT: 49 UG/G
INR BLD: 1.8 (ref 0.9–1.2)
INR PPP: 1.8 (ref 2–3.5)

## 2021-08-19 PROCEDURE — 85610 PROTHROMBIN TIME: CPT

## 2021-08-19 PROCEDURE — 99212 OFFICE O/P EST SF 10 MIN: CPT | Performed by: NURSE PRACTITIONER

## 2021-08-19 NOTE — PROGRESS NOTES
Anticoagulation Summary  As of 2021    INR goal:  2.0-3.0   TTR:  52.5 % (11.8 mo)   INR used for dosin.80 (2021)   Warfarin maintenance plan:  5 mg (5 mg x 1) every day   Weekly warfarin total:  35 mg   Plan last modified:  SABINA Alvarez (2021)   Next INR check:  2021   Target end date:  Indefinite    Indications    Lupus anticoagulant positive [R76.0]  Cerebrovascular accident (CVA) due to vascular occlusion (HCC) [I63.9]             Anticoagulation Episode Summary     INR check location:      Preferred lab:      Send INR reminders to:      Comments:        Anticoagulation Care Providers     Provider Role Specialty Phone number    Aayush Parra M.D. Referring Oncology [phone]    [facility] Responsible  [phone]    Julio Gibbons, PharmD Responsible                      Refer to Patient Findings for HPI:      Verified current warfarin dosing schedule.    He received a call from [employer] but thought it was a scam and turned it down. I gave him [employer]' phone number. He will call them to see if he qualifies for a home monitor and let us know.  Medications reconciled   Pt is on ASA 81 mg as antiplatelet therapy for CVA and must be reviewed again on with neurology.      A/P   INR  sub-therapeutic. No recent VTEs.    Warfarin dosing recommendation: Increase tonight's dose then continue current regimen.    Pt educated to contact our clinic with any changes in medications or s/s of bleeding or thrombosis. Pt is aware to seek immediate medical attention for falls, head injury or deep cuts.    Follow up appointment in 4 week(s) per pt's request.    SABINA Alvarez

## 2021-08-25 DIAGNOSIS — Z79.01 CHRONIC ANTICOAGULATION: ICD-10-CM

## 2021-09-15 ENCOUNTER — DOCUMENTATION (OUTPATIENT)
Dept: VASCULAR LAB | Facility: MEDICAL CENTER | Age: 36
End: 2021-09-15

## 2021-09-15 NOTE — PROGRESS NOTES
[facility]    Received anticoagulation clearance from [facility] regarding upcoming sigmoidoscopy.    Procedure date 10/12/21    Pt is on warfarin for hx of CVA and lupus anticoagualtion.  D/t hx, bridging with Lovenox is indicated    Will d/w pt closer to procedure date.  Pt has f/u scheduled for 9/16    Faxed clearance to [phone]; (will send to [place] to scan in media)    Eloisa Snyder, ArmandoD

## 2021-09-16 ENCOUNTER — ANTICOAGULATION VISIT (OUTPATIENT)
Dept: VASCULAR LAB | Facility: MEDICAL CENTER | Age: 36
End: 2021-09-16
Attending: INTERNAL MEDICINE
Payer: COMMERCIAL

## 2021-09-16 VITALS — DIASTOLIC BLOOD PRESSURE: 86 MMHG | HEART RATE: 84 BPM | SYSTOLIC BLOOD PRESSURE: 126 MMHG

## 2021-09-16 DIAGNOSIS — R76.0 LUPUS ANTICOAGULANT POSITIVE: ICD-10-CM

## 2021-09-16 LAB — INR PPP: 1.9 (ref 2–3.5)

## 2021-09-16 PROCEDURE — 99212 OFFICE O/P EST SF 10 MIN: CPT

## 2021-09-16 PROCEDURE — 85610 PROTHROMBIN TIME: CPT

## 2021-09-16 NOTE — PROGRESS NOTES
Anticoagulation Summary  As of 2021    INR goal:  2.0-3.0   TTR:  48.6 % (1 y)   INR used for dosin.90 (2021)   Warfarin maintenance plan:  7.5 mg (5 mg x 1.5) every Thu; 5 mg (5 mg x 1) all other days   Weekly warfarin total:  37.5 mg   Plan last modified:  Eloisa Snyder, PharmD (2021)   Next INR check:  2021   Target end date:  Indefinite    Indications    Lupus anticoagulant positive [R76.0]  Cerebrovascular accident (CVA) due to vascular occlusion (HCC) [I63.9]             Anticoagulation Episode Summary     INR check location:      Preferred lab:      Send INR reminders to:      Comments:        Anticoagulation Care Providers     Provider Role Specialty Phone number    Aayush Parra M.D. Referring Medical Oncology [phone]    [facility] Responsible  [phone]    Armando MarshallD Responsible                  Refer to Patient Findings for HPI:  Patient Findings     Positives:  Missed doses, Extra doses    Negatives:  Signs/symptoms of thrombosis, Signs/symptoms of bleeding, Laboratory test error suspected, Change in health, Change in alcohol use, Change in activity, Upcoming invasive procedure, Emergency department visit, Upcoming dental procedure, Change in medications, Change in diet/appetite, Hospital admission, Bruising, Other complaints          Vitals:    21 1138   BP: 126/86   Pulse: 84         Verified current warfarin dosing schedule.    Medications reconciled   Pt is on ASA 81 mg as antiplatelet therapy for CVA and must be reviewed again on with neurology.         A/P   INR  sub-therapeutic.     Warfarin dosing recommendation: Pt to increase weekly warfarin regimen as stated above.    Pt educated to contact our clinic with any changes in medications or s/s of bleeding or thrombosis. Pt is aware to seek immediate medical attention for falls, head injury or deep cuts.    Follow up appointment in 2 week(s).    Patricia Arredondo, Pharmacy  Intern  Eloisa Snyder, PharmD

## 2021-09-17 LAB — INR BLD: 1.9 (ref 0.9–1.2)

## 2021-09-28 ENCOUNTER — PRE-ADMISSION TESTING (OUTPATIENT)
Dept: ADMISSIONS | Facility: MEDICAL CENTER | Age: 36
End: 2021-09-28
Attending: STUDENT IN AN ORGANIZED HEALTH CARE EDUCATION/TRAINING PROGRAM
Payer: COMMERCIAL

## 2021-09-28 DIAGNOSIS — Z01.812 PRE-OPERATIVE LABORATORY EXAMINATION: ICD-10-CM

## 2021-09-28 DIAGNOSIS — Z01.810 PRE-OPERATIVE CARDIOVASCULAR EXAMINATION: ICD-10-CM

## 2021-09-28 LAB
ANION GAP SERPL CALC-SCNC: 10 MMOL/L (ref 7–16)
BUN SERPL-MCNC: 18 MG/DL (ref 8–22)
CALCIUM SERPL-MCNC: 9.3 MG/DL (ref 8.5–10.5)
CHLORIDE SERPL-SCNC: 99 MMOL/L (ref 96–112)
CO2 SERPL-SCNC: 25 MMOL/L (ref 20–33)
CREAT SERPL-MCNC: 0.98 MG/DL (ref 0.5–1.4)
EKG IMPRESSION: NORMAL
ERYTHROCYTE [DISTWIDTH] IN BLOOD BY AUTOMATED COUNT: 43.9 FL (ref 35.9–50)
EST. AVERAGE GLUCOSE BLD GHB EST-MCNC: 194 MG/DL
GLUCOSE SERPL-MCNC: 201 MG/DL (ref 65–99)
HBA1C MFR BLD: 8.4 % (ref 4–5.6)
HCT VFR BLD AUTO: 43.3 % (ref 42–52)
HGB BLD-MCNC: 14.2 G/DL (ref 14–18)
MCH RBC QN AUTO: 27.8 PG (ref 27–33)
MCHC RBC AUTO-ENTMCNC: 32.8 G/DL (ref 33.7–35.3)
MCV RBC AUTO: 84.7 FL (ref 81.4–97.8)
PLATELET # BLD AUTO: 243 K/UL (ref 164–446)
PMV BLD AUTO: 9.8 FL (ref 9–12.9)
POTASSIUM SERPL-SCNC: 4.1 MMOL/L (ref 3.6–5.5)
RBC # BLD AUTO: 5.11 M/UL (ref 4.7–6.1)
SODIUM SERPL-SCNC: 134 MMOL/L (ref 135–145)
WBC # BLD AUTO: 6.7 K/UL (ref 4.8–10.8)

## 2021-09-28 PROCEDURE — 36415 COLL VENOUS BLD VENIPUNCTURE: CPT

## 2021-09-28 PROCEDURE — 85027 COMPLETE CBC AUTOMATED: CPT

## 2021-09-28 PROCEDURE — 93010 ELECTROCARDIOGRAM REPORT: CPT | Performed by: INTERNAL MEDICINE

## 2021-09-28 PROCEDURE — 80048 BASIC METABOLIC PNL TOTAL CA: CPT

## 2021-09-28 PROCEDURE — 83036 HEMOGLOBIN GLYCOSYLATED A1C: CPT

## 2021-09-28 PROCEDURE — 93005 ELECTROCARDIOGRAM TRACING: CPT

## 2021-09-28 RX ORDER — ACETAMINOPHEN 325 MG/1
650 TABLET ORAL
COMMUNITY

## 2021-09-28 ASSESSMENT — FIBROSIS 4 INDEX: FIB4 SCORE: 0.7

## 2021-09-30 ENCOUNTER — ANTICOAGULATION VISIT (OUTPATIENT)
Dept: VASCULAR LAB | Facility: MEDICAL CENTER | Age: 36
End: 2021-09-30
Attending: INTERNAL MEDICINE
Payer: COMMERCIAL

## 2021-09-30 DIAGNOSIS — R76.0 LUPUS ANTICOAGULANT POSITIVE: ICD-10-CM

## 2021-09-30 DIAGNOSIS — I63.81 CEREBROVASCULAR ACCIDENT (CVA) DUE TO OCCLUSION OF SMALL ARTERY (HCC): ICD-10-CM

## 2021-09-30 LAB — INR PPP: 1.9 (ref 2–3.5)

## 2021-09-30 PROCEDURE — 85610 PROTHROMBIN TIME: CPT

## 2021-09-30 PROCEDURE — 99213 OFFICE O/P EST LOW 20 MIN: CPT | Performed by: NURSE PRACTITIONER

## 2021-09-30 NOTE — PROGRESS NOTES
Anticoagulation Summary  As of 2021    INR goal:  2.0-3.0   TTR:  46.9 % (1.1 y)   INR used for dosin.90 (2021)   Warfarin maintenance plan:  10 mg (5 mg x 2) every Thu; 5 mg (5 mg x 1) all other days   Weekly warfarin total:  40 mg   Plan last modified:  SABINA Alvarez (2021)   Next INR check:  10/15/2021   Target end date:  Indefinite    Indications    Lupus anticoagulant positive [R76.0]  Cerebrovascular accident (CVA) due to vascular occlusion (HCC) [I63.9]             Anticoagulation Episode Summary     INR check location:      Preferred lab:      Send INR reminders to:      Comments:        Anticoagulation Care Providers     Provider Role Specialty Phone number    Aayush Parra M.D. Referring Medical Oncology [phone]    [facility] Responsible  [phone]    Julio Gibbons, PharmD Responsible                  Refer to Patient Findings for HPI:  Patient Findings     Comments:  Scheduled for sigmoidoscopy on 10/12/21. He also sees Dr Mcdonald on 10/18/21 - he's not sure if he will be having another surgery but he will let us know.          Verified current warfarin dosing schedule.    Medications reconciled   Pt is on ASA 81 mg as antiplatelet therapy for CVA and must be reviewed again on - indefinitely.      A/P   INR  slightly sub-therapeutic. INR trending a little low.    Warfarin dosing recommendation: Take 10 mg on Thursday, 5 mg AODs.    Pt to have procedure on 10/18/21. Due to pt history lovenox bridging is indicated.   Pt to follow instructions as below, after procedure to ask operating MD when safe to resume anticoagulation, if no instructions given, pt will follow as below    CHADSvasc n/a  Clot history - hx of CVA, lupus anticoagulant    Current weight 183 kg  CrCl = ~263 ml/min  Lab Results   Component Value Date/Time    BUN 18 2021 10:21 AM    CREATININE 0.98 2021 10:21 AM        Used Lovenox before yes        Date  Warfarin dosing  AM Lovenox PM Lovenox   5 Days before procedure 10/7/21 0mg NONE NONE   4 Days before procedure 10/8/21 0mg Lovenox injection Lovenox injection   3 Days before procedure 10/9/21 0mg Lovenox injection Lovenox injection   2 Days before procedure 10/10/21 0mg Lovenox injection Lovenox injection   1 Days before procedure 10/11/21 0mg NONE NONE   PROCEDURE 10/12/21 10 mg NONE NONE   1 Day after procedure 10/13/21 10 mg Restart Lovenox injections    Lovenox injection   2 Days after procedure 10/14/21 10 mg Lovenox injection Lovenox injection   3 Days after procedure 10/15/21 10 mg Lovenox injection Lovenox injection   4 Days after procedure 10/16/21 5 mg Lovenox injection Lovenox injection   5 Days after procedure 10/17/21 5 mg Lovenox injection Lovenox injection   5 Days after procedure 10/18/21  GET INR checked              Pt educated to contact our clinic with any changes in medications or s/s of bleeding or thrombosis. Pt is aware to seek immediate medical attention for falls, head injury or deep cuts.    Follow up appointment in 2 week(s).    MAXX Alvarez.

## 2021-09-30 NOTE — PATIENT INSTRUCTIONS
Date  Warfarin dosing AM Lovenox PM Lovenox   5 Days before procedure 10/7/21 0mg NONE NONE   4 Days before procedure 10/8/21 0mg Lovenox injection Lovenox injection   3 Days before procedure 10/9/21 0mg Lovenox injection Lovenox injection   2 Days before procedure 10/10/21 0mg Lovenox injection Lovenox injection   1 Days before procedure 10/11/21 0mg NONE NONE   PROCEDURE 10/12/21 10 mg NONE NONE   1 Day after procedure 10/13/21 10 mg Restart Lovenox injections    Lovenox injection   2 Days after procedure 10/14/21 10 mg Lovenox injection Lovenox injection   3 Days after procedure 10/15/21 10 mg Lovenox injection Lovenox injection   4 Days after procedure 10/16/21 5 mg Lovenox injection Lovenox injection   5 Days after procedure 10/17/21 5 mg Lovenox injection Lovenox injection   5 Days after procedure 10/18/21  GET INR checked

## 2021-10-01 ENCOUNTER — TELEPHONE (OUTPATIENT)
Dept: VASCULAR LAB | Facility: MEDICAL CENTER | Age: 36
End: 2021-10-01

## 2021-10-01 LAB — INR BLD: 1.9 (ref 0.9–1.2)

## 2021-10-01 NOTE — TELEPHONE ENCOUNTER
Spoke with Dr Mcdonald's office. Let him know he has an initial consultation scheduled 10/18 and not an actual surgery scheduled that day.     Also he took 7.5 mg of warfarin last evening instead of 10 mg. Instructed to take 7.5 mg tonight then resume previously instructed regimen.    Will f/u with pt on 10/18/21 in [facility]. Encouraged to call [phone] if he has any questions/concerns.    China MARLOW

## 2021-10-12 ENCOUNTER — ANESTHESIA (OUTPATIENT)
Dept: SURGERY | Facility: MEDICAL CENTER | Age: 36
End: 2021-10-12
Payer: COMMERCIAL

## 2021-10-12 ENCOUNTER — ANESTHESIA EVENT (OUTPATIENT)
Dept: SURGERY | Facility: MEDICAL CENTER | Age: 36
End: 2021-10-12
Payer: COMMERCIAL

## 2021-10-12 ENCOUNTER — HOSPITAL ENCOUNTER (OUTPATIENT)
Facility: MEDICAL CENTER | Age: 36
End: 2021-10-12
Attending: STUDENT IN AN ORGANIZED HEALTH CARE EDUCATION/TRAINING PROGRAM | Admitting: STUDENT IN AN ORGANIZED HEALTH CARE EDUCATION/TRAINING PROGRAM
Payer: COMMERCIAL

## 2021-10-12 VITALS
WEIGHT: 315 LBS | RESPIRATION RATE: 18 BRPM | SYSTOLIC BLOOD PRESSURE: 121 MMHG | BODY MASS INDEX: 45.1 KG/M2 | OXYGEN SATURATION: 99 % | DIASTOLIC BLOOD PRESSURE: 75 MMHG | HEIGHT: 70 IN | HEART RATE: 101 BPM | TEMPERATURE: 96.9 F

## 2021-10-12 LAB
EXTERNAL QUALITY CONTROL: NORMAL
GLUCOSE BLD-MCNC: 182 MG/DL (ref 65–99)
SARS-COV+SARS-COV-2 AG RESP QL IA.RAPID: NEGATIVE

## 2021-10-12 PROCEDURE — 87426 SARSCOV CORONAVIRUS AG IA: CPT | Performed by: STUDENT IN AN ORGANIZED HEALTH CARE EDUCATION/TRAINING PROGRAM

## 2021-10-12 PROCEDURE — 160009 HCHG ANES TIME/MIN: Performed by: STUDENT IN AN ORGANIZED HEALTH CARE EDUCATION/TRAINING PROGRAM

## 2021-10-12 PROCEDURE — 160025 RECOVERY II MINUTES (STATS): Performed by: STUDENT IN AN ORGANIZED HEALTH CARE EDUCATION/TRAINING PROGRAM

## 2021-10-12 PROCEDURE — 160035 HCHG PACU - 1ST 60 MINS PHASE I: Performed by: STUDENT IN AN ORGANIZED HEALTH CARE EDUCATION/TRAINING PROGRAM

## 2021-10-12 PROCEDURE — 160048 HCHG OR STATISTICAL LEVEL 1-5: Performed by: STUDENT IN AN ORGANIZED HEALTH CARE EDUCATION/TRAINING PROGRAM

## 2021-10-12 PROCEDURE — 700105 HCHG RX REV CODE 258: Performed by: STUDENT IN AN ORGANIZED HEALTH CARE EDUCATION/TRAINING PROGRAM

## 2021-10-12 PROCEDURE — 82962 GLUCOSE BLOOD TEST: CPT

## 2021-10-12 PROCEDURE — 160046 HCHG PACU - 1ST 60 MINS PHASE II: Performed by: STUDENT IN AN ORGANIZED HEALTH CARE EDUCATION/TRAINING PROGRAM

## 2021-10-12 PROCEDURE — 700111 HCHG RX REV CODE 636 W/ 250 OVERRIDE (IP): Performed by: ANESTHESIOLOGY

## 2021-10-12 PROCEDURE — 160002 HCHG RECOVERY MINUTES (STAT): Performed by: STUDENT IN AN ORGANIZED HEALTH CARE EDUCATION/TRAINING PROGRAM

## 2021-10-12 PROCEDURE — 160201 HCHG ENDO MINUTES - 1ST 30 MINS LEVEL 2: Performed by: STUDENT IN AN ORGANIZED HEALTH CARE EDUCATION/TRAINING PROGRAM

## 2021-10-12 PROCEDURE — 700101 HCHG RX REV CODE 250: Performed by: ANESTHESIOLOGY

## 2021-10-12 RX ORDER — OXYCODONE HCL 5 MG/5 ML
5 SOLUTION, ORAL ORAL
Status: DISCONTINUED | OUTPATIENT
Start: 2021-10-12 | End: 2021-10-12 | Stop reason: HOSPADM

## 2021-10-12 RX ORDER — DIPHENHYDRAMINE HYDROCHLORIDE 50 MG/ML
12.5 INJECTION INTRAMUSCULAR; INTRAVENOUS
Status: DISCONTINUED | OUTPATIENT
Start: 2021-10-12 | End: 2021-10-12 | Stop reason: HOSPADM

## 2021-10-12 RX ORDER — ONDANSETRON 2 MG/ML
4 INJECTION INTRAMUSCULAR; INTRAVENOUS
Status: DISCONTINUED | OUTPATIENT
Start: 2021-10-12 | End: 2021-10-12 | Stop reason: HOSPADM

## 2021-10-12 RX ORDER — LIDOCAINE HYDROCHLORIDE 20 MG/ML
INJECTION, SOLUTION EPIDURAL; INFILTRATION; INTRACAUDAL; PERINEURAL PRN
Status: DISCONTINUED | OUTPATIENT
Start: 2021-10-12 | End: 2021-10-12 | Stop reason: SURG

## 2021-10-12 RX ORDER — HYDROMORPHONE HYDROCHLORIDE 1 MG/ML
0.2 INJECTION, SOLUTION INTRAMUSCULAR; INTRAVENOUS; SUBCUTANEOUS
Status: DISCONTINUED | OUTPATIENT
Start: 2021-10-12 | End: 2021-10-12 | Stop reason: HOSPADM

## 2021-10-12 RX ORDER — SODIUM CHLORIDE, SODIUM LACTATE, POTASSIUM CHLORIDE, CALCIUM CHLORIDE 600; 310; 30; 20 MG/100ML; MG/100ML; MG/100ML; MG/100ML
INJECTION, SOLUTION INTRAVENOUS CONTINUOUS
Status: DISCONTINUED | OUTPATIENT
Start: 2021-10-12 | End: 2021-10-12 | Stop reason: HOSPADM

## 2021-10-12 RX ORDER — HALOPERIDOL 5 MG/ML
1 INJECTION INTRAMUSCULAR
Status: DISCONTINUED | OUTPATIENT
Start: 2021-10-12 | End: 2021-10-12 | Stop reason: HOSPADM

## 2021-10-12 RX ORDER — MEPERIDINE HYDROCHLORIDE 25 MG/ML
12.5 INJECTION INTRAMUSCULAR; INTRAVENOUS; SUBCUTANEOUS
Status: DISCONTINUED | OUTPATIENT
Start: 2021-10-12 | End: 2021-10-12 | Stop reason: HOSPADM

## 2021-10-12 RX ORDER — HYDROMORPHONE HYDROCHLORIDE 1 MG/ML
0.4 INJECTION, SOLUTION INTRAMUSCULAR; INTRAVENOUS; SUBCUTANEOUS
Status: DISCONTINUED | OUTPATIENT
Start: 2021-10-12 | End: 2021-10-12 | Stop reason: HOSPADM

## 2021-10-12 RX ORDER — MIDAZOLAM HYDROCHLORIDE 1 MG/ML
1 INJECTION INTRAMUSCULAR; INTRAVENOUS
Status: DISCONTINUED | OUTPATIENT
Start: 2021-10-12 | End: 2021-10-12 | Stop reason: HOSPADM

## 2021-10-12 RX ORDER — SODIUM CHLORIDE, SODIUM GLUCONATE, SODIUM ACETATE, POTASSIUM CHLORIDE AND MAGNESIUM CHLORIDE 526; 502; 368; 37; 30 MG/100ML; MG/100ML; MG/100ML; MG/100ML; MG/100ML
500 INJECTION, SOLUTION INTRAVENOUS CONTINUOUS
Status: DISCONTINUED | OUTPATIENT
Start: 2021-10-12 | End: 2021-10-12 | Stop reason: HOSPADM

## 2021-10-12 RX ORDER — IPRATROPIUM BROMIDE AND ALBUTEROL SULFATE 2.5; .5 MG/3ML; MG/3ML
3 SOLUTION RESPIRATORY (INHALATION)
Status: DISCONTINUED | OUTPATIENT
Start: 2021-10-12 | End: 2021-10-12 | Stop reason: HOSPADM

## 2021-10-12 RX ORDER — OXYCODONE HCL 5 MG/5 ML
10 SOLUTION, ORAL ORAL
Status: DISCONTINUED | OUTPATIENT
Start: 2021-10-12 | End: 2021-10-12 | Stop reason: HOSPADM

## 2021-10-12 RX ORDER — HYDROMORPHONE HYDROCHLORIDE 1 MG/ML
1 INJECTION, SOLUTION INTRAMUSCULAR; INTRAVENOUS; SUBCUTANEOUS
Status: DISCONTINUED | OUTPATIENT
Start: 2021-10-12 | End: 2021-10-12 | Stop reason: HOSPADM

## 2021-10-12 RX ADMIN — SODIUM CHLORIDE, POTASSIUM CHLORIDE, SODIUM LACTATE AND CALCIUM CHLORIDE: 600; 310; 30; 20 INJECTION, SOLUTION INTRAVENOUS at 09:12

## 2021-10-12 RX ADMIN — LIDOCAINE HYDROCHLORIDE 50 MG: 20 INJECTION, SOLUTION EPIDURAL; INFILTRATION; INTRACAUDAL at 10:09

## 2021-10-12 RX ADMIN — PROPOFOL 40 MG: 10 INJECTION, EMULSION INTRAVENOUS at 10:12

## 2021-10-12 RX ADMIN — PROPOFOL 100 MG: 10 INJECTION, EMULSION INTRAVENOUS at 10:09

## 2021-10-12 ASSESSMENT — PAIN SCALES - GENERAL: PAIN_LEVEL: 0

## 2021-10-12 ASSESSMENT — FIBROSIS 4 INDEX: FIB4 SCORE: 0.66

## 2021-10-12 NOTE — DISCHARGE INSTRUCTIONS
FLEXIBLE SIGMOIDOSCOPY    1. If you received a barium enema, take a mild laxative such as dulcolax to clean out the barium.   2. Drink plenty of fluids. Eat a diet high in fiber; such foods as whole-grain breads, fresh fruit and vegetables, nuts are recommended.  3. You may notice a few drops of blood with your first bowel movement. If you develop any large amount of bleeding, black stools, a fever, or abdominal pain, call your doctor right away.   4. Dr. Lorenzo will call you for test results or send a copy in the mail   5. Don't drive or drink alcohol for 24 hours. The medication you received will make you too drowsy.   6. No heavy lifting, ASA products or ASA x 5 days         Continue diltiazem ointment 3 times a day for anal fissure and internal hemorrhoids.  Continue high-fiber diet and over-the-counter laxatives to promote soft, regular stools.  Keep appointment [facility] for October 18  May resume anticoagulation, follow-up with clinic on dosing

## 2021-10-12 NOTE — PROCEDURES
Patient Name: Christ Calixto    Colonoscopy Procedure Note  Date of Procedure: 10/12/2021  Attending Physician: Berhane Saravia M.D.        Indications: Rectal pain and possible rectal mass  Sedation: MAC    Procedure Details:    Colonoscopy  Pre-Anesthesia Assessment:  Prior to the procedure, a History and Physical was performed, and patient medications and allergies were reviewed. The patient’s tolerance of previous anesthesia was also reviewed. The risks and benefits of the procedure and the sedation options and risks were discussed with the patient including but not limited to infection, bleeding, aspiration, perforation, adverse medication reaction, missed diagnosis, and missed lesions. The patient verbalized understanding. All questions were answered, and informed consent was obtained.     Prior Anticoagulants: Patient has taken coumadin  ASA Grade Assessment: 3    After I obtained informed consent from the patient, the patient was placed in the left lateral position. Appropriate time-out protocol was followed: the correct patient, the correct procedure, and the correct equipment in the room were confirmed. Throughout the procedure, the patient’s blood pressure, pulse, and oxygen saturations were monitored continuously. Digital rectal exam was performed. The Olympus variable stiffness colonoscope was introduced through the anus and passed under direct vision. The scope was advanced to the distal transverse colon. The sigmoidoscopy was performed without difficulty. The patient tolerated the procedure well. The quality of the bowel preparation was fair as solid yellow debris that could not be completely suctioned was seen.  Findings and interventions were performed and documented below. The endoscope was then removed and the procedure was terminated. There were no immediate postoperative complications.        Findings and Impressions: Scope was advanced to 65 cm of insertion.  Yellow stool was seen in the  rectum.  Mucosa appeared normal from 65 cm down to the distal rectum.  Grade 2 internal hemorrhoids were seen.    On rectal exam, the patient had tenderness and yellow discharge.  His body habitus made  the clots quite difficult.  No obvious fistula was seen but the presence of yellow liquid which appeared to be pus raises concern for such.  There is also a posterior sentinel tag.    Neither on rectal exam nor sigmoidoscopy was a mass seen.  No biopsies were taken.      Recommendations: Continue diltiazem ointment 3 times a day for anal fissure and internal hemorrhoids.  Continue high-fiber diet and over-the-counter laxatives to promote soft, regular stools.  Recommend patient keep appointment with [facility] for October 18.  -May resume anticoagulation      This note was generated using voice recognition software which has a small chance of producing errors of grammar and possibly content. I have made every reasonable attempt to find and correct any obvious errors, but expect that some may not be found prior to finalization of this note    Berhane Saravia MD, [facility]  [facility]

## 2021-10-12 NOTE — H&P
Physician H&P    Patient ID:  Christ Calixto  [id number]  36 y.o. male  1985    History:  Primary Diagnosis: Anal mass    HPI:  36-year-old male with morbid obesity, history of stroke on Coumadin, sleep apnea, seizures, and diabetes who was seen in clinic for follow-up roughly 1 month ago.  He has been having anal pain with defecation the had limited toleration prior endoscopy.  Have been using diltiazem cream as needed instead of 3 times daily.  He then reported having right red blood on toilet paper.  Anoscopy at that visit showed either a sentinel tag versus a bleeding condyloma limited by anal sphincter spasm.    Past Medical History:  has a past medical history of Acute midline low back pain with left-sided sciatica (9/3/2020), Anxiety (2/18/2020), Arthritis, Blood clotting disorder ([facility]) (2019), Diabetes ([facility]), Heart burn, Hemorrhagic disorder ([facility]), Hypertension, Obesities, morbid ([facility]), JAYLIN (obstructive sleep apnea) (1/30/2020), Seizure ([facility]) (12/30/2019), Smoking (7/10/2014), and Stroke ([facility]).  Past Surgical History:  has no past surgical history on file.  Past Social History:  reports that he quit smoking about 2 years ago. His smoking use included cigarettes. He started smoking about 15 years ago. He has a 13.00 pack-year smoking history. He has never used smokeless tobacco. He reports current drug use. Drug: Inhaled. He reports that he does not drink alcohol.  Past Family History:   Family History   Problem Relation Age of Onset   • Diabetes Mother    • Diabetes Father    • Heart Disease Paternal Grandfather         MI     Allergies: Patient has no known allergies.    Current Medications:  Prior to Admission medications    Medication Sig Start Date End Date Taking? Authorizing Provider   enoxaparin (LOVENOX) 150 MG/ML Solution Inject 150 mg under the skin every 12 hours. 9/30/21   China Nelson, A.P.N.   VITAMIN D PO Take  by mouth every day.    Physician Outpatient   Acetaminophen (TYLENOL PO)  "Take  by mouth as needed.    Physician Outpatient   Polyethylene Glycol 3350 (MIRALAX PO) Take  by mouth every day.    Physician Outpatient   atorvastatin (LIPITOR) 20 MG Tab Take 1 tablet by mouth nightly 7/29/21   Odilia Pereyra M.D.   glyBURIDE-metFORMIN (GLUCOVANCE) 5-500 MG Tab TAKE 1 TABLET BY MOUTH IN THE MORNING WITH BREAKFAST 7/29/21   Odilia Pereyra M.D.   lisinopril-hydrochlorothiazide (PRINZIDE) 20-25 MG per tablet Take 1 tablet by mouth every day. 7/29/21   Odilia Pereyra M.D.   warfarin (COUMADIN) 5 MG Tab Patient to take 10mg on Mon & Wed and 5mg ROW or as directed by the [facility] 6/24/21   MAXX Alvarez.   hydrocortisone (ANUSOL-HC) 25 MG Suppos Insert 1 Suppository into the rectum every 12 hours. 6/8/21   Odilia Pereyra M.D.   aspirin (ASA) 81 MG Chew Tab chewable tablet Take 1 Tab by mouth every day. 7/31/20   Rachel Martínez P.A.-C.       Review of Systems:  [facility] 1.778 m (5' 10\")   Wt (!) 183 kg (402 lb 12.5 oz)   A complete 12 point review of systems was performed.    Constitutional: Denies fevers, chills, night sweats; Denies weight changes  Eyes: Denies eye pain, denies eye discharge  Ears/Nose/Throat/Mouth: Denies nasal congestion or sore throat   Cardiovascular: Denies chest pain or palpitations.  Respiratory: Denies shortness of breath, cough, and wheezing.  Gastrointestinal/Hepatic: see HPI  Genitourinary: Denies dysuria, increased frequency, hematuria  Musculoskeletal/Rheum: Denies joint pain and swelling, denies edema  Skin: Denies rash, denies wounds  Neurological: Denies headache, confusion, memory loss or focal weakness/parasthesias  Psychiatric: denies mood disorder, denies hallucinations   Endocrine: Denies thyroid problems; denies polydipsia  Heme/Oncology/Lymph Nodes: Denies enlarged lymph nodes, denies bruising or known bleeding disorder      Physical Examination:  Physical Exam  Constitutional:       Appearance: Normal appearance. He is obese. "   HENT:      Nose: Nose normal.      Mouth/Throat:      Pharynx: Oropharynx is clear.   Eyes:      Conjunctiva/sclera: Conjunctivae normal.   Cardiovascular:      Rate and Rhythm: Normal rate and regular rhythm.      Pulses: Normal pulses.      Heart sounds: Normal heart sounds.   Pulmonary:      Effort: Pulmonary effort is normal.      Breath sounds: Normal breath sounds.   Abdominal:      General: Abdomen is flat.      Palpations: Abdomen is soft.   Musculoskeletal:      Cervical back: Neck supple.   Neurological:      General: No focal deficit present.      Mental Status: He is alert and oriented to person, place, and time.         Impression:  Anal pain and possible mass    Plan:  Flexible sigmoidoscopy with possible biopsy    Pre Procedure Assessment:  <[id number]>      Pre Procedure update:   No changes.    Berhane Saravia M.D.  10/12/2021

## 2021-10-12 NOTE — ANESTHESIA TIME REPORT
Anesthesia Start and Stop Event Times     Date Time Event    10/12/2021 0938 Ready for Procedure     1002 Anesthesia Start     1017 Anesthesia Stop        Responsible Staff  10/12/21    Name Role Begin End    Oz Lucas III, M.D. Anesth 1002 1017        Preop Diagnosis (Free Text):  Pre-op Diagnosis     ANORECTAL PAIN, SYSTEMIC LUPUS ERYTHEMATOUS        Preop Diagnosis (Codes):    Premium Reason  Non-Premium    Comments:

## 2021-10-12 NOTE — OR NURSING
1046 Report received from KENDRA Wu and pt transferred to phase II. Ambulated to recliner with minimal assistance.     1056 Discharge instructions reviewed with family member. All questions answered, verbalizes understanding. Ride is on the way.     IV and ID bands removed, assisted to change into own clothing. All personal belongings & discharge instructions with patient.    1119 Declined wheelchair escort, ambulated to car, accompanied by CNA.

## 2021-10-12 NOTE — ANESTHESIA PREPROCEDURE EVALUATION
Relevant Problems   NEURO   (positive) Cerebrovascular accident (CVA) due to vascular occlusion (HCC)      CARDIAC   (positive) Hypertension         (positive) NAFLD (nonalcoholic fatty liver disease)      ENDO   (positive) Type 2 diabetes mellitus without complication, without long-term current use of insulin (HCC)       Physical Exam    Airway   Mallampati: IV  TM distance: >3 FB  Neck ROM: limited       Cardiovascular - normal exam  Rhythm: regular  Rate: normal  (-) murmur     Dental - normal exam           Pulmonary - normal exam  Breath sounds clear to auscultation     Abdominal   (+) obese     Neurological - normal exam         Other findings: Severe and morbid obesity, multiple medical problems            Anesthesia Plan    ASA 3       Plan - general       Airway plan will be natural airway          Induction: intravenous    Postoperative Plan: Postoperative administration of opioids is intended.    Pertinent diagnostic labs and testing reviewed    Informed Consent:    Anesthetic plan and risks discussed with patient.    Use of blood products discussed with: patient whom consented to blood products.

## 2021-10-12 NOTE — PROGRESS NOTES
1017: Pt arrived from OR to bay #[id number]. ID verified. Report received. Connected to monitor. 3L O2 via mask. Pt awake, but drowsy. No c/o pain or nausea.    1030: weaned to room air    1035: Dr. Lorenzo at bedside    1043: telephone report to KENDRA Nunez    1045: pt transferred to phase 2 via [facility] by KENDRA Bullard. All belongings taken.

## 2021-10-12 NOTE — ANESTHESIA POSTPROCEDURE EVALUATION
Patient: Christ Calixto    Procedure Summary     Date: 10/12/21 Room / Location: [facility] ROOM 26 / SURGERY SAME DAY [facility]    Anesthesia Start: 1002 Anesthesia Stop: 1017    Procedure: SIGMOIDOSCOPY - FLEXIBLE WITH DILATION (N/A Anus) Diagnosis: (Hemorrhoids,)    Surgeons: Berhane Saravia M.D. Responsible Provider: Oz Lucas III, M.D.    Anesthesia Type: general ASA Status: 3          Final Anesthesia Type: general  Last vitals  BP   Blood Pressure: 138/78    Temp   36.9 °C (98.4 °F)    Pulse   94   Resp   20    SpO2   95 %      Anesthesia Post Evaluation    Patient location during evaluation: PACU  Patient participation: complete - patient participated  Level of consciousness: awake and alert  Pain score: 0    Airway patency: patent  Anesthetic complications: no  Cardiovascular status: hemodynamically stable  Respiratory status: acceptable  Hydration status: euvolemic    PONV: none          No complications documented.     Nurse Pain Score: 0 (NPRS)

## 2021-10-14 DIAGNOSIS — Z79.01 CHRONIC ANTICOAGULATION: ICD-10-CM

## 2021-10-18 ENCOUNTER — ANTICOAGULATION VISIT (OUTPATIENT)
Dept: VASCULAR LAB | Facility: MEDICAL CENTER | Age: 36
End: 2021-10-18
Attending: INTERNAL MEDICINE
Payer: COMMERCIAL

## 2021-10-18 DIAGNOSIS — R76.0 LUPUS ANTICOAGULANT POSITIVE: ICD-10-CM

## 2021-10-18 LAB
INR BLD: 2.2 (ref 0.9–1.2)
INR PPP: 2.2 (ref 2–3.5)

## 2021-10-18 PROCEDURE — 85610 PROTHROMBIN TIME: CPT

## 2021-10-18 PROCEDURE — 99211 OFF/OP EST MAY X REQ PHY/QHP: CPT | Performed by: STUDENT IN AN ORGANIZED HEALTH CARE EDUCATION/TRAINING PROGRAM

## 2021-10-18 NOTE — PROGRESS NOTES
Anticoagulation Summary  As of 10/18/2021    INR goal:  2.0-3.0   TTR:  47.8 % (1.1 y)   INR used for dosin.20 (10/18/2021)   Warfarin maintenance plan:  10 mg (5 mg x 2) every Thu; 5 mg (5 mg x 1) all other days   Weekly warfarin total:  40 mg   Plan last modified:  SABINA Alvarez (2021)   Next INR check:  10/25/2021   Target end date:  Indefinite    Indications    Lupus anticoagulant positive [R76.0]  Cerebrovascular accident (CVA) due to vascular occlusion (HCC) [I63.9]             Anticoagulation Episode Summary     INR check location:      Preferred lab:      Send INR reminders to:      Comments:        Anticoagulation Care Providers     Provider Role Specialty Phone number    Aayush Parra M.D. Referring Medical Oncology [phone]    [facility] Responsible  [phone]    Julio Gibbons, PharmD Responsible                  Refer to Patient Findings for HPI:  Patient Findings     Positives:  Signs/symptoms of bleeding (Pt has been seen for hemerrhoidal bleeding. Treatment ongoing), Change in medications (Pt has been briding with Lovenox.)    Negatives:  Signs/symptoms of thrombosis, Laboratory test error suspected, Change in health, Change in alcohol use, Change in activity, Upcoming invasive procedure, Emergency department visit, Upcoming dental procedure, Missed doses, Extra doses, Change in diet/appetite, Hospital admission, Bruising, Other complaints          There were no vitals filed for this visit.  pt declined vitals    Verified current warfarin dosing schedule.    Medications reconciled  Pt is on ASA 81 mg as antiplatelet therapy for CVA and must be reviewed again on - indefinitely.      A/P   INR  therapeutic.     Warfarin dosing recommendation: Pt has been bridging since the procedure. He is to stop bridging today as he is therapeutic. He will continue with his current regimen of 5mg daily.    Pt educated to contact our clinic with any changes in  medications or s/s of bleeding or thrombosis. Pt is aware to seek immediate medical attention for falls, head injury or deep cuts.    Follow up appointment in 1 week(s).    Robbie Bustos, ArmandoD

## 2021-10-25 ENCOUNTER — ANTICOAGULATION VISIT (OUTPATIENT)
Dept: VASCULAR LAB | Facility: MEDICAL CENTER | Age: 36
End: 2021-10-25
Attending: INTERNAL MEDICINE
Payer: COMMERCIAL

## 2021-10-25 DIAGNOSIS — R76.0 LUPUS ANTICOAGULANT POSITIVE: ICD-10-CM

## 2021-10-25 LAB — INR PPP: 2.1 (ref 2–3.5)

## 2021-10-25 PROCEDURE — 99211 OFF/OP EST MAY X REQ PHY/QHP: CPT | Performed by: STUDENT IN AN ORGANIZED HEALTH CARE EDUCATION/TRAINING PROGRAM

## 2021-10-25 PROCEDURE — 85610 PROTHROMBIN TIME: CPT

## 2021-10-25 RX ORDER — WARFARIN SODIUM 5 MG/1
TABLET ORAL
Qty: 60 TABLET | Refills: 2 | Status: SHIPPED | OUTPATIENT
Start: 2021-10-25

## 2021-10-25 NOTE — Clinical Note
Christ has been struggling with hemerroids that he has been following GI for. We discussed what he should do if he has continued bleeding. We also discussed his headaches and the risk for brain bleeding and when he should present to the ED.    Thank you,  Robbie Bustos, PharmD

## 2021-10-25 NOTE — PROGRESS NOTES
Anticoagulation Summary  As of 10/25/2021    INR goal:  2.0-3.0   TTR:  48.6 % (1.2 y)   INR used for dosin.10 (10/25/2021)   Warfarin maintenance plan:  10 mg (5 mg x 2) every Thu; 5 mg (5 mg x 1) all other days   Weekly warfarin total:  40 mg   Plan last modified:  SABINA Alvarez (2021)   Next INR check:  11/15/2021   Target end date:  Indefinite    Indications    Lupus anticoagulant positive [R76.0]  Cerebrovascular accident (CVA) due to vascular occlusion (HCC) [I63.9]             Anticoagulation Episode Summary     INR check location:      Preferred lab:      Send INR reminders to:      Comments:        Anticoagulation Care Providers     Provider Role Specialty Phone number    Aayush Parra M.D. Referring Medical Oncology [phone]    [facility] Responsible  [phone]    Julio Gibbons, PharmD Responsible                  Refer to Patient Findings for HPI:  Patient Findings     Positives:  Signs/symptoms of bleeding (Pt has been seen for hemerrhoidal bleeding. Treatment ongoing), Other complaints (Pt is complaining of head ache he believes is due to poor sleep.)    Negatives:  Signs/symptoms of thrombosis, Laboratory test error suspected, Change in health, Change in alcohol use, Change in activity, Upcoming invasive procedure, Emergency department visit, Upcoming dental procedure, Missed doses, Extra doses, Change in medications, Change in diet/appetite, Hospital admission, Bruising          There were no vitals filed for this visit.  pt declined vitals    Verified current warfarin dosing schedule.    Medications reconciled  Pt is on ASA 81 mg as antiplatelet therapy for CVA and must be reviewed again on - indefinitely.    Pt is complaining of head ache. Discussed risk of head bleed and that he should follow up and present to the ED if it continues for 3 or more days or if it significantly worsens or he has any confusion or blurry vision.    Also discussed that pt  should present to ED if he has bleeding from hemorrhoids that does not stop or is continuous.    A/P   INR  therapeutic.     Warfarin dosing recommendation: Pt to continue with current regimen.    Pt educated to contact our clinic with any changes in medications or s/s of bleeding or thrombosis. Pt is aware to seek immediate medical attention for falls, head injury or deep cuts.    Follow up appointment in 3 week(s) per pt request.    Robbie Bustos, ArmandoD

## 2021-11-01 ENCOUNTER — DOCUMENTATION (OUTPATIENT)
Dept: VASCULAR LAB | Facility: MEDICAL CENTER | Age: 36
End: 2021-11-01

## 2021-11-01 NOTE — PROGRESS NOTES
Sent most recent anticoag encounter to [facility] as requested for Home monitor application    Vania Hudson, ArmandoD

## 2021-11-04 ENCOUNTER — ANTICOAGULATION MONITORING (OUTPATIENT)
Dept: VASCULAR LAB | Facility: MEDICAL CENTER | Age: 36
End: 2021-11-04

## 2021-11-04 DIAGNOSIS — R76.0 LUPUS ANTICOAGULANT POSITIVE: ICD-10-CM

## 2021-11-04 NOTE — PROGRESS NOTES
Pt to have procedure on 11/9/2021. Due to pt history lovenox bridging is indicated.   Pt to follow instructions as below, after procedure to ask operating MD when safe to resume anticoagulation, if no instructions given, pt will follow as below    CHADSvasc n/a  Clot history hx of CVA, lupus anticoagulant    Current weight 180  CrCl = > 100 mL/min  Lab Results   Component Value Date/Time    BUN 18 09/28/2021 10:21 AM    CREATININE 0.98 09/28/2021 10:21 AM        Used Lovenox before Yes        Date  Warfarin dosing AM Lovenox PM Lovenox   5 Days before procedure 11/4 0mg NONE NONE   4 Days before procedure 11/5 0mg Lovenox injection Lovenox injection   3 Days before procedure 11/6 0mg Lovenox injection Lovenox injection   2 Days before procedure 11/7 0mg Lovenox injection Lovenox injection   1 Days before procedure 11/8 0mg NONE NONE   PROCEDURE 11/9 10mg NONE NONE   1 Day after procedure 11/10 10mg Restart Lovenox injections    Lovenox injection   2 Days after procedure 11/11 10mg Lovenox injection Lovenox injection   3 Days after procedure 11/12 10mg Lovenox injection Lovenox injection   4 Days after procedure    11/13 5mg Lovenox injection    Lovenox injection      5 Days after procedure 11/14 5mg Lovenox injection Lovenox injection   6 Days after procedure 11/15 TBD Lovenox injection GET INR checked       Shawn Watson, PharmD

## 2021-11-08 ENCOUNTER — ANESTHESIA EVENT (OUTPATIENT)
Dept: SURGERY | Facility: MEDICAL CENTER | Age: 36
End: 2021-11-08
Payer: COMMERCIAL

## 2021-11-08 ENCOUNTER — PRE-ADMISSION TESTING (OUTPATIENT)
Dept: ADMISSIONS | Facility: MEDICAL CENTER | Age: 36
End: 2021-11-08
Attending: SURGERY
Payer: COMMERCIAL

## 2021-11-08 DIAGNOSIS — Z01.812 PRE-OPERATIVE LABORATORY EXAMINATION: ICD-10-CM

## 2021-11-08 LAB
ANION GAP SERPL CALC-SCNC: 10 MMOL/L (ref 7–16)
BUN SERPL-MCNC: 13 MG/DL (ref 8–22)
CALCIUM SERPL-MCNC: 8.7 MG/DL (ref 8.5–10.5)
CHLORIDE SERPL-SCNC: 101 MMOL/L (ref 96–112)
CO2 SERPL-SCNC: 25 MMOL/L (ref 20–33)
CREAT SERPL-MCNC: 1.05 MG/DL (ref 0.5–1.4)
GLUCOSE SERPL-MCNC: 174 MG/DL (ref 65–99)
POTASSIUM SERPL-SCNC: 4.2 MMOL/L (ref 3.6–5.5)
SARS-COV+SARS-COV-2 AG RESP QL IA.RAPID: NOTDETECTED
SODIUM SERPL-SCNC: 136 MMOL/L (ref 135–145)
SPECIMEN SOURCE: NORMAL

## 2021-11-08 PROCEDURE — 36415 COLL VENOUS BLD VENIPUNCTURE: CPT

## 2021-11-08 PROCEDURE — 80048 BASIC METABOLIC PNL TOTAL CA: CPT

## 2021-11-08 PROCEDURE — 87426 SARSCOV CORONAVIRUS AG IA: CPT

## 2021-11-08 ASSESSMENT — FIBROSIS 4 INDEX: FIB4 SCORE: 0.66

## 2021-11-09 ENCOUNTER — HOSPITAL ENCOUNTER (OUTPATIENT)
Facility: MEDICAL CENTER | Age: 36
End: 2021-11-09
Attending: SURGERY | Admitting: SURGERY
Payer: COMMERCIAL

## 2021-11-09 ENCOUNTER — ANESTHESIA (OUTPATIENT)
Dept: SURGERY | Facility: MEDICAL CENTER | Age: 36
End: 2021-11-09
Payer: COMMERCIAL

## 2021-11-09 VITALS
RESPIRATION RATE: 24 BRPM | HEART RATE: 85 BPM | TEMPERATURE: 97.7 F | BODY MASS INDEX: 45.1 KG/M2 | SYSTOLIC BLOOD PRESSURE: 154 MMHG | OXYGEN SATURATION: 94 % | WEIGHT: 315 LBS | HEIGHT: 70 IN | DIASTOLIC BLOOD PRESSURE: 72 MMHG

## 2021-11-09 DIAGNOSIS — G89.18 POST-OP PAIN: ICD-10-CM

## 2021-11-09 LAB
ERYTHROCYTE [DISTWIDTH] IN BLOOD BY AUTOMATED COUNT: 42.6 FL (ref 35.9–50)
GLUCOSE BLD-MCNC: 130 MG/DL (ref 65–99)
GLUCOSE BLD-MCNC: 157 MG/DL (ref 65–99)
HCT VFR BLD AUTO: 40.6 % (ref 42–52)
HGB BLD-MCNC: 13.7 G/DL (ref 14–18)
INR PPP: 0.96 (ref 0.87–1.13)
MCH RBC QN AUTO: 28.3 PG (ref 27–33)
MCHC RBC AUTO-ENTMCNC: 33.7 G/DL (ref 33.7–35.3)
MCV RBC AUTO: 83.9 FL (ref 81.4–97.8)
PLATELET # BLD AUTO: 224 K/UL (ref 164–446)
PMV BLD AUTO: 9.4 FL (ref 9–12.9)
PROTHROMBIN TIME: 12.5 SEC (ref 12–14.6)
RBC # BLD AUTO: 4.84 M/UL (ref 4.7–6.1)
WBC # BLD AUTO: 6.7 K/UL (ref 4.8–10.8)

## 2021-11-09 PROCEDURE — 160046 HCHG PACU - 1ST 60 MINS PHASE II: Performed by: SURGERY

## 2021-11-09 PROCEDURE — 700102 HCHG RX REV CODE 250 W/ 637 OVERRIDE(OP): Performed by: ANESTHESIOLOGY

## 2021-11-09 PROCEDURE — 160027 HCHG SURGERY MINUTES - 1ST 30 MINS LEVEL 2: Performed by: SURGERY

## 2021-11-09 PROCEDURE — 85610 PROTHROMBIN TIME: CPT

## 2021-11-09 PROCEDURE — 160002 HCHG RECOVERY MINUTES (STAT): Performed by: SURGERY

## 2021-11-09 PROCEDURE — 700101 HCHG RX REV CODE 250: Performed by: ANESTHESIOLOGY

## 2021-11-09 PROCEDURE — 160048 HCHG OR STATISTICAL LEVEL 1-5: Performed by: SURGERY

## 2021-11-09 PROCEDURE — 700105 HCHG RX REV CODE 258: Performed by: SURGERY

## 2021-11-09 PROCEDURE — 82962 GLUCOSE BLOOD TEST: CPT | Mod: 91

## 2021-11-09 PROCEDURE — 501838 HCHG SUTURE GENERAL: Performed by: SURGERY

## 2021-11-09 PROCEDURE — 85027 COMPLETE CBC AUTOMATED: CPT

## 2021-11-09 PROCEDURE — A9270 NON-COVERED ITEM OR SERVICE: HCPCS | Performed by: ANESTHESIOLOGY

## 2021-11-09 PROCEDURE — 160025 RECOVERY II MINUTES (STATS): Performed by: SURGERY

## 2021-11-09 PROCEDURE — 160009 HCHG ANES TIME/MIN: Performed by: SURGERY

## 2021-11-09 PROCEDURE — 160038 HCHG SURGERY MINUTES - EA ADDL 1 MIN LEVEL 2: Performed by: SURGERY

## 2021-11-09 PROCEDURE — 700111 HCHG RX REV CODE 636 W/ 250 OVERRIDE (IP): Performed by: ANESTHESIOLOGY

## 2021-11-09 PROCEDURE — 160035 HCHG PACU - 1ST 60 MINS PHASE I: Performed by: SURGERY

## 2021-11-09 PROCEDURE — 700101 HCHG RX REV CODE 250: Performed by: SURGERY

## 2021-11-09 RX ORDER — ONDANSETRON 2 MG/ML
INJECTION INTRAMUSCULAR; INTRAVENOUS PRN
Status: DISCONTINUED | OUTPATIENT
Start: 2021-11-09 | End: 2021-11-09 | Stop reason: SURG

## 2021-11-09 RX ORDER — DIPHENHYDRAMINE HYDROCHLORIDE 50 MG/ML
12.5 INJECTION INTRAMUSCULAR; INTRAVENOUS
Status: DISCONTINUED | OUTPATIENT
Start: 2021-11-09 | End: 2021-11-09 | Stop reason: HOSPADM

## 2021-11-09 RX ORDER — KETOROLAC TROMETHAMINE 30 MG/ML
30 INJECTION, SOLUTION INTRAMUSCULAR; INTRAVENOUS ONCE
Status: DISCONTINUED | OUTPATIENT
Start: 2021-11-09 | End: 2021-11-09

## 2021-11-09 RX ORDER — LIDOCAINE HYDROCHLORIDE 20 MG/ML
INJECTION, SOLUTION EPIDURAL; INFILTRATION; INTRACAUDAL; PERINEURAL PRN
Status: DISCONTINUED | OUTPATIENT
Start: 2021-11-09 | End: 2021-11-09 | Stop reason: SURG

## 2021-11-09 RX ORDER — BUPIVACAINE HYDROCHLORIDE AND EPINEPHRINE 5; 5 MG/ML; UG/ML
INJECTION, SOLUTION EPIDURAL; INTRACAUDAL; PERINEURAL
Status: DISCONTINUED | OUTPATIENT
Start: 2021-11-09 | End: 2021-11-09 | Stop reason: HOSPADM

## 2021-11-09 RX ORDER — KETOROLAC TROMETHAMINE 30 MG/ML
30 INJECTION, SOLUTION INTRAMUSCULAR; INTRAVENOUS ONCE
Status: COMPLETED | OUTPATIENT
Start: 2021-11-09 | End: 2021-11-09

## 2021-11-09 RX ORDER — ROCURONIUM BROMIDE 10 MG/ML
INJECTION, SOLUTION INTRAVENOUS PRN
Status: DISCONTINUED | OUTPATIENT
Start: 2021-11-09 | End: 2021-11-09 | Stop reason: SURG

## 2021-11-09 RX ORDER — LABETALOL HYDROCHLORIDE 5 MG/ML
5 INJECTION, SOLUTION INTRAVENOUS
Status: DISCONTINUED | OUTPATIENT
Start: 2021-11-09 | End: 2021-11-09 | Stop reason: HOSPADM

## 2021-11-09 RX ORDER — DEXTROSE MONOHYDRATE 25 G/50ML
50 INJECTION, SOLUTION INTRAVENOUS
Status: DISCONTINUED | OUTPATIENT
Start: 2021-11-09 | End: 2021-11-09 | Stop reason: HOSPADM

## 2021-11-09 RX ORDER — OXYCODONE HYDROCHLORIDE AND ACETAMINOPHEN 5; 325 MG/1; MG/1
1-2 TABLET ORAL EVERY 4 HOURS PRN
Qty: 40 TABLET | Refills: 0 | Status: SHIPPED | OUTPATIENT
Start: 2021-11-09 | End: 2021-11-16

## 2021-11-09 RX ORDER — HYDRALAZINE HYDROCHLORIDE 20 MG/ML
5 INJECTION INTRAMUSCULAR; INTRAVENOUS
Status: DISCONTINUED | OUTPATIENT
Start: 2021-11-09 | End: 2021-11-09 | Stop reason: HOSPADM

## 2021-11-09 RX ORDER — ONDANSETRON 2 MG/ML
4 INJECTION INTRAMUSCULAR; INTRAVENOUS
Status: DISCONTINUED | OUTPATIENT
Start: 2021-11-09 | End: 2021-11-09 | Stop reason: HOSPADM

## 2021-11-09 RX ORDER — SODIUM CHLORIDE, SODIUM LACTATE, POTASSIUM CHLORIDE, CALCIUM CHLORIDE 600; 310; 30; 20 MG/100ML; MG/100ML; MG/100ML; MG/100ML
INJECTION, SOLUTION INTRAVENOUS CONTINUOUS
Status: ACTIVE | OUTPATIENT
Start: 2021-11-09 | End: 2021-11-09

## 2021-11-09 RX ORDER — LIDOCAINE 5% 5 G/100G
1 CREAM TOPICAL 4 TIMES DAILY PRN
Qty: 500 G | Refills: 3 | Status: SHIPPED | OUTPATIENT
Start: 2021-11-09

## 2021-11-09 RX ORDER — POLYETHYLENE GLYCOL 3350 17 G/17G
17 POWDER, FOR SOLUTION ORAL
COMMUNITY

## 2021-11-09 RX ORDER — SODIUM CHLORIDE, SODIUM LACTATE, POTASSIUM CHLORIDE, CALCIUM CHLORIDE 600; 310; 30; 20 MG/100ML; MG/100ML; MG/100ML; MG/100ML
INJECTION, SOLUTION INTRAVENOUS CONTINUOUS
Status: DISCONTINUED | OUTPATIENT
Start: 2021-11-09 | End: 2021-11-09 | Stop reason: HOSPADM

## 2021-11-09 RX ORDER — HYDROMORPHONE HYDROCHLORIDE 1 MG/ML
0.1 INJECTION, SOLUTION INTRAMUSCULAR; INTRAVENOUS; SUBCUTANEOUS
Status: DISCONTINUED | OUTPATIENT
Start: 2021-11-09 | End: 2021-11-09 | Stop reason: HOSPADM

## 2021-11-09 RX ORDER — HYDROMORPHONE HYDROCHLORIDE 1 MG/ML
0.4 INJECTION, SOLUTION INTRAMUSCULAR; INTRAVENOUS; SUBCUTANEOUS
Status: DISCONTINUED | OUTPATIENT
Start: 2021-11-09 | End: 2021-11-09 | Stop reason: HOSPADM

## 2021-11-09 RX ORDER — HYDROMORPHONE HYDROCHLORIDE 1 MG/ML
0.2 INJECTION, SOLUTION INTRAMUSCULAR; INTRAVENOUS; SUBCUTANEOUS
Status: DISCONTINUED | OUTPATIENT
Start: 2021-11-09 | End: 2021-11-09 | Stop reason: HOSPADM

## 2021-11-09 RX ORDER — DEXAMETHASONE SODIUM PHOSPHATE 4 MG/ML
INJECTION, SOLUTION INTRA-ARTICULAR; INTRALESIONAL; INTRAMUSCULAR; INTRAVENOUS; SOFT TISSUE PRN
Status: DISCONTINUED | OUTPATIENT
Start: 2021-11-09 | End: 2021-11-09 | Stop reason: SURG

## 2021-11-09 RX ADMIN — PROPOFOL 300 MG: 10 INJECTION, EMULSION INTRAVENOUS at 17:10

## 2021-11-09 RX ADMIN — KETOROLAC TROMETHAMINE 30 MG: 30 INJECTION, SOLUTION INTRAMUSCULAR at 18:46

## 2021-11-09 RX ADMIN — PROPOFOL 50 MG: 10 INJECTION, EMULSION INTRAVENOUS at 17:26

## 2021-11-09 RX ADMIN — ROCURONIUM BROMIDE 20 MG: 10 INJECTION, SOLUTION INTRAVENOUS at 17:21

## 2021-11-09 RX ADMIN — ONDANSETRON 4 MG: 2 INJECTION INTRAMUSCULAR; INTRAVENOUS at 17:13

## 2021-11-09 RX ADMIN — SODIUM CHLORIDE, POTASSIUM CHLORIDE, SODIUM LACTATE AND CALCIUM CHLORIDE: 600; 310; 30; 20 INJECTION, SOLUTION INTRAVENOUS at 17:03

## 2021-11-09 RX ADMIN — HYDROMORPHONE HYDROCHLORIDE 0.4 MG: 1 INJECTION, SOLUTION INTRAMUSCULAR; INTRAVENOUS; SUBCUTANEOUS at 18:06

## 2021-11-09 RX ADMIN — Medication 160 MG: at 17:10

## 2021-11-09 RX ADMIN — FENTANYL CITRATE 100 MCG: 50 INJECTION, SOLUTION INTRAMUSCULAR; INTRAVENOUS at 17:09

## 2021-11-09 RX ADMIN — SUGAMMADEX 200 MG: 100 INJECTION, SOLUTION INTRAVENOUS at 17:41

## 2021-11-09 RX ADMIN — HYDROMORPHONE HYDROCHLORIDE 0.4 MG: 1 INJECTION, SOLUTION INTRAMUSCULAR; INTRAVENOUS; SUBCUTANEOUS at 18:16

## 2021-11-09 RX ADMIN — HYDROCODONE BITARTRATE AND ACETAMINOPHEN 15 MG: 7.5; 325 SOLUTION ORAL at 18:03

## 2021-11-09 RX ADMIN — LIDOCAINE HYDROCHLORIDE 100 MG: 20 INJECTION, SOLUTION EPIDURAL; INFILTRATION; INTRACAUDAL at 17:08

## 2021-11-09 RX ADMIN — DEXAMETHASONE SODIUM PHOSPHATE 4 MG: 4 INJECTION, SOLUTION INTRA-ARTICULAR; INTRALESIONAL; INTRAMUSCULAR; INTRAVENOUS; SOFT TISSUE at 17:13

## 2021-11-09 RX ADMIN — SODIUM CHLORIDE, POTASSIUM CHLORIDE, SODIUM LACTATE AND CALCIUM CHLORIDE: 600; 310; 30; 20 INJECTION, SOLUTION INTRAVENOUS at 14:15

## 2021-11-09 ASSESSMENT — FIBROSIS 4 INDEX: FIB4 SCORE: 0.66

## 2021-11-09 ASSESSMENT — PAIN SCALES - GENERAL: PAIN_LEVEL: 5

## 2021-11-09 ASSESSMENT — PAIN DESCRIPTION - PAIN TYPE
TYPE: SURGICAL PAIN

## 2021-11-10 NOTE — OR NURSING
1750- Pt arrives to PACU from OR on 8L of oxygen via mask. Report received. Pt states pain 8/10 upon arrival, medicated per MAR. Blood sugar 130.     1813- Call made to James (brother), no answer at this time.     1849- Pt brother James called and updated on POC and pt status.

## 2021-11-10 NOTE — ANESTHESIA POSTPROCEDURE EVALUATION
Patient: Christ Calixto    Procedure Summary     Date: 11/09/21 Room / Location: [place] [address] / SURGERY [facility]    Anesthesia Start: 1703 Anesthesia Stop:     Procedure: FISTULOTOMY, ANAL (N/A Anus) Diagnosis: (ANAL FISSURE AND FISTULA)    Surgeons: Garret Mcdonald M.D. Responsible Provider: Jermaine Reaves M.D.    Anesthesia Type: general ASA Status: 3          Final Anesthesia Type: general  Last vitals  BP   Blood Pressure: 135/95    Temp   36.3 °C (97.4 °F)    Pulse   99   Resp   15    SpO2   95 %      Anesthesia Post Evaluation    Patient location during evaluation: PACU  Patient participation: complete - patient participated  Level of consciousness: awake and alert  Pain score: 5    Airway patency: patent  Anesthetic complications: no  Cardiovascular status: hemodynamically stable  Respiratory status: acceptable  Hydration status: euvolemic  Comments: Reports pain at surgical site, orders given to RN for IV and PO pain meds.    PONV: none          No complications documented.     Nurse Pain Score: 0 (NPRS)

## 2021-11-10 NOTE — ANESTHESIA PREPROCEDURE EVALUATION
Case: [id number] Date/Time: 21 1445    Procedure: FISTULOTOMY, ANAL    Pre-op diagnosis: ANAL FISSURE AND FISTULA    Location: [facility] OR 11 / SURGERY [facility]    Surgeons: Garret Mcdonald M.D.        Anes H&P:  PAST MEDICAL HISTORY:   36 y.o. male who presents for Procedure(s):  FISTULOTOMY, ANAL.  He has current and past medical problems significant for:    Past Medical History:   Diagnosis Date   • Acute midline low back pain with left-sided sciatica 9/3/2020   • Anxiety 2020   • Arthritis    • Blood clotting disorder (HCC) 2019    blood clot in brain   • Diabetes (HCC)    • Heart burn    • Hemorrhagic disorder (HCC)     anticoagulant lupus   • Hypertension     first  discovered    • Obesities, morbid (HCC)     since childhood   • JAYLIN (obstructive sleep apnea) 2020   • Seizure (HCC) 2019    only one seizure, 2019   • Smoking 7/10/2014   • Stroke (HCC)        SMOKING/ALCOHOL/RECREATIONAL DRUG USE:  Social History     Tobacco Use   • Smoking status: Former Smoker     Packs/day: 1.00     Years: 13.00     Pack years: 13.00     Types: Cigarettes     Start date: 2006     Quit date: 2019     Years since quittin.7   • Smokeless tobacco: Never Used   Vaping Use   • Vaping Use: Never used   Substance Use Topics   • Alcohol use: No   • Drug use: Yes     Types: Inhaled     Comment: smokes marajuana      Social History     Substance and Sexual Activity   Drug Use Yes   • Types: Inhaled    Comment: smokes marajuana        PAST SURGICAL HISTORY:  Past Surgical History:   Procedure Laterality Date   • PB SIGMOIDOSCOPY,DIAGNOSTIC N/A 10/12/2021    Procedure: SIGMOIDOSCOPY - FLEXIBLE WITH DILATION;  Surgeon: Berhane Saravia M.D.;  Location: SURGERY SAME DAY [facility];  Service: Gastroenterology       ALLERGIES:   No Known Allergies    MEDICATIONS:  No current facility-administered medications on file prior to encounter.     Current Outpatient Medications on File Prior to Encounter    Medication Sig Dispense Refill   • polyethylene glycol/lytes (MIRALAX) 17 g Pack Take 17 g by mouth 1 time a day as needed.     • dilTIAZem HCl (DILTIAZEM 2 %) Insert  into the rectum every day. Pea size  Compounded  ointment     • warfarin (COUMADIN) 5 MG Tab Patient to take 10mg on Mon & Wed and 5mg ROW or as directed by the [facility] (Patient taking differently: Take 5-10 mg by mouth every day. Patient to take 10mg on Mon & Wed and 5mg ROW or as directed by the [facility]) 60 Tablet 2   • VITAMIN D PO Take 1 Tablet by mouth every day.     • acetaminophen (TYLENOL) 325 MG Tab Take 650 mg by mouth 1 time a day as needed.     • atorvastatin (LIPITOR) 20 MG Tab Take 1 tablet by mouth nightly 90 tablet 3   • glyBURIDE-metFORMIN (GLUCOVANCE) 5-500 MG Tab TAKE 1 TABLET BY MOUTH IN THE MORNING WITH BREAKFAST (Patient taking differently: Take 1 Tablet by mouth every morning with breakfast. TAKE 1 TABLET BY MOUTH IN THE MORNING WITH BREAKFAST) 90 tablet 3   • lisinopril-hydrochlorothiazide (PRINZIDE) 20-25 MG per tablet Take 1 tablet by mouth every day. 90 tablet 3   • aspirin (ASA) 81 MG Chew Tab chewable tablet Take 1 Tab by mouth every day. 100 Tab 5       LABS:  Lab Results   Component Value Date/Time    HEMOGLOBIN 13.7 (L) 11/09/2021 1402    HEMATOCRIT 40.6 (L) 11/09/2021 1402    WBC 6.7 11/09/2021 1402     Lab Results   Component Value Date/Time    SODIUM 136 11/08/2021 1058    POTASSIUM 4.2 11/08/2021 1058    CHLORIDE 101 11/08/2021 1058    CO2 25 11/08/2021 1058    GLUCOSE 174 (H) 11/08/2021 1058    BUN 13 11/08/2021 1058    CALCIUM 8.7 11/08/2021 1058       SARS-CoV2 result: Negative      PREVIOUS ANESTHETICS: See EMR  __________________________________________    Relevant Problems   NEURO   (positive) Cerebrovascular accident (CVA) due to vascular occlusion (HCC)      CARDIAC   (positive) Hypertension         (positive) NAFLD (nonalcoholic fatty liver disease)      ENDO   (positive)  Type 2 diabetes mellitus without complication, without long-term current use of insulin (HCC)       Physical Exam    Airway   Mallampati: II  TM distance: >3 FB  Neck ROM: full       Cardiovascular - normal exam  Rhythm: regular  Rate: normal  (-) murmur     Dental - normal exam           Pulmonary - normal exam  Breath sounds clear to auscultation     Abdominal   (+) obese     Neurological - normal exam                 Anesthesia Plan    ASA 3   ASA physical status 3 criteria: morbid obesity - BMI greater than or equal to 40 and CVA or TIA - history (> 3 months)    Plan - general       Airway plan will be ETT          Induction: intravenous    Postoperative Plan: Postoperative administration of opioids is intended.    Pertinent diagnostic labs and testing reviewed    Informed Consent:    Anesthetic plan and risks discussed with patient.    Use of blood products discussed with: patient whom consented to blood products.

## 2021-11-10 NOTE — DISCHARGE INSTRUCTIONS
Activity: Rest and take it easy for the first 24 hours.  A responsible adult is recommended to remain with you during that time.  It is normal to feel sleepy.  We encourage you to not do anything that requires balance, judgment or coordination.    MILD FLU-LIKE SYMPTOMS ARE NORMAL. YOU MAY EXPERIENCE GENERALIZED MUSCLE ACHES, THROAT IRRITATION, HEADACHE AND/OR SOME NAUSEA.    FOR 24 HOURS DO NOT:  Drive, operate machinery or run household appliances.  Drink beer or alcoholic beverages.   Make important decisions or sign legal documents.    SPECIAL INSTRUCTIONS:     DIET: To avoid nausea, slowly advance diet as tolerated, avoiding spicy or greasy foods for the first day.  Add more substantial food to your diet according to your physician's instructions.  Babies can be fed formula or breast milk as soon as they are hungry.  INCREASE FLUIDS AND FIBER TO AVOID CONSTIPATION.    SURGICAL DRESSING/BATHING: **may shower and bathe daily with wounds uncovered*    FOLLOW-UP APPOINTMENT:  A follow-up appointment should be arranged with your doctor,  call to schedule.    You should CALL YOUR PHYSICIAN if you develop:  Fever greater than 101 degrees F.  Pain not relieved by medication, or persistent nausea or vomiting.  Excessive bleeding (blood soaking through dressing) or unexpected drainage from the wound.  Extreme redness or swelling around the incision site, drainage of pus or foul smelling drainage.  Inability to urinate or empty your bladder within 8 hours.  Problems with breathing or chest pain.    You should call 911 if you develop problems with breathing or chest pain.  If you are unable to contact your doctor or surgical center, you should go to the nearest emergency room or urgent care center.  Physician's telephone #: *[phone]**    If any questions arise, call your doctor.  If your doctor is not available, please feel free to call the Surgical Center at [phone]. The Contact Center is open Monday through  Friday 7AM to 5PM and may speak to a nurse at [phone], or toll free at [phone].     A registered nurse may call you a few days after your surgery to see how you are doing after your procedure.    MEDICATIONS: Resume taking daily medication.  Take prescribed pain medication with food.  If no medication is prescribed, you may take non-aspirin pain medication if needed.  PAIN MEDICATION CAN BE VERY CONSTIPATING.  Take a stool softener or laxative such as senokot, pericolace, or milk of magnesia if needed.    Prescription given for *called to pharmacy**.  Last pain medication given at *603 pm**.    If your physician has prescribed pain medication that includes Acetaminophen (Tylenol), do not take additional Acetaminophen (Tylenol) while taking the prescribed medication.    Depression / Suicide Risk    As you are discharged from this [facility] facility, it is important to learn how to keep safe from harming yourself.    Recognize the warning signs:  · Abrupt changes in personality, positive or negative- including increase in energy   · Giving away possessions  · Change in eating patterns- significant weight changes-  positive or negative  · Change in sleeping patterns- unable to sleep or sleeping all the time   · Unwillingness or inability to communicate  · Depression  · Unusual sadness, discouragement and loneliness  · Talk of wanting to die  · Neglect of personal appearance   · Rebelliousness- reckless behavior  · Withdrawal from people/activities they love  · Confusion- inability to concentrate     If you or a loved one observes any of these behaviors or has concerns about self-harm, here's what you can do:  · Talk about it- your feelings and reasons for harming yourself  · Remove any means that you might use to hurt yourself (examples: pills, rope, extension cords, firearm)  · Get professional help from the community (Mental Health, Substance Abuse, psychological counseling)  · Do not be alone:Call  your Safe Contact- someone whom you trust who will be there for you.  · Call your local CRISIS HOTLINE 852-8873 or 374-883-4334  · Call your local Children's Mobile Crisis Response Team [place] (234) 457-6736 or www.[employer]  · Call the toll free National Suicide Prevention Hotlines   · National Suicide Prevention Lifeline 566-130-SSNM (0959)  · National [employer] Line Network 800-SUICIDE (437-5980)

## 2021-11-10 NOTE — ANESTHESIA TIME REPORT
Anesthesia Start and Stop Event Times     Date Time Event    11/9/2021 1656 Ready for Procedure     1703 Anesthesia Start     1759 Anesthesia Stop        Responsible Staff  11/09/21    Name Role Begin End    Jermaine Reaves M.D. Anesth 1703 1759        Preop Diagnosis (Free Text):  Pre-op Diagnosis     ANAL FISSURE AND FISTULA        Preop Diagnosis (Codes):    Premium Reason  A. 3PM - 7AM    Comments: ASA 3

## 2021-11-10 NOTE — OR NURSING
Received from pacu at 1901 and home at 1926.  Vss. Not wanting more fluids at this time.  Dressing intact.  Verbalized understanding of dc instructions.  Walked to car/ride home, per patienet's request. Balance steady.

## 2021-11-10 NOTE — OP REPORT
DATE OF SERVICE:  11/09/2021     PREOPERATIVE DIAGNOSIS:  Anal fistula.     POSTOPERATIVE DIAGNOSES:  Anal fissure and anal fistula.     PROCEDURE:  Fistulotomy.     SURGEON:  Garret Mcdonald MD     ASSISTANT:  None.     ANESTHESIA:  General endotracheal anesthesia.     ESTIMATED BLOOD LOSS:  10 mL.     SPECIMENS:  None.     COMPLICATIONS:  None.     CONDITION:  Stable.     INDICATIONS FOR PROCEDURE:  This is a 36-year-old male who presents with a   posterior midline anal fistula associated with an anal fissure.  Risks,   benefits and alternatives of fistulotomy were explained to him and he presents   to the OR for procedure.     OPERATIVE FINDINGS:  A straight tract intersphincteric posterior midline anal   fistula opened widely with fistulotomy with hemostasis.     OPERATIVE TECHNIQUE:  After informed consent was obtained, the patient was   taken to the operating room and placed in supine position.  After adequate   endotracheal anesthesia was achieved, the patient was placed in lithotomy   position and the anus and perineum were prepped and draped in a sterile   fashion.  Operation was begun by performing a digital rectal [place]   retractor exam.  We identified the fistula in the posterior midline.  A   fistula probe was inserted through this and it was noted to be   intersphincteric.  We then cut down on the fistula tract using a cautery,   opening it widely into the intersphincteric plane and dividing the internal   sphincter muscle in the process.  We then debrided the fistula bed, removing   chronic granulation tissue.  The edges were marsupialized with 2-0 Vicryl SH   pops and a local anesthetic block was given with 30 mL of 0.5% Marcaine with   epinephrine.  Surgicel was placed and the procedure concluded.  The patient   was returned to the PACU in stable condition.  All instrument counts were   correct at the end of the procedure.        ______________________________  Garret Mcdonald  MD NERI/KYLER    DD:  11/09/2021 17:51  DT:  11/09/2021 17:59    Job#:  [id number]

## 2021-11-10 NOTE — ANESTHESIA PROCEDURE NOTES
Airway    Date/Time: 11/9/2021 5:11 PM  Performed by: Jermaine Reaves M.D.  Authorized by: Jermaine Reaves M.D.     Location:  OR  Urgency:  Elective  Indications for Airway Management:  Anesthesia      Spontaneous Ventilation: absent    Sedation Level:  Deep  Preoxygenated: Yes    Patient Position:  Ramp  Mask Difficulty Assessment:  0 - not attempted  Final Airway Type:  Endotracheal airway  Final Endotracheal Airway:  ETT  Cuffed: Yes    Technique Used for Successful ETT Placement:  Video laryngoscopy    Insertion Site:  Oral  Blade Type:  Glide  Laryngoscope Blade/Videolaryngoscope Blade Size:  4  ETT Size (mm):  7.5  Measured from:  Lips  ETT to Lips (cm):  23  Placement Verified by: auscultation and capnometry    Cormack-Lehane Classification:  Grade I - full view of glottis  Number of Attempts at Approach:  1   Atraumatic x1 attempt

## 2021-11-15 ENCOUNTER — ANTICOAGULATION VISIT (OUTPATIENT)
Dept: VASCULAR LAB | Facility: MEDICAL CENTER | Age: 36
End: 2021-11-15
Attending: INTERNAL MEDICINE
Payer: COMMERCIAL

## 2021-11-15 DIAGNOSIS — R76.0 LUPUS ANTICOAGULANT POSITIVE: ICD-10-CM

## 2021-11-15 DIAGNOSIS — I63.529 CEREBROVASCULAR ACCIDENT (CVA) DUE TO OCCLUSION OF ANTERIOR CEREBRAL ARTERY, UNSPECIFIED BLOOD VESSEL LATERALITY (HCC): ICD-10-CM

## 2021-11-15 LAB — INR PPP: 2.1 (ref 2–3.5)

## 2021-11-15 PROCEDURE — 99211 OFF/OP EST MAY X REQ PHY/QHP: CPT | Performed by: NURSE PRACTITIONER

## 2021-11-15 PROCEDURE — 85610 PROTHROMBIN TIME: CPT

## 2021-11-15 NOTE — PROGRESS NOTES
Anticoagulation Summary  As of 11/15/2021    INR goal:  2.0-3.0   TTR:  46.8 % (1.2 y)   INR used for dosin.10 (11/15/2021)   Warfarin maintenance plan:  10 mg (5 mg x 2) every Thu; 5 mg (5 mg x 1) all other days   Weekly warfarin total:  40 mg   Plan last modified:  SABINA Alvarez (2021)   Next INR check:  2021   Target end date:  Indefinite    Indications    Lupus anticoagulant positive [R76.0]  Cerebrovascular accident (CVA) due to vascular occlusion (HCC) [I63.9]             Anticoagulation Episode Summary     INR check location:      Preferred lab:      Send INR reminders to:      Comments:        Anticoagulation Care Providers     Provider Role Specialty Phone number    Aayush Parra M.D. Referring Medical Oncology [phone]    [facility] Responsible  [phone]    Julio Gibbons, PharmD Responsible                  Refer to Patient Findings for HPI:  Patient Findings     Positives:  Change in health, Change in activity    Negatives:  Signs/symptoms of thrombosis, Signs/symptoms of bleeding, Laboratory test error suspected, Change in alcohol use, Upcoming invasive procedure, Emergency department visit, Upcoming dental procedure, Missed doses, Extra doses, Change in medications, Change in diet/appetite, Hospital admission, Bruising, Other complaints    Comments:  Had surgery last week for anal fistula. Is bridging with Lovenox. On Miralax now.          There were no vitals filed for this visit.   pt declined vitals    Verified current warfarin dosing schedule.    Medications reconciled   Pt is on ASA 81 mg as antiplatelet therapy for CVA hx and must be reviewed again on - with neurology.      A/P   INR  -therapeutic. INR up to 2.1 today with loading doses. STOP Lovenox.    Warfarin dosing recommendation: Resume your usual regimen.    Pt educated to contact our clinic with any changes in medications or s/s of bleeding or thrombosis. Pt is aware to seek  immediate medical attention for falls, head injury or deep cuts.    Follow up appointment in 2 week(s).    MAXX Alvarez.

## 2021-11-16 LAB — INR BLD: 2.1 (ref 0.9–1.2)

## 2021-11-29 ENCOUNTER — APPOINTMENT (OUTPATIENT)
Dept: VASCULAR LAB | Facility: MEDICAL CENTER | Age: 36
End: 2021-11-29
Attending: INTERNAL MEDICINE
Payer: COMMERCIAL

## 2021-11-30 ENCOUNTER — ANTICOAGULATION VISIT (OUTPATIENT)
Dept: VASCULAR LAB | Facility: MEDICAL CENTER | Age: 36
End: 2021-11-30
Attending: INTERNAL MEDICINE
Payer: COMMERCIAL

## 2021-11-30 DIAGNOSIS — R76.0 LUPUS ANTICOAGULANT POSITIVE: ICD-10-CM

## 2021-11-30 LAB — INR PPP: 2.9 (ref 2–3.5)

## 2021-11-30 PROCEDURE — 99211 OFF/OP EST MAY X REQ PHY/QHP: CPT

## 2021-11-30 PROCEDURE — 85610 PROTHROMBIN TIME: CPT

## 2021-11-30 NOTE — PROGRESS NOTES
OP Anticoagulation Service Note    Date: 2021    Anticoagulation Summary  As of 2021    INR goal:  2.0-3.0   TTR:  48.5 % (1.3 y)   INR used for dosin.90 (2021)   Warfarin maintenance plan:  10 mg (5 mg x 2) every Thu; 5 mg (5 mg x 1) all other days   Weekly warfarin total:  40 mg   Plan last modified:  TOM AlvarezPISAURA (2021)   Next INR check:  2021   Target end date:  Indefinite    Indications    Lupus anticoagulant positive [R76.0]  Cerebrovascular accident (CVA) due to vascular occlusion (HCC) [I63.9]             Anticoagulation Episode Summary     INR check location:      Preferred lab:      Send INR reminders to:      Comments:        Anticoagulation Care Providers     Provider Role Specialty Phone number    Aayush Parra M.D. Referring Medical Oncology [phone]    [facility] Responsible  [phone]    Julio Gibbons, PharmD Responsible          Anticoagulation Patient Findings      HPI:     Christ Hadleyian Marily is in the Anticoagulation Clinic today.     The reason for today's visit is to prevent morbidity and mortality from a blood clot and/or stroke and to reduce the risk of bleeding while on a anticoagulant.     PCP:  Odilia Pereyra M.D.  [address] [facility] [id number] [phone]    Lab Results   Component Value Date/Time    HBA1C 8.4 (H) 2021 10:21 AM      Lab Results   Component Value Date/Time    CHOLSTRLTOT 92 (L) 2021 09:07 AM    LDL 41 2021 09:07 AM    HDL 31 (A) 2021 09:07 AM    TRIGLYCERIDE 100 2021 09:07 AM       Lab Results   Component Value Date/Time    GLUCOSE 174 (H) 2021 10:58 AM    BUN 13 2021 10:58 AM    CREATININE 1.05 2021 10:58 AM     Lab Results   Component Value Date/Time    ALKPHOSPHAT 75 2021 09:07 AM    ASTSGOT 19 2021 09:07 AM    ALTSGPT 20 2021 09:07 AM    TBILIRUBIN 1.1 2021 09:07 AM    INR 2.90 2021 12:00 AM     ALBUMIN 3.5 08/16/2021 09:07 AM      Lab Results   Component Value Date/Time    RBC 4.84 11/09/2021 02:02 PM    HEMOGLOBIN 13.7 (L) 11/09/2021 02:02 PM    HEMATOCRIT 40.6 (L) 11/09/2021 02:02 PM    PLATELETCT 224 11/09/2021 02:02 PM      Lab Results   Component Value Date/Time    MALBCRT see below 08/16/2021 09:07 AM    MICROALBUR <1.2 08/16/2021 09:07 AM       Current Outpatient Medications:   •  polyethylene glycol/lytes, 17 g, Oral, QDAY PRN  •  DILTIAZem 2 %, Insert  into the rectum every day. Pea size Compounded  ointment  •  Lidocaine, 1 Application, Apply externally, 4X/DAY PRN  •  warfarin, Patient to take 10mg on Mon & Wed and 5mg ROW or as directed by the [facility] (Patient taking differently: 5-10 mg, Oral, DAILY, Patient to take 10mg on Mon & Wed and 5mg ROW or as directed by the [facility])  •  VITAMIN D PO, 1 Tablet, Oral, DAILY  •  acetaminophen, 650 mg, Oral, QDAY PRN  •  atorvastatin, Take 1 tablet by mouth nightly  •  glyBURIDE-metFORMIN, TAKE 1 TABLET BY MOUTH IN THE MORNING WITH BREAKFAST (Patient taking differently: 1 Tablet, Oral, EVERY MORNING WITH BREAKFAST, TAKE 1 TABLET BY MOUTH IN THE MORNING WITH BREAKFAST)  •  lisinopril-hydrochlorothiazide, 1 Tablet, Oral, DAILY  •  aspirin, 81 mg, Oral, DAILY    Assessment:     • INR  therapeutic.   • Is pt on antiplatelet therapy: yes  • Is pt on NSAID: no    3 vitals included with today's appt-unless patient declined:  (BP, weight, ht, RR)   There were no vitals filed for this visit.    Plan:     • Continue the same warfarin dose, as noted above.       Follow-up:     • Test in 3 week(s).    • This decision was made using shared decision making with the pt and benefits vs risks were discussed.      Additional information discussed with patient:     • Pt educated to contact our clinic with any changes in medications or s/s of bleeding or thrombosis.  • Education was provided today regarding tips to reduce their bleed risk and  dietary constraints while on a anticoagulant.    National recommendations regarding anticoagulation therapy:     The CHEST guidelines recommends frequent monitoring at regular intervals for any patient on a anticoagulant, to ensure the patient is on the proper dose, and to monitor that they are not having any complications from therapy.       Reggie Michele, PharmD, MS, BCACP, [facility]  Phone [phone] fax [phone]    This note was created using voice recognition software (Dragon). The accuracy of the dictation is limited by the abilities of the software. I have reviewed the note prior to signing, however some errors in grammar and context are still possible. If you have any questions related to this note please do not hesitate to contact our office.

## 2021-12-01 LAB — INR BLD: 2.9 (ref 0.9–1.2)

## 2021-12-20 ENCOUNTER — APPOINTMENT (OUTPATIENT)
Dept: VASCULAR LAB | Facility: MEDICAL CENTER | Age: 36
End: 2021-12-20
Attending: INTERNAL MEDICINE
Payer: COMMERCIAL

## 2021-12-21 ENCOUNTER — ANTICOAGULATION VISIT (OUTPATIENT)
Dept: VASCULAR LAB | Facility: MEDICAL CENTER | Age: 36
End: 2021-12-21
Attending: INTERNAL MEDICINE
Payer: COMMERCIAL

## 2021-12-21 VITALS — HEART RATE: 87 BPM | DIASTOLIC BLOOD PRESSURE: 89 MMHG | SYSTOLIC BLOOD PRESSURE: 121 MMHG

## 2021-12-21 DIAGNOSIS — R76.0 LUPUS ANTICOAGULANT POSITIVE: ICD-10-CM

## 2021-12-21 LAB
INR BLD: 2.7 (ref 0.9–1.2)
INR PPP: 2.7 (ref 2–3.5)

## 2021-12-21 PROCEDURE — 85610 PROTHROMBIN TIME: CPT

## 2021-12-21 PROCEDURE — 99211 OFF/OP EST MAY X REQ PHY/QHP: CPT

## 2021-12-21 NOTE — PROGRESS NOTES
Anticoagulation Summary  As of 2021    INR goal:  2.0-3.0   TTR:  50.8 % (1.3 y)   INR used for dosin.70 (2021)   Warfarin maintenance plan:  10 mg (5 mg x 2) every Thu; 5 mg (5 mg x 1) all other days   Weekly warfarin total:  40 mg   Plan last modified:  SABINA Alvarez (2021)   Next INR check:  2022   Target end date:  Indefinite    Indications    Lupus anticoagulant positive [R76.0]  Cerebrovascular accident (CVA) due to vascular occlusion (HCC) [I63.9]             Anticoagulation Episode Summary     INR check location:      Preferred lab:      Send INR reminders to:      Comments:        Anticoagulation Care Providers     Provider Role Specialty Phone number    Aayush Parra M.D. Referring Medical Oncology [phone]    [facility] Responsible  [phone]    Armando MarshallD Responsible                  Refer to Patient Findings for HPI:  Patient Findings     Negatives:  Signs/symptoms of thrombosis, Signs/symptoms of bleeding, Laboratory test error suspected, Change in health, Change in alcohol use, Change in activity, Upcoming invasive procedure, Emergency department visit, Upcoming dental procedure, Missed doses, Extra doses, Change in medications, Change in diet/appetite, Hospital admission, Bruising, Other complaints          Vitals:    21 0806   BP: 121/89   Pulse: 87       Verified current warfarin dosing schedule.    Medications reconciled   Pt is on ASA 81 mg as antiplatelet therapy for CVA hx and must be reviewed again on - with neurology.      A/P   INR  therapeutic.     Warfarin dosing recommendation: Instructed pt to continue on with current regimen.    Pt educated to contact our clinic with any changes in medications or s/s of bleeding or thrombosis. Pt is aware to seek immediate medical attention for falls, head injury or deep cuts.    Follow up appointment in 4 week(s).    Aj Stapleton, PharmD, BCACP

## 2022-01-11 ENCOUNTER — APPOINTMENT (OUTPATIENT)
Dept: MEDICAL GROUP | Facility: PHYSICIAN GROUP | Age: 37
End: 2022-01-11
Payer: COMMERCIAL

## 2022-01-11 NOTE — PROGRESS NOTES
Subjective:     CC: No chief complaint on file.        HPI:   Christ presents today to discuss the following issues:        Past Medical History:   Diagnosis Date   • Acute midline low back pain with left-sided sciatica 9/3/2020   • Anxiety 2020   • Arthritis    • Blood clotting disorder (HCC) 2019    blood clot in brain   • Diabetes (HCC)    • Heart burn    • Hemorrhagic disorder (HCC)     anticoagulant lupus   • Hypertension     first  discovered    • Obesities, morbid (HCC)     since childhood   • JAYLIN (obstructive sleep apnea) 2020   • Seizure (HCC) 2019    only one seizure, 2019   • Smoking 7/10/2014   • Stroke (HCC)        Social History     Tobacco Use   • Smoking status: Former Smoker     Packs/day: 1.00     Years: 13.00     Pack years: 13.00     Types: Cigarettes     Start date: 2006     Quit date: 2019     Years since quittin.9   • Smokeless tobacco: Never Used   Vaping Use   • Vaping Use: Never used   Substance Use Topics   • Alcohol use: No   • Drug use: Yes     Types: Inhaled     Comment: smokes marajuana        Current Outpatient Medications Ordered in Epic   Medication Sig Dispense Refill   • polyethylene glycol/lytes (MIRALAX) 17 g Pack Take 17 g by mouth 1 time a day as needed.     • dilTIAZem HCl (DILTIAZEM 2 %) Insert  into the rectum every day. Pea size  Compounded  ointment     • Lidocaine 5 % Cream Apply 1 Application topically 4 times a day as needed (pain). 500 g 3   • warfarin (COUMADIN) 5 MG Tab Patient to take 10mg on Mon & Wed and 5mg ROW or as directed by the [facility] (Patient taking differently: Take 5-10 mg by mouth every day. Patient to take 10mg on Mon & Wed and 5mg ROW or as directed by the [facility]) 60 Tablet 2   • VITAMIN D PO Take 1 Tablet by mouth every day.     • acetaminophen (TYLENOL) 325 MG Tab Take 650 mg by mouth 1 time a day as needed.     • atorvastatin (LIPITOR) 20 MG Tab Take 1 tablet by mouth nightly 90  tablet 3   • glyBURIDE-metFORMIN (GLUCOVANCE) 5-500 MG Tab TAKE 1 TABLET BY MOUTH IN THE MORNING WITH BREAKFAST (Patient taking differently: Take 1 Tablet by mouth every morning with breakfast. TAKE 1 TABLET BY MOUTH IN THE MORNING WITH BREAKFAST) 90 tablet 3   • lisinopril-hydrochlorothiazide (PRINZIDE) 20-25 MG per tablet Take 1 tablet by mouth every day. 90 tablet 3   • aspirin (ASA) 81 MG Chew Tab chewable tablet Take 1 Tab by mouth every day. 100 Tab 5     No current Epic-ordered facility-administered medications on file.       Allergies:  Patient has no known allergies.    Health Maintenance: Completed    ROS:   Denies any recent fevers or chills. No nausea or vomiting. No chest pains or shortness of breath.      Objective:       Exam:  There were no vitals taken for this visit. There is no height or weight on file to calculate BMI.    Gen: Alert and oriented, No apparent distress.  Lungs: Normal effort, CTA bilaterally, no wheezes, rhonchi, or rales  CV: Regular rate and rhythm. No murmurs, rubs, or gallops.  Ext: No clubbing, cyanosis, edema.      Assessment & Plan:     36 y.o. male with the following -         I spent a total of *** minutes with record review, exam, communication with the patient, communication with other providers, and documentation of this encounter.      No follow-ups on file.    Please note that this dictation was created using voice recognition software. I have made every reasonable attempt to correct obvious errors, but I expect that there are errors of grammar and possibly content that I did not discover before finalizing the note.

## 2022-01-18 ENCOUNTER — TELEPHONE (OUTPATIENT)
Dept: VASCULAR LAB | Facility: MEDICAL CENTER | Age: 37
End: 2022-01-18

## 2022-01-18 ENCOUNTER — APPOINTMENT (OUTPATIENT)
Dept: VASCULAR LAB | Facility: MEDICAL CENTER | Age: 37
End: 2022-01-18
Payer: COMMERCIAL

## 2022-01-18 NOTE — TELEPHONE ENCOUNTER
"Received call from pt that he changed his insurance from [employer] insurance to [employer] and is inquiring if we take his new insurance    Per Erica Peña PAR: \"it is oon. We can still see them. but the insurance may not cover much.\"    S/w pt regarding update above. Discussed cheaper alternative will be monitoring via lab but pt needs to call insurance to determine which lab facility is contracted with his insurance    Pt will keep his appt tomorrow but will call back when he makes an alternative decision    Eloisa Mcghee, PharmD    "

## 2022-01-20 ENCOUNTER — ANTICOAGULATION MONITORING (OUTPATIENT)
Dept: VASCULAR LAB | Facility: MEDICAL CENTER | Age: 37
End: 2022-01-20

## 2022-01-20 ENCOUNTER — TELEPHONE (OUTPATIENT)
Dept: VASCULAR LAB | Facility: MEDICAL CENTER | Age: 37
End: 2022-01-20

## 2022-01-20 DIAGNOSIS — R76.0 LUPUS ANTICOAGULANT POSITIVE: ICD-10-CM

## 2022-01-20 NOTE — PROGRESS NOTES
Discharged from [facility].  Pt reports his insurance is now out of network.  Pt has appt to establish care with his new pcp in 72 hours.  New PCP will manage his anticoagulation.  Silvia Petty, Clinical Pharmacist, CDE, CACP

## 2022-01-20 NOTE — TELEPHONE ENCOUNTER
Left voicemail message to reschedule missed anticoagulation services visit.  Silvia Petty, Clinical Pharmacist, CDE, CACP

## 2022-03-28 DIAGNOSIS — R76.0 LUPUS ANTICOAGULANT POSITIVE: ICD-10-CM

## 2022-03-28 RX ORDER — WARFARIN SODIUM 5 MG/1
TABLET ORAL
Qty: 60 TABLET | Refills: 0 | OUTPATIENT
Start: 2022-03-28

## 2022-03-30 DIAGNOSIS — R76.0 LUPUS ANTICOAGULANT POSITIVE: ICD-10-CM

## 2022-03-30 RX ORDER — WARFARIN SODIUM 5 MG/1
TABLET ORAL
Qty: 60 TABLET | Refills: 0 | OUTPATIENT
Start: 2022-03-30

## 2022-04-16 ENCOUNTER — HOSPITAL ENCOUNTER (EMERGENCY)
Facility: MEDICAL CENTER | Age: 37
End: 2022-04-17
Attending: EMERGENCY MEDICINE
Payer: OTHER MISCELLANEOUS

## 2022-04-16 DIAGNOSIS — R13.13 PHARYNGEAL DYSPHAGIA: ICD-10-CM

## 2022-04-16 DIAGNOSIS — T50.905A PILL ESOPHAGITIS: ICD-10-CM

## 2022-04-16 DIAGNOSIS — K20.80 PILL ESOPHAGITIS: ICD-10-CM

## 2022-04-16 PROCEDURE — 99282 EMERGENCY DEPT VISIT SF MDM: CPT

## 2022-04-16 ASSESSMENT — FIBROSIS 4 INDEX: FIB4 SCORE: 0.74

## 2022-04-17 VITALS
HEART RATE: 91 BPM | TEMPERATURE: 98.6 F | DIASTOLIC BLOOD PRESSURE: 87 MMHG | SYSTOLIC BLOOD PRESSURE: 131 MMHG | BODY MASS INDEX: 45.1 KG/M2 | HEIGHT: 70 IN | RESPIRATION RATE: 16 BRPM | OXYGEN SATURATION: 98 % | WEIGHT: 315 LBS

## 2022-04-17 NOTE — ED NOTES
Report received from prior RN. Pt Aox4. Resp normal/unlabored. Bed side rails up/in low position. Pt updated to POC and all questions answered.     ERP at bedside

## 2022-04-17 NOTE — ED NOTES
Pt discharged, all appropriate hospital equipment removed (IV, monitor, pulse ox, etc.). Pt left unit via walking to vehicle for home. Personal belongings with pt when leaving unit. Pt given discharge instructions prior to leaving unit including where to  prescriptions and when to follow-up; verbalizes understanding. Pt informed to return to ED if symptoms worsen/return or altered status develop. Copy of discharge instructions signed and turned into DC basket and copy sent with pt. F/U with internal medicine and GI

## 2022-04-17 NOTE — ED TRIAGE NOTES
"Chief Complaint   Patient presents with   • Difficulty Swallowing     PT reports difficulty swallowing getting worse over past month.  PT reports feels like his evening meds are stuck in his throat.     .Blood Pressure 146/99   Pulse 89   Temperature 35.8 °C (96.5 °F) (Temporal)   Respiration 18   Height 1.778 m (5' 10\")   Weight (Abnormal) 182 kg (401 lb 3.8 oz)   Oxygen Saturation 98% Comment: RA  Body Mass Index 57.57 kg/m²     "

## 2022-04-17 NOTE — ED PROVIDER NOTES
ED Provider Note    CHIEF COMPLAINT  Chief Complaint   Patient presents with   • Difficulty Swallowing     PT reports difficulty swallowing getting worse over past month.  PT reports feels like his evening meds are stuck in his throat.       HPI  Christ Calixto is a 37 y.o. male who presents for evaluation of he notes that he was swallowing his warfarin tonight when he feels he got stuck in the middle of his throat.  Patient points just to the left of his larynx when indicating where he feels the pill was stuck.  He noted that he attempted to drink water but it took quite some time for the sensation that the pill was stuck there to go away.  He notes no hematemesis or hemoptysis and no difficulty phonating or swallowing fluids or solids otherwise.  He does note this occasional feeling of getting a pill stuck in his throat over the past month or 2.  He has not been evaluated by ENT or GI in the past.    REVIEW OF SYSTEMS  GEN: No fevers or chills  SKIN: No rashes  HEENT: No ear pain, ringing in ears, or decreased hearing. No sore throat, or runny nose  NECK: No neck pain or stiffness  CHEST: No pain   PULM: No shortness of breath, cough, wheezing, stridor, or pain with inspiration/expiration  GI: No nausea, vomiting, diarrhea, or constipation  NEURO: No sensory or motor changes.  No numbness, tingling, or focal motor weakness to extremities.          PAST MEDICAL HISTORY   has a past medical history of Acute midline low back pain with left-sided sciatica (9/3/2020), Anxiety (2/18/2020), Arthritis, Blood clotting disorder ([facility]) (2019), Diabetes ([facility]), Heart burn, Hemorrhagic disorder ([facility]), Hypertension, Obesities, morbid ([facility]), JAYLIN (obstructive sleep apnea) (1/30/2020), Seizure ([facility]) (12/30/2019), Smoking (7/10/2014), and Stroke ([facility]).    SOCIAL HISTORY  Social History     Tobacco Use   • Smoking status: Former Smoker     Packs/day: 1.00     Years: 13.00     Pack years: 13.00     Types: Cigarettes     Start  "date: 9/28/2006     Quit date: 1/30/2019     Years since quitting: 3.2   • Smokeless tobacco: Never Used   Vaping Use   • Vaping Use: Never used   Substance and Sexual Activity   • Alcohol use: No   • Drug use: Yes     Types: Inhaled     Comment: smokes marajuana    • Sexual activity: Not Currently       SURGICAL HISTORY   has a past surgical history that includes sigmoidoscopy,diagnostic (N/A, 10/12/2021) and removal anal fistula,subcutaneous (N/A, 11/9/2021).    CURRENT MEDICATIONS  Home Medications    **Home medications have not yet been reviewed for this encounter**         ALLERGIES  No Known Allergies    PHYSICAL EXAM  VITAL SIGNS: /87   Pulse 91   Temp 37 °C (98.6 °F) (Temporal)   Resp 16   Ht 1.778 m (5' 10\")   Wt (!) 182 kg (401 lb 3.8 oz)   SpO2 98%   BMI 57.57 kg/m²    GEN: Alert in no apparent distress.  SKIN: Warm, Dry, No erythema, No rash.  HEENT: Normocephalic, Atraumatic, Bilateral external ears normal. Nose normal. Pupils are equal and reactive. Conjunctiva normal, non-icteric.  Posterior pharynx nonerythemic with no lesions or asymmetry.  Soft palate elevates normally with phonation.  Normal tracheal excursion with swallowing.  GI: No distention  CV: Regular rate and rythm, no murmurs.    PULM: Clear to auscultation bilaterally.  NEURO: Alert, Grossly non-focal.  No facial droop or tongue deviation.  No slurred speech or dysarthria  PSYCH: Affect normal, Judgment normal, Mood normal, Appears appropriate and not intoxicated.           COURSE & MEDICAL DECISION MAKING  Patient arrives for evaluation of what appears to be a brief case of pill esophagitis.  He does note that he has been having similar symptoms over the past month or 2 and may have some form of stricture but does not appear septic or toxic and has no palpable masses or posterior pharynx abnormalities on exam.  I did discuss options with the patient and I felt there was reasonable cause to perform CT imaging, however the " patient states that he is not sure his insurance will cover it and did not want any further work-up.  He did want referral to a GI specialist but stated understanding that we could give him the name and number of our GI specialist for him to call but it may be sometime before he can get in.  I felt following up with his primary care physician and getting a referral from them was probably the better way to do it.  At this point I do not feel it needs to happen emergently and I did not feel further labs would benefit the patient or  tonight.  Patient will return if his symptoms worsen or change in any way and otherwise follow-up with his primary care physician.  FINAL IMPRESSION  1. Pill esophagitis    2. Pharyngeal dysphagia             Electronically signed by: Vel Cary M.D., 4/17/2022 12:50 AM

## 2024-05-06 ENCOUNTER — NON-PROVIDER VISIT (OUTPATIENT)
Dept: WOUND CARE | Facility: MEDICAL CENTER | Age: 39
End: 2024-05-06
Attending: NURSE PRACTITIONER
Payer: COMMERCIAL

## 2024-05-06 RX ORDER — CIPROFLOXACIN 500 MG/1
500 TABLET, FILM COATED ORAL 2 TIMES DAILY
Status: ON HOLD | COMMUNITY
Start: 2024-04-30 | End: 2024-05-13

## 2024-05-06 RX ORDER — HYDROCODONE BITARTRATE AND ACETAMINOPHEN 5; 325 MG/1; MG/1
1 TABLET ORAL EVERY 4 HOURS PRN
COMMUNITY
Start: 2024-05-01 | End: 2024-05-10

## 2024-05-06 NOTE — PATIENT INSTRUCTIONS
-Keep your wound dressing clean, dry, and intact.     -Should you experience any significant changes in your wound(s), such as infection (redness, swelling, localized heat, increased pain, fever > 101 F, chills) or have any questions regarding your home care instructions, please contact the wound center at [phone]. If after hours, contact your primary care physician or go to the hospital emergency room.

## 2024-05-06 NOTE — CERTIFICATION
Non Provider Encounter- Full Thickness Wound      HISTORY OF PRESENT ILLNESS  Wound History:    START OF CARE IN CLINIC: 5/6/24    REFERRING PROVIDER: SABINA Eisenberg   WOUND- Full Thickness Wound   LOCATION: Left lateral lower extremity   HISTORY: Pt referred for nonhealing wound to left lower leg since February of this year. Pt reports he had a small scab in the area, unsure of etiology, then accidentally scratched it when itching leg. Pt has antiphospholipid syndrome, Diabetes with a recent A1C of 7.5, and smokes a quarter to half a pack of cigarettes daily. Pt reports he has tried vaseline and neosporin with bandages, but says they made pain and drainage much worse. Pt just finished course of ciprofloxacin this morning.     Assessment 5/6/24: Pt's wound presents as dry, thick, firm stable eschar. There is no drainage or fluctuance. Edges are well attached. Blanchable erythema around wound likely chronic, no heat or localized edema noted.       Pertinent Medical History: Diabetes type 2, Antiphospholipid syndrome, tobacco use,  CVA, HTN     DIABETES HX: Currently managing with Glucovance.  Checks blood sugars 1-2 times daily and reports that these typically run around 70s-150s.  Has had previous diabetes education. Does check feet routinely.     TOBACCO USE: 3-10 cigarettes a day    Pertinent Labs and Diagnostics:    Labs:     **Per Referral, pt's most recent A1C is 7.5**    A1c:   Lab Results   Component Value Date/Time    HBA1C 8.4 (H) 09/28/2021 10:21 AM          IMAGING: None    LAST  WOUND CULTURE:  None          Patient allergies and medications reviewed via Epic.     WOUND ASSESSMENT-      Procedures:  No procedures performed.            Wound 05/06/24 Leg Lateral Left (Active)   Wound Image   05/06/24 1300   Site Assessment Brown;Dry;Eschar 05/06/24 1300   Periwound Assessment Blanchable erythema;Edema 05/06/24 1300   Margins Attached edges 05/06/24 1300   Drainage Amount None 05/06/24 1300    Treatments Cleansed 05/06/24 1300   Wound Cleansing Povidone-Iodine 05/06/24 1300   Periwound Protectant Not Applicable 05/06/24 1300   Dressing Cleansing/Solutions Other (Comments) 05/06/24 1300   Dressing Options Open to Air;Tubigrip 05/06/24 1300   Dressing Change/Treatment Frequency Daily, and As Needed 05/06/24 1300   Wound Team Following Weekly 05/06/24 1300   Wound Length (cm) 2.5 cm 05/06/24 1300   Wound Width (cm) 2.3 cm 05/06/24 1300   Wound Surface Area (cm^2) 5.75 cm^2 05/06/24 1300   Wound Odor None 05/06/24 1300   Exposed Structures None 05/06/24 1300       PATIENT EDUCATION  - Importance of tight glucose control for wound healing   - Implications of loss of protective sensation (LOPS) discussed with patient- including increased risk for amputation.  - Advised to check feet at least daily, moisturize feet, and to always wear protective foot wear.   -Seek medical care if you experience signs of infection such as redness, periwound heat, new or increased pain, fever, chills.  WOUND CARE:  -Wound is a stable, well attached, nondraining eschar. Apply povodine iodine to wound daily and allow to dry. Then place tubigrip from base of toes to two fingers width below knee to manage edema.

## 2024-05-10 ENCOUNTER — APPOINTMENT (OUTPATIENT)
Dept: RADIOLOGY | Facility: MEDICAL CENTER | Age: 39
DRG: 871 | End: 2024-05-10
Attending: EMERGENCY MEDICINE
Payer: COMMERCIAL

## 2024-05-10 ENCOUNTER — HOSPITAL ENCOUNTER (INPATIENT)
Facility: MEDICAL CENTER | Age: 39
LOS: 2 days | DRG: 871 | End: 2024-05-13
Attending: EMERGENCY MEDICINE | Admitting: STUDENT IN AN ORGANIZED HEALTH CARE EDUCATION/TRAINING PROGRAM
Payer: COMMERCIAL

## 2024-05-10 DIAGNOSIS — N17.9 SEPSIS WITH ACUTE RENAL FAILURE AND SEPTIC SHOCK, DUE TO UNSPECIFIED ORGANISM, UNSPECIFIED ACUTE RENAL FAILURE TYPE (HCC): Primary | ICD-10-CM

## 2024-05-10 DIAGNOSIS — R65.21 SEPSIS WITH ACUTE RENAL FAILURE AND SEPTIC SHOCK, DUE TO UNSPECIFIED ORGANISM, UNSPECIFIED ACUTE RENAL FAILURE TYPE (HCC): Primary | ICD-10-CM

## 2024-05-10 DIAGNOSIS — E80.6 HYPERBILIRUBINEMIA: ICD-10-CM

## 2024-05-10 DIAGNOSIS — E66.01 MORBID OBESITY (HCC): ICD-10-CM

## 2024-05-10 DIAGNOSIS — E87.20 LACTIC ACIDOSIS: ICD-10-CM

## 2024-05-10 DIAGNOSIS — R00.0 SINUS TACHYCARDIA: ICD-10-CM

## 2024-05-10 DIAGNOSIS — G47.33 OSA (OBSTRUCTIVE SLEEP APNEA): ICD-10-CM

## 2024-05-10 DIAGNOSIS — A41.9 SEPSIS WITH ACUTE RENAL FAILURE AND SEPTIC SHOCK, DUE TO UNSPECIFIED ORGANISM, UNSPECIFIED ACUTE RENAL FAILURE TYPE (HCC): Primary | ICD-10-CM

## 2024-05-10 DIAGNOSIS — Z51.89 VISIT FOR WOUND CHECK: ICD-10-CM

## 2024-05-10 DIAGNOSIS — N17.9 AKI (ACUTE KIDNEY INJURY) (HCC): ICD-10-CM

## 2024-05-10 DIAGNOSIS — R74.01 TRANSAMINITIS: ICD-10-CM

## 2024-05-10 DIAGNOSIS — E86.0 DEHYDRATION: ICD-10-CM

## 2024-05-10 DIAGNOSIS — I95.9 HYPOTENSION, UNSPECIFIED HYPOTENSION TYPE: ICD-10-CM

## 2024-05-10 DIAGNOSIS — K72.90: ICD-10-CM

## 2024-05-10 DIAGNOSIS — Z51.89 WOUND CHECK, ABSCESS: ICD-10-CM

## 2024-05-10 DIAGNOSIS — K75.4 AUTOIMMUNE HEPATITIS (HCC): ICD-10-CM

## 2024-05-10 DIAGNOSIS — R74.8 ELEVATED LIVER ENZYMES: ICD-10-CM

## 2024-05-10 LAB
ALBUMIN SERPL BCP-MCNC: 3.4 G/DL (ref 3.2–4.9)
ALBUMIN/GLOB SERPL: 0.8 G/DL
ALP SERPL-CCNC: 121 U/L (ref 30–99)
ALT SERPL-CCNC: 1987 U/L (ref 2–50)
ANION GAP SERPL CALC-SCNC: 16 MMOL/L (ref 7–16)
APPEARANCE UR: ABNORMAL
AST SERPL-CCNC: 1370 U/L (ref 12–45)
BACTERIA #/AREA URNS HPF: NEGATIVE /HPF
BASOPHILS # BLD AUTO: 0.8 % (ref 0–1.8)
BASOPHILS # BLD: 0.05 K/UL (ref 0–0.12)
BILIRUB SERPL-MCNC: 5.4 MG/DL (ref 0.1–1.5)
BILIRUB UR QL STRIP.AUTO: ABNORMAL
BUN SERPL-MCNC: 21 MG/DL (ref 8–22)
CALCIUM ALBUM COR SERPL-MCNC: 9.1 MG/DL (ref 8.5–10.5)
CALCIUM SERPL-MCNC: 8.6 MG/DL (ref 8.5–10.5)
CHLORIDE SERPL-SCNC: 95 MMOL/L (ref 96–112)
CO2 SERPL-SCNC: 21 MMOL/L (ref 20–33)
COLOR UR: ABNORMAL
CREAT SERPL-MCNC: 1.67 MG/DL (ref 0.5–1.4)
EKG IMPRESSION: NORMAL
EOSINOPHIL # BLD AUTO: 0.01 K/UL (ref 0–0.51)
EOSINOPHIL NFR BLD: 0.2 % (ref 0–6.9)
EPI CELLS #/AREA URNS HPF: ABNORMAL /HPF
ERYTHROCYTE [DISTWIDTH] IN BLOOD BY AUTOMATED COUNT: 40.4 FL (ref 35.9–50)
GFR SERPLBLD CREATININE-BSD FMLA CKD-EPI: 52 ML/MIN/1.73 M 2
GLOBULIN SER CALC-MCNC: 4.2 G/DL (ref 1.9–3.5)
GLUCOSE SERPL-MCNC: 142 MG/DL (ref 65–99)
GLUCOSE UR STRIP.AUTO-MCNC: NEGATIVE MG/DL
HCT VFR BLD AUTO: 46.8 % (ref 42–52)
HGB BLD-MCNC: 16.8 G/DL (ref 14–18)
HYALINE CASTS #/AREA URNS LPF: ABNORMAL /LPF
IMM GRANULOCYTES # BLD AUTO: 0.04 K/UL (ref 0–0.11)
IMM GRANULOCYTES NFR BLD AUTO: 0.6 % (ref 0–0.9)
INR PPP: 1.51 (ref 0.87–1.13)
KETONES UR STRIP.AUTO-MCNC: ABNORMAL MG/DL
LACTATE SERPL-SCNC: 3.9 MMOL/L (ref 0.5–2)
LEUKOCYTE ESTERASE UR QL STRIP.AUTO: ABNORMAL
LYMPHOCYTES # BLD AUTO: 0.45 K/UL (ref 1–4.8)
LYMPHOCYTES NFR BLD: 7 % (ref 22–41)
MCH RBC QN AUTO: 29.2 PG (ref 27–33)
MCHC RBC AUTO-ENTMCNC: 35.9 G/DL (ref 32.3–36.5)
MCV RBC AUTO: 81.4 FL (ref 81.4–97.8)
MICRO URNS: ABNORMAL
MONOCYTES # BLD AUTO: 0.24 K/UL (ref 0–0.85)
MONOCYTES NFR BLD AUTO: 3.8 % (ref 0–13.4)
NEUTROPHILS # BLD AUTO: 5.61 K/UL (ref 1.82–7.42)
NEUTROPHILS NFR BLD: 87.6 % (ref 44–72)
NITRITE UR QL STRIP.AUTO: POSITIVE
NRBC # BLD AUTO: 0 K/UL
NRBC BLD-RTO: 0 /100 WBC (ref 0–0.2)
PH UR STRIP.AUTO: 5 [PH] (ref 5–8)
PLATELET # BLD AUTO: 162 K/UL (ref 164–446)
PMV BLD AUTO: 9.8 FL (ref 9–12.9)
POTASSIUM SERPL-SCNC: 3.7 MMOL/L (ref 3.6–5.5)
PROT SERPL-MCNC: 7.6 G/DL (ref 6–8.2)
PROT UR QL STRIP: 30 MG/DL
PROTHROMBIN TIME: 18.4 SEC (ref 12–14.6)
RBC # BLD AUTO: 5.75 M/UL (ref 4.7–6.1)
RBC # URNS HPF: ABNORMAL /HPF
RBC UR QL AUTO: NEGATIVE
SODIUM SERPL-SCNC: 132 MMOL/L (ref 135–145)
SP GR UR STRIP.AUTO: 1.03
UROBILINOGEN UR STRIP.AUTO-MCNC: 2 MG/DL
WBC # BLD AUTO: 6.4 K/UL (ref 4.8–10.8)
WBC #/AREA URNS HPF: ABNORMAL /HPF

## 2024-05-10 RX ORDER — SODIUM CHLORIDE 9 MG/ML
1000 INJECTION, SOLUTION INTRAVENOUS ONCE
Status: COMPLETED | OUTPATIENT
Start: 2024-05-10 | End: 2024-05-11

## 2024-05-10 RX ORDER — CHOLECALCIFEROL (VITAMIN D3) 50 MCG
2000 TABLET ORAL DAILY
COMMUNITY

## 2024-05-10 RX ORDER — WARFARIN SODIUM 5 MG/1
1 TABLET ORAL DAILY
COMMUNITY

## 2024-05-10 RX ORDER — SODIUM CHLORIDE 9 MG/ML
1000 INJECTION, SOLUTION INTRAVENOUS ONCE
Status: COMPLETED | OUTPATIENT
Start: 2024-05-10 | End: 2024-05-10

## 2024-05-10 RX ADMIN — SODIUM CHLORIDE 1000 ML: 9 INJECTION, SOLUTION INTRAVENOUS at 23:15

## 2024-05-10 ASSESSMENT — PAIN DESCRIPTION - PAIN TYPE: TYPE: ACUTE PAIN

## 2024-05-11 ENCOUNTER — APPOINTMENT (OUTPATIENT)
Dept: RADIOLOGY | Facility: MEDICAL CENTER | Age: 39
DRG: 871 | End: 2024-05-11
Attending: EMERGENCY MEDICINE
Payer: COMMERCIAL

## 2024-05-11 PROBLEM — N17.9 AKI (ACUTE KIDNEY INJURY) (HCC): Status: ACTIVE | Noted: 2024-05-11

## 2024-05-11 PROBLEM — E83.42 HYPOMAGNESEMIA: Status: ACTIVE | Noted: 2024-05-11

## 2024-05-11 PROBLEM — N17.9 SEPSIS WITH ACUTE RENAL FAILURE AND SEPTIC SHOCK, DUE TO UNSPECIFIED ORGANISM, UNSPECIFIED ACUTE RENAL FAILURE TYPE (HCC): Status: ACTIVE | Noted: 2024-05-11

## 2024-05-11 PROBLEM — E87.20 LACTIC ACIDOSIS: Status: ACTIVE | Noted: 2024-05-11

## 2024-05-11 PROBLEM — A41.9 SEPSIS WITH ACUTE RENAL FAILURE AND SEPTIC SHOCK, DUE TO UNSPECIFIED ORGANISM, UNSPECIFIED ACUTE RENAL FAILURE TYPE (HCC): Status: ACTIVE | Noted: 2024-05-11

## 2024-05-11 PROBLEM — K72.00 SHOCK LIVER: Status: ACTIVE | Noted: 2024-05-11

## 2024-05-11 PROBLEM — R65.21 SEPSIS WITH ACUTE RENAL FAILURE AND SEPTIC SHOCK, DUE TO UNSPECIFIED ORGANISM, UNSPECIFIED ACUTE RENAL FAILURE TYPE (HCC): Status: ACTIVE | Noted: 2024-05-11

## 2024-05-11 PROBLEM — D68.61 ANTIPHOSPHOLIPID SYNDROME (HCC): Status: ACTIVE | Noted: 2020-08-21

## 2024-05-11 LAB
ALBUMIN SERPL BCP-MCNC: 2.9 G/DL (ref 3.2–4.9)
ALBUMIN/GLOB SERPL: 0.9 G/DL
ALP SERPL-CCNC: 94 U/L (ref 30–99)
ALT SERPL-CCNC: 1246 U/L (ref 2–50)
ANION GAP SERPL CALC-SCNC: 12 MMOL/L (ref 7–16)
APAP SERPL-MCNC: <5 UG/ML (ref 10–30)
AST SERPL-CCNC: 787 U/L (ref 12–45)
BILIRUB SERPL-MCNC: 4.9 MG/DL (ref 0.1–1.5)
BUN SERPL-MCNC: 22 MG/DL (ref 8–22)
CALCIUM ALBUM COR SERPL-MCNC: 8.3 MG/DL (ref 8.5–10.5)
CALCIUM SERPL-MCNC: 7.4 MG/DL (ref 8.5–10.5)
CHLORIDE SERPL-SCNC: 101 MMOL/L (ref 96–112)
CK SERPL-CCNC: 112 U/L (ref 0–154)
CO2 SERPL-SCNC: 18 MMOL/L (ref 20–33)
CREAT SERPL-MCNC: 1.32 MG/DL (ref 0.5–1.4)
ERYTHROCYTE [DISTWIDTH] IN BLOOD BY AUTOMATED COUNT: 41.8 FL (ref 35.9–50)
GFR SERPLBLD CREATININE-BSD FMLA CKD-EPI: 69 ML/MIN/1.73 M 2
GLOBULIN SER CALC-MCNC: 3.3 G/DL (ref 1.9–3.5)
GLUCOSE BLD STRIP.AUTO-MCNC: 199 MG/DL (ref 65–99)
GLUCOSE BLD STRIP.AUTO-MCNC: 238 MG/DL (ref 65–99)
GLUCOSE BLD STRIP.AUTO-MCNC: 258 MG/DL (ref 65–99)
GLUCOSE BLD STRIP.AUTO-MCNC: 288 MG/DL (ref 65–99)
GLUCOSE BLD STRIP.AUTO-MCNC: 339 MG/DL (ref 65–99)
GLUCOSE SERPL-MCNC: 241 MG/DL (ref 65–99)
HAV IGM SERPL QL IA: NORMAL
HBV CORE IGM SER QL: NORMAL
HBV SURFACE AG SER QL: NORMAL
HCT VFR BLD AUTO: 39.6 % (ref 42–52)
HCV AB SER QL: NORMAL
HGB BLD-MCNC: 13.8 G/DL (ref 14–18)
HIV 1+2 AB+HIV1 P24 AG SERPL QL IA: NORMAL
LACTATE SERPL-SCNC: 1.9 MMOL/L (ref 0.5–2)
LACTATE SERPL-SCNC: 2.6 MMOL/L (ref 0.5–2)
LIPASE SERPL-CCNC: 53 U/L (ref 11–82)
MAGNESIUM SERPL-MCNC: 1.3 MG/DL (ref 1.5–2.5)
MAGNESIUM SERPL-MCNC: 1.9 MG/DL (ref 1.5–2.5)
MCH RBC QN AUTO: 28.9 PG (ref 27–33)
MCHC RBC AUTO-ENTMCNC: 34.8 G/DL (ref 32.3–36.5)
MCV RBC AUTO: 83 FL (ref 81.4–97.8)
PHOSPHATE SERPL-MCNC: 2.6 MG/DL (ref 2.5–4.5)
PLATELET # BLD AUTO: 130 K/UL (ref 164–446)
PMV BLD AUTO: 10.2 FL (ref 9–12.9)
POTASSIUM SERPL-SCNC: 4.4 MMOL/L (ref 3.6–5.5)
PROCALCITONIN SERPL-MCNC: 0.9 NG/ML
PROT SERPL-MCNC: 6.2 G/DL (ref 6–8.2)
RBC # BLD AUTO: 4.77 M/UL (ref 4.7–6.1)
SODIUM SERPL-SCNC: 131 MMOL/L (ref 135–145)
TROPONIN T SERPL-MCNC: 13 NG/L (ref 6–19)
TSH SERPL DL<=0.005 MIU/L-ACNC: 1.06 UIU/ML (ref 0.38–5.33)
WBC # BLD AUTO: 2.8 K/UL (ref 4.8–10.8)

## 2024-05-11 PROCEDURE — 99291 CRITICAL CARE FIRST HOUR: CPT | Mod: GC | Performed by: STUDENT IN AN ORGANIZED HEALTH CARE EDUCATION/TRAINING PROGRAM

## 2024-05-11 RX ORDER — WARFARIN SODIUM 5 MG/1
5 TABLET ORAL ONCE
Status: COMPLETED | OUTPATIENT
Start: 2024-05-11 | End: 2024-05-11

## 2024-05-11 RX ORDER — ENOXAPARIN SODIUM 150 MG/ML
1 INJECTION SUBCUTANEOUS ONCE
Status: DISCONTINUED | OUTPATIENT
Start: 2024-05-11 | End: 2024-05-11

## 2024-05-11 RX ORDER — AMOXICILLIN 250 MG
2 CAPSULE ORAL EVERY EVENING
Status: DISCONTINUED | OUTPATIENT
Start: 2024-05-11 | End: 2024-05-13 | Stop reason: HOSPADM

## 2024-05-11 RX ORDER — OXYCODONE HYDROCHLORIDE 5 MG/1
5 TABLET ORAL EVERY 4 HOURS PRN
Status: DISCONTINUED | OUTPATIENT
Start: 2024-05-11 | End: 2024-05-13 | Stop reason: HOSPADM

## 2024-05-11 RX ORDER — MAGNESIUM SULFATE HEPTAHYDRATE 40 MG/ML
2 INJECTION, SOLUTION INTRAVENOUS ONCE
Status: COMPLETED | OUTPATIENT
Start: 2024-05-11 | End: 2024-05-11

## 2024-05-11 RX ORDER — POLYETHYLENE GLYCOL 3350 17 G/17G
1 POWDER, FOR SOLUTION ORAL
Status: DISCONTINUED | OUTPATIENT
Start: 2024-05-11 | End: 2024-05-13 | Stop reason: HOSPADM

## 2024-05-11 RX ORDER — ACETAMINOPHEN 325 MG/1
650 TABLET ORAL EVERY 6 HOURS PRN
Status: DISCONTINUED | OUTPATIENT
Start: 2024-05-11 | End: 2024-05-11

## 2024-05-11 RX ORDER — ATORVASTATIN CALCIUM 20 MG/1
20 TABLET, FILM COATED ORAL DAILY
Status: DISCONTINUED | OUTPATIENT
Start: 2024-05-11 | End: 2024-05-11 | Stop reason: SINTOL

## 2024-05-11 RX ORDER — POTASSIUM CHLORIDE 20 MEQ/1
40 TABLET, EXTENDED RELEASE ORAL ONCE
Status: COMPLETED | OUTPATIENT
Start: 2024-05-11 | End: 2024-05-11

## 2024-05-11 RX ORDER — DEXAMETHASONE SODIUM PHOSPHATE 4 MG/ML
12 INJECTION, SOLUTION INTRA-ARTICULAR; INTRALESIONAL; INTRAMUSCULAR; INTRAVENOUS; SOFT TISSUE ONCE
Status: COMPLETED | OUTPATIENT
Start: 2024-05-11 | End: 2024-05-11

## 2024-05-11 RX ORDER — SODIUM CHLORIDE, SODIUM LACTATE, POTASSIUM CHLORIDE, CALCIUM CHLORIDE 600; 310; 30; 20 MG/100ML; MG/100ML; MG/100ML; MG/100ML
INJECTION, SOLUTION INTRAVENOUS CONTINUOUS
Status: ACTIVE | OUTPATIENT
Start: 2024-05-11 | End: 2024-05-11

## 2024-05-11 RX ORDER — SODIUM CHLORIDE 9 MG/ML
1000 INJECTION, SOLUTION INTRAVENOUS ONCE
Status: COMPLETED | OUTPATIENT
Start: 2024-05-11 | End: 2024-05-11

## 2024-05-11 RX ORDER — WARFARIN SODIUM 5 MG/1
5 TABLET ORAL DAILY
Status: DISCONTINUED | OUTPATIENT
Start: 2024-05-11 | End: 2024-05-11

## 2024-05-11 RX ORDER — WARFARIN SODIUM 5 MG/1
5 TABLET ORAL DAILY
Status: DISCONTINUED | OUTPATIENT
Start: 2024-05-11 | End: 2024-05-12

## 2024-05-11 RX ADMIN — DEXAMETHASONE SODIUM PHOSPHATE 12 MG: 4 INJECTION INTRA-ARTICULAR; INTRALESIONAL; INTRAMUSCULAR; INTRAVENOUS; SOFT TISSUE at 01:02

## 2024-05-11 RX ADMIN — PIPERACILLIN SODIUM AND TAZOBACTAM SODIUM 3.38 G: 3; .375 INJECTION, POWDER, LYOPHILIZED, FOR SOLUTION INTRAVENOUS at 20:12

## 2024-05-11 RX ADMIN — INSULIN HUMAN 3 UNITS: 100 INJECTION, SOLUTION PARENTERAL at 10:23

## 2024-05-11 RX ADMIN — IOHEXOL 95 ML: 350 INJECTION, SOLUTION INTRAVENOUS at 00:45

## 2024-05-11 RX ADMIN — PIPERACILLIN SODIUM AND TAZOBACTAM SODIUM 3.38 G: 3; .375 INJECTION, POWDER, LYOPHILIZED, FOR SOLUTION INTRAVENOUS at 03:40

## 2024-05-11 RX ADMIN — WARFARIN SODIUM 5 MG: 5 TABLET ORAL at 17:16

## 2024-05-11 RX ADMIN — SODIUM CHLORIDE, POTASSIUM CHLORIDE, SODIUM LACTATE AND CALCIUM CHLORIDE: 600; 310; 30; 20 INJECTION, SOLUTION INTRAVENOUS at 02:36

## 2024-05-11 RX ADMIN — PIPERACILLIN AND TAZOBACTAM 4.5 G: 4; .5 INJECTION, POWDER, FOR SOLUTION INTRAVENOUS at 00:38

## 2024-05-11 RX ADMIN — WARFARIN SODIUM 5 MG: 5 TABLET ORAL at 03:28

## 2024-05-11 RX ADMIN — SODIUM CHLORIDE 1000 ML: 9 INJECTION, SOLUTION INTRAVENOUS at 00:11

## 2024-05-11 RX ADMIN — INSULIN HUMAN 6 UNITS: 100 INJECTION, SOLUTION PARENTERAL at 21:28

## 2024-05-11 RX ADMIN — POTASSIUM CHLORIDE 40 MEQ: 1500 TABLET, EXTENDED RELEASE ORAL at 05:29

## 2024-05-11 RX ADMIN — ATORVASTATIN CALCIUM 20 MG: 20 TABLET, FILM COATED ORAL at 05:29

## 2024-05-11 RX ADMIN — PIPERACILLIN SODIUM AND TAZOBACTAM SODIUM 3.38 G: 3; .375 INJECTION, POWDER, LYOPHILIZED, FOR SOLUTION INTRAVENOUS at 13:55

## 2024-05-11 RX ADMIN — INSULIN HUMAN 5 UNITS: 100 INJECTION, SOLUTION PARENTERAL at 13:50

## 2024-05-11 RX ADMIN — INSULIN HUMAN 5 UNITS: 100 INJECTION, SOLUTION PARENTERAL at 17:17

## 2024-05-11 RX ADMIN — MAGNESIUM SULFATE HEPTAHYDRATE 2 G: 2 INJECTION, SOLUTION INTRAVENOUS at 05:35

## 2024-05-11 ASSESSMENT — PAIN DESCRIPTION - PAIN TYPE
TYPE: ACUTE PAIN

## 2024-05-11 ASSESSMENT — ENCOUNTER SYMPTOMS
CHILLS: 1
BLURRED VISION: 0
LOSS OF CONSCIOUSNESS: 0
FALLS: 0
DOUBLE VISION: 0
DIZZINESS: 1
SPUTUM PRODUCTION: 0
COUGH: 0
WEIGHT LOSS: 0
NAUSEA: 1
SHORTNESS OF BREATH: 0
VOMITING: 1
MYALGIAS: 0
SORE THROAT: 0
ABDOMINAL PAIN: 0
FLANK PAIN: 0
WEAKNESS: 1
CONSTIPATION: 0
BACK PAIN: 1
DIARRHEA: 1
SINUS PAIN: 0
BLOOD IN STOOL: 0
FEVER: 0
PALPITATIONS: 0

## 2024-05-11 ASSESSMENT — COPD QUESTIONNAIRES
DO YOU EVER COUGH UP ANY MUCUS OR PHLEGM?: NO/ONLY WITH OCCASIONAL COLDS OR INFECTIONS
DURING THE PAST 4 WEEKS HOW MUCH DID YOU FEEL SHORT OF BREATH: NONE/LITTLE OF THE TIME
COPD SCREENING SCORE: 2
HAVE YOU SMOKED AT LEAST 100 CIGARETTES IN YOUR ENTIRE LIFE: YES

## 2024-05-11 ASSESSMENT — FIBROSIS 4 INDEX: FIB4 SCORE: 7.78

## 2024-05-11 NOTE — PROGRESS NOTES
4 Eyes Skin Assessment Completed by KENDRA Rader and KENDRA Jay.    Head WDL  Ears WDL  Nose WDL  Mouth WDL  Neck WDL  Breast/Chest WDL  Shoulder Blades WDL  Spine WDL  (R) Arm/Elbow/Hand Redness and Blanching  (L) Arm/Elbow/Hand Redness and Blanching  Abdomen WDL  Groin refused  Scrotum/Coccyx/Buttocks Redness and Blanching  (R) Leg Redness and Scab R knee  (L) Leg Scab and Bruising  L lower leg scab  (R) Heel/Foot/Toe Redness and Blanching  (L) Heel/Foot/Toe Redness and Blanching          Devices In Places Tele Box, Blood Pressure Cuff, and Pulse Ox      Interventions In Place Pillows and Low Air Loss Mattress    Possible Skin Injury No    Pictures Uploaded Into Epic Yes  Wound Consult Placed N/A  RN Wound Prevention Protocol Ordered No

## 2024-05-11 NOTE — ED NOTES
Bedside report received from previous shift.   Assumed patient care. Verified patient identification.  Checked on bed, connected to monitor,  with unlabored respirations. Discussed plan of care.   Vital signs is stable. Denied any new complaints.   Gurney in low position, side rail up for pt safety. Call light within reach.   No needs identified at the moment. Instructed to use call light when needed.    Contraptions: IV gauge 18 Rt Fa/ gauge 18 Lt FA  Alert and Oriented: x 4  Ambulatory: Yes  Oxygen:  None  Pending: Results

## 2024-05-11 NOTE — ASSESSMENT & PLAN NOTE
This is severe sepsis Present on admission  SIRS criteria identified on my evaluation include: Fever, with temperature greater than 100.9 deg F, Tachycardia, with heart rate greater than 90 BPM, and Tachypnea, with respirations greater than 20 per minute  Clinical indicators of end organ dysfunction include Hypotension with systolic blood pressure less than 90 or MAP less than 65 and Lactic Acid greater than 2  Indicators of septic shock include: Sepsis present and persistent hypotension despite fluid resuscitation   Sources is: UTI vs undetermined  Sepsis protocol initiated  Crystalloid Fluid Administration: Fluid resuscitation ordered per standard protocol - 30 mL/kg per current or ideal body weight  IV antibiotics as appropriate for source of sepsis  Reassessment: I have reassessed the patient's hemodynamic status  I ordered HIV testing that came back negative.  I ordered gonorrhea and chlamydia screening that came back negative  Continue antibiotics  Follow-up on culture results.

## 2024-05-11 NOTE — ED NOTES
Break RN:  Pt being taken upstairs at this time by IMERNESTO RN x2 via gurfuentes on cardiac monitor. Pt is awake and alert, talking to staff, in no apparent distress at time of transfer. Pt's paperwork and belongings sent upstairs with pt and RNs.

## 2024-05-11 NOTE — ASSESSMENT & PLAN NOTE
LA 3.9 to 2.6   Likely secondary to GI losses, poor PO intake and in setting of severe sepsis  IVF resuscitation

## 2024-05-11 NOTE — ED PROVIDER NOTES
ED Provider Note    Scribed for Garret Gomez by Lydia Colvin. 5/10/2024  10:52 PM    Primary care provider: SABINA Mccullough  Means of arrival: Walk-In  History obtained from: Patient  History limited by: None    CHIEF COMPLAINT  Chief Complaint   Patient presents with    Chills     Pt reports he takes Coumadin for Lupus- has been on abx for wound to leg and epididymitis.  Pt reports chills recently.      EXTERNAL RECORDS REVIEWED  Outpatient Notes Patient seen at [facility] 9 days ago for type II diabetes, Lupus, and follow up of open wound of lower leg.    HPI/ROS  LIMITATION TO HISTORY   Select: : None  OUTSIDE HISTORIAN(S):  None    HPI  Christ Calixto is a 39 y.o. male who presents to the Emergency Department for evaluation of fevers and chills onset two days ago. Patient reports that he takes Coumadin for a history of Lupus. He also reports he has been on antibiotics for a wound to his leg, and recent urinary tract infection. He reports the rheumatologist prescribed him Cipro for a five day course, but his primary care provider told him he needs to take this for two weeks. Patient denies abdominal pain. He reports recently experiencing chills, fever, and has concern of his coumadin levels. He reports feeling dehydrated and anxious. This prompted him to come into the ED tonight. Patient reports history of blood clot in 2019. He also reports one episode of vomiting half an hour ago. He denies any diarrhea, but notes frequent bowel movements. There are no known alleviating or exacerbating factors.      REVIEW OF SYSTEMS  As above, all other systems reviewed and are negative.   See HPI for further details.     PAST MEDICAL HISTORY   has a past medical history of Acute midline low back pain with left-sided sciatica (9/3/2020), Anxiety (2/18/2020), Arthritis, Blood clotting disorder (HCC) (2019), Diabetes (HCC), Heart burn, Hemorrhagic disorder (HCC), Hypertension, Obesities, morbid (HCC), JAYLIN  (obstructive sleep apnea) (1/30/2020), Seizure (HCC) (12/30/2019), Smoking (7/10/2014), and Stroke (HCC).  SURGICAL HISTORY   has a past surgical history that includes sigmoidoscopy,diagnostic (N/A, 10/12/2021) and removal anal fistula,subcutaneous (N/A, 11/9/2021).  SOCIAL HISTORY  Social History     Tobacco Use    Smoking status: Every Day     Current packs/day: 0.40     Average packs/day: 0.9 packs/day for 15.3 years (14.0 ttl pk-yrs)     Types: Cigarettes     Start date: 9/28/2006     Last attempt to quit: 1/30/2019    Smokeless tobacco: Never   Vaping Use    Vaping Use: Never used   Substance Use Topics    Alcohol use: No    Drug use: Yes     Types: Inhaled     Comment: ocassional marijuana      Social History     Substance and Sexual Activity   Drug Use Yes    Types: Inhaled    Comment: ocassional marijuana     FAMILY HISTORY  Family History   Problem Relation Age of Onset    Diabetes Mother     Diabetes Father     Heart Disease Paternal Grandfather         MI     CURRENT MEDICATIONS  Home Medications       Reviewed by Melissa Turner R.N. (Registered Nurse) on 05/10/24 at 2245  Med List Status: Not Addressed     Medication Last Dose Status   acetaminophen (TYLENOL) 325 MG Tab  Active   aspirin (ASA) 81 MG Chew Tab chewable tablet  Active   atorvastatin (LIPITOR) 20 MG Tab  Active   ciprofloxacin (CIPRO) 500 MG Tab  Active   dilTIAZem HCl (DILTIAZEM 2 %)  Active   glyBURIDE-metFORMIN (GLUCOVANCE) 5-500 MG Tab  Active   HYDROcodone-acetaminophen (NORCO) 5-325 MG Tab per tablet  Active   Lidocaine 5 % Cream  Active   lisinopril-hydrochlorothiazide (PRINZIDE) 20-25 MG per tablet  Active   polyethylene glycol/lytes (MIRALAX) 17 g Pack  Active   VITAMIN D PO  Active   warfarin (COUMADIN) 5 MG Tab  Active                  ALLERGIES  No Known Allergies    PHYSICAL EXAM    VITAL SIGNS:   Vitals:    05/11/24 0030 05/11/24 0100 05/11/24 0211 05/11/24 0224   BP: 96/55 98/55 96/50    Pulse: (!) 111 (!) 110 (!) 115 (!)  "121   Resp: 15 18 20 20   Temp:   37.2 °C (98.9 °F)    TempSrc:   Temporal    SpO2: 95% 96% 93% 94%   Weight:   (!) 173 kg (380 lb 15.3 oz)    Height:   1.778 m (5' 10\")        Vitals: My interpretation: hypotensive, tachycardic, afebrile, not hypoxic    Reinterpretation of vitals: Improving.     Cardiac Monitor Interpretation: The cardiac monitor revealed normal Sinus Rhythm  as interpreted by me. The cardiac monitor was ordered secondary to the patient's history of hypotension and tachycardic and to monitor for dysrhythmia and/or tachycardia.    PE:   Gen: sitting comfortably, speaking clearly, appears in no acute distress   ENT: Mucous membranes moist, posterior pharynx clear, uvula midline, nares patent bilaterally   Neck: Supple, FROM  Pulmonary: Lungs are clear to auscultation bilaterally. No tachypnea  CV:  RRR, no murmur appreciated, pulses 2+ in both upper and lower extremities  Abdomen: soft, NT/ND; no rebound/guarding  : no CVA or suprapubic tenderness   Extremities: Patient's left calf has a very well-healing scabbed over wound, no surrounding induration or fluctuance  Neuro: A&Ox4 (person, place, time, situation), speech fluent, gait steady, no focal deficits appreciated  Skin: No rash or lesions.  No pallor or jaundice.  No cyanosis.  Warm and dry.   Psych: Patient appears visibly anxious, diaphoretic, no SI or HI    DIAGNOSTIC STUDIES / PROCEDURES    LABS  Results for orders placed or performed during the hospital encounter of 05/10/24   Lactic Acid   Result Value Ref Range    Lactic Acid 3.9 (H) 0.5 - 2.0 mmol/L   Lactic Acid   Result Value Ref Range    Lactic Acid 2.6 (H) 0.5 - 2.0 mmol/L   CBC with Differential   Result Value Ref Range    WBC 6.4 4.8 - 10.8 K/uL    RBC 5.75 4.70 - 6.10 M/uL    Hemoglobin 16.8 14.0 - 18.0 g/dL    Hematocrit 46.8 42.0 - 52.0 %    MCV 81.4 81.4 - 97.8 fL    MCH 29.2 27.0 - 33.0 pg    MCHC 35.9 32.3 - 36.5 g/dL    RDW 40.4 35.9 - 50.0 fL    Platelet Count 162 (L) 164 " - 446 K/uL    MPV 9.8 9.0 - 12.9 fL    Neutrophils-Polys 87.60 (H) 44.00 - 72.00 %    Lymphocytes 7.00 (L) 22.00 - 41.00 %    Monocytes 3.80 0.00 - 13.40 %    Eosinophils 0.20 0.00 - 6.90 %    Basophils 0.80 0.00 - 1.80 %    Immature Granulocytes 0.60 0.00 - 0.90 %    Nucleated RBC 0.00 0.00 - 0.20 /100 WBC    Neutrophils (Absolute) 5.61 1.82 - 7.42 K/uL    Lymphs (Absolute) 0.45 (L) 1.00 - 4.80 K/uL    Monos (Absolute) 0.24 0.00 - 0.85 K/uL    Eos (Absolute) 0.01 0.00 - 0.51 K/uL    Baso (Absolute) 0.05 0.00 - 0.12 K/uL    Immature Granulocytes (abs) 0.04 0.00 - 0.11 K/uL    NRBC (Absolute) 0.00 K/uL   Complete Metabolic Panel   Result Value Ref Range    Sodium 132 (L) 135 - 145 mmol/L    Potassium 3.7 3.6 - 5.5 mmol/L    Chloride 95 (L) 96 - 112 mmol/L    Co2 21 20 - 33 mmol/L    Anion Gap 16.0 7.0 - 16.0    Glucose 142 (H) 65 - 99 mg/dL    Bun 21 8 - 22 mg/dL    Creatinine 1.67 (H) 0.50 - 1.40 mg/dL    Calcium 8.6 8.5 - 10.5 mg/dL    Correct Calcium 9.1 8.5 - 10.5 mg/dL    AST(SGOT) 1370 (HH) 12 - 45 U/L    ALT(SGPT) 1987 (HH) 2 - 50 U/L    Alkaline Phosphatase 121 (H) 30 - 99 U/L    Total Bilirubin 5.4 (H) 0.1 - 1.5 mg/dL    Albumin 3.4 3.2 - 4.9 g/dL    Total Protein 7.6 6.0 - 8.2 g/dL    Globulin 4.2 (H) 1.9 - 3.5 g/dL    A-G Ratio 0.8 g/dL   Urinalysis    Specimen: Urine   Result Value Ref Range    Color Orange (A)     Character Cloudy (A)     Specific Gravity 1.026 <1.035    Ph 5.0 5.0 - 8.0    Glucose Negative Negative mg/dL    Ketones Trace (A) Negative mg/dL    Protein 30 (A) Negative mg/dL    Bilirubin Large (A) Negative    Urobilinogen, Urine 2.0 Negative    Nitrite Positive (A) Negative    Leukocyte Esterase Small (A) Negative    Occult Blood Negative Negative    Micro Urine Req Microscopic    PROTHROMBIN TIME (INR)   Result Value Ref Range    PT 18.4 (H) 12.0 - 14.6 sec    INR 1.51 (H) 0.87 - 1.13   URINE MICROSCOPIC (W/UA)   Result Value Ref Range    WBC 0-2 (A) /hpf    RBC 0-2 (A) /hpf    Bacteria  Negative None /hpf    Epithelial Cells Moderate (A) /hpf    Hyaline Cast 0-2 /lpf   ESTIMATED GFR   Result Value Ref Range    GFR (CKD-EPI) 52 (A) >60 mL/min/1.73 m 2   HEPATITIS PANEL ACUTE(4 COMPONENTS)   Result Value Ref Range    Hepatitis B Surface Antigen Non-Reactive Non-Reactive    Hepatitis B Cors Ab,IgM Non-Reactive Non-Reactive    Hepatitis A Virus Ab, IgM Non-Reactive Non-Reactive    Hepatitis C Antibody Non-Reactive Non-Reactive   LIPASE   Result Value Ref Range    Lipase 53 11 - 82 U/L   ACETAMINOPHEN   Result Value Ref Range    Acetaminophen -Tylenol <5.0 (L) 10.0 - 30.0 ug/mL   COPPER, URINE 24 HR OR RANDOM   Result Value Ref Range    Total Volume Random mL    Collection Length Random Hrs   TROPONIN   Result Value Ref Range    Troponin T 13 6 - 19 ng/L   EKG   Result Value Ref Range    Report       [facility] Emergency Dept.    Test Date:  2024-05-10  Pt Name:    GIL ELIAS                Department: ER  MRN:        [id number]                      Room:  Gender:     Male                         Technician: [id number]  :        1985                   Requested By:ER TRIAGE PROTOCOL  Order #:    [id number]                    Reading MD: Garret Gomez    Measurements  Intervals                                Axis  Rate:       139                          P:          6  VA:         122                          QRS:        55  QRSD:       89                           T:          12  QT:         310  QTc:        472    Interpretive Statements  Sinus tachycardia with irregular rate  Compared to ECG 2021 10:34:02  Sinus rhythm no longer present  Electronically Signed On 05- 22:53:00 PDT by Garret Gomez        All labs reviewed by me. Labs were compared to prior labs if they were available. Significant for lactic acid elevated 3.9, no leukocytosis, no anemia, electrolytes show no abnormality, normal glucose, ABHIJIT with creatinine 1.67, patient is alarmingly elevated  transaminitis with ALT predominance, 1987, and 1370, hyperbilirubinemia of 5.4, PT and INR minimally elevated and subtherapeutic for anticoagulation, urinalysis contaminated but without infection, no bacteria, lipase normal.  Hepatitis panel negative.  Repeat lactic improved to 2.5    RADIOLOGY  I have independently interpreted the diagnostic imaging associated with this visit and am waiting the final reading from the radiologist.   My preliminary interpretation is a follows: On my independent interpretation patient has no new significant cardiomegaly or focal consolidative process.     Radiologist interpretation is as follows:  CT-ABDOMEN-PELVIS WITH   Final Result      1.  No bowel obstruction or perforation.   2.  Normal appendix.   3.  Nonobstructing RIGHT kidney stone.   4.  Vague areas of decreased enhancement in the lower pole RIGHT kidney posteriorly and upper pole LEFT kidney anteriorly possibly indicating focal nephritis.  Mass is difficult to entirely exclude.  Follow-up recommended.      DX-CHEST-PORTABLE (1 VIEW)   Final Result      No acute cardiopulmonary disease.      US-RUQ    (Results Pending)     COURSE & MEDICAL DECISION MAKING  Nursing notes, VS, PMSFHx, labs, imaging, EKG reviewed in chart.    ED Observation Status? No; Patient does not meet criteria for ED Observation.     Ddx: Adrenal sufficiency, sepsis, urosepsis, UTI, pyelonephritis, PE, MI, pneumonia, septicemia, bacteremia    MDM: 10:52 PM Christ Calixto is a 39 y.o. male who presented with evaluation for a few days of subjective fevers and chills.  Patient has a medical history of antiphospholipid antibody syndrome, on Coumadin, history of lupus.  Recent urinary tract infection, currently on antibiotics with Cipro he thinks.  Upon arrival here, I was called emergently to the room as patient is hypotensive and tachycardic, also tachypneic.  He appears visibly anxious and does state that he feels anxious and is having a panic attack  but I do think there is serious underlying etiology considering his shock index is extremely positive.  He was immediately started on 2 L bolus of IV fluids and ordered sepsis workup.  Chest x-ray my independent  interpretation shows no focal consolidation or cardiomegaly, radiologist agrees.  Patient had a EKG done which shows sinus tachycardia, without ischemic changes. All labs reviewed by me. Labs were compared to prior labs if they were available. Significant for lactic acid elevated 3.9, no leukocytosis, no anemia, electrolytes show no abnormality, normal glucose, ABHIJIT with creatinine 1.67, patient is alarmingly elevated transaminitis with ALT predominance, 1987, and 1370, hyperbilirubinemia of 5.4, PT and INR minimally elevated and subtherapeutic for anticoagulation, urinalysis contaminated but without infection, no bacteria, lipase normal.  Hepatitis panel negative.  I did repeat abdominal exam the patient is still not tender, but will empirically start him on Zosyn and get a hepatitis panel.  CT imaging is negative for any abnormalities in the right upper quadrant or liver, no signs cholecystitis.  I do think patient could be having acute autoimmune hepatitis secondary to lupus.  Will start him on 12 mg Decadron IV. Ordered copper studies to evaluate for Mac's disease.    Ultimately patient will need admission likely to the stepdown considering his hypotension tachycardia, although he is stable, not requiring pressors currently.  Spoke with the intensivist who recommends stepdown place for the patient and will admit to the hospitalist.  Patient critically ill but stable at time of admission. Likely will require liver biopsy during admission for further characterization of his acute fulminant liver disease.   As his Coumadin level is subtherapeutic, he was given a 1 time dose of 1 mg/kg of Lovenox to continue his anticoagulation secondary to his antiphospholipid antibody syndrome.     HYDRATION: Based on  the patient's presentation of Acute Vomiting, Dehydration and Inability to take oral fluids the patient was given IV fluids. IV Hydration was used because oral hydration was not adequate alone. Upon recheck following hydration, the patient was imroved.    CRITICAL CARE TIME 81 minutes: Acute hypotensive shock secondary to sepsis with acute fulminant liver failure, frequent reevaluations, dehydration requiring multiple fluid boluses  There was a very real possibility of deterioration of the patient's condition.  This patient required the highest level of care.  I provided critical care services which included: review of the medical record, treatment orders, ordering and reviewing test results, frequent reevaluation of the patient's condition and response to treatment, as well as discussing the case with appropriate personnel and various consultants. The critical care time associated with the care of this patient is exclusive of any procedures or specific interventions.     ADDITIONAL PROBLEM LIST AND DISPOSITION    I have discussed management of the patient with the following physicians and NATHEN's:  Hospitalist, intensivist    Discussion of management with other Q or appropriate source(s): None     Barriers to care at this time, including but not limited to:  None .     Decision tools and prescription drugs considered including, but not limited to:  None .    FINAL IMPRESSION  1. Sepsis with acute renal failure and septic shock, due to unspecified organism, unspecified acute renal failure type (HCC) Acute   2. Hypotension, unspecified hypotension type Acute   3. Sinus tachycardia Acute   4. Dehydration Acute   5. Lactic acidosis Acute   6. ABHIJIT (acute kidney injury) (HCC) Acute   7. Fulminant liver failure (HCC) Acute   8. Hyperbilirubinemia Acute   9. Transaminitis Acute   10. Autoimmune hepatitis (HCC) Acute   11. Morbid obesity (HCC)    12. JAYLIN (obstructive sleep apnea)       Lydia CHILELScrugo), brody marin  for, and in the presence of, Garret Gomez.    Electronically signed by: Lydia Colvin (Scribe), 5/10/2024    I, Garret Gomez personally performed the services described in this documentation, as scribed by Lydia Colvin in my presence, and it is both accurate and complete.    The note accurately reflects work and decisions made by me.  Garret Gomez  5/10/2024  11:17 PM

## 2024-05-11 NOTE — ASSESSMENT & PLAN NOTE
BUN 21, Cr 1.67 (baseline 1.0)  Likely secondary to hypovolemia from GI losses and poor PO intake  Renal function has been improving.  Similar lab workup for tomorrow.

## 2024-05-11 NOTE — ED NOTES
Medication history reviewed with patient at bedside.   Med rec is complete,  Allergies reviewed.     Patient was prescribed  a 5 days course of cipro 500mg bid on 4/30/24- patient states last dose was 5/10/24- 2000.    Anticoagulants: Warfarin 5mg- last dose 5/9/24 HS.    Donovan Scott PhT

## 2024-05-11 NOTE — H&P
"History & Physical Note    Date of Admission: 5/11/2024  Admission Status: Emergency  [facility] Team: [facility]  Attending: Shiva Ortega M.D.  Senior Resident: Dr. Silvia Gaines  Contact Number: [phone]    Chief Complaint: \"feeling crappy\"     History of Present Illness (HPI):  Christ is a 39 y.o. male with PMHx antiphospholipid syndrome on coumadin c/b CVA, T2DM, HTN, HLD, chronic LLE wound, morbid obesity, JAYLIN, tobacco use, who presented 5/10/2024 after worsening \"feeling crappy\" onset 3 days ago. He states that he had chills, N/V and diarrhea for the last 3 days. He thought that his \"epididymitis\" had come back, and he was prescribed an additional 14 day course of ciprofloxacin yesterday, and was only able to take one dose before getting sick with N/V and dizziness/lightheadedness. He states that he started peeing \"orange\" 3 days ago. He reports dry mouth and feeling thirsty - \"I want to drink a gallon of water.\" No abdominal pain, F/C, SOB, CP, palpitations. No sick contacts.     Previous antibiotic was 10 day course of unknown antibiotic for LLE wound and then 5 day course of cipro for epididymitis at end of 03/2024.    Review of Systems:  Review of Systems   Constitutional:  Positive for chills. Negative for fever and weight loss.   HENT:  Negative for sinus pain and sore throat.    Eyes:  Negative for blurred vision and double vision.   Respiratory:  Negative for cough, sputum production and shortness of breath.    Cardiovascular:  Negative for chest pain, palpitations and leg swelling.   Gastrointestinal:  Positive for diarrhea, nausea and vomiting. Negative for abdominal pain, blood in stool, constipation and melena.   Genitourinary:  Negative for dysuria, flank pain, frequency, hematuria and urgency.   Musculoskeletal:  Positive for back pain (upper). Negative for falls and myalgias.   Skin:         Chronic left leg wound   Neurological:  Positive for dizziness and weakness. Negative for loss of consciousness. "       Past Medical History:   Past Medical History was reviewed with patient.   has a past medical history of Acute midline low back pain with left-sided sciatica (9/3/2020), Anxiety (2/18/2020), Arthritis, Blood clotting disorder ([facility]) (2019), Diabetes ([facility]), Heart burn, Hemorrhagic disorder ([facility]), Hypertension, Obesities, morbid ([facility]), JAYLIN (obstructive sleep apnea) (1/30/2020), Seizure ([facility]) (12/30/2019), Smoking (7/10/2014), and Stroke ([facility]).    Past Surgical History: Past Surgical History was reviewed with patient.   has a past surgical history that includes pr sigmoidoscopy,diagnostic (N/A, 10/12/2021) and pr removal anal fistula,subcutaneous (N/A, 11/9/2021).    Medications: Medications have been reviewed with patient.  Prior to Admission Medications   Prescriptions Last Dose Informant Patient Reported? Taking?   Cholecalciferol (VITAMIN D) 2000 UNIT Tab 5/10/2024 at am Patient Yes Yes   Sig: Take 2,000 Units by mouth every day.   acetaminophen (TYLENOL) 500 MG Tab 5/10/2024 at am Patient Yes Yes   Sig: Take 1,000 mg by mouth 1 time a day as needed for Mild Pain.   atorvastatin (LIPITOR) 20 MG Tab 5/10/2024 at am Patient No Yes   Sig: Take 1 tablet by mouth nightly   Patient taking differently: Take 20 mg by mouth every day.   ciprofloxacin (CIPRO) 500 MG Tab 5/10/2024 at 2000 Patient Yes Yes   Sig: Take 500 mg by mouth 2 times a day.   glyBURIDE-metFORMIN (GLUCOVANCE) 5-500 MG Tab 5/10/2024 at am Patient No Yes   Sig: TAKE 1 TABLET BY MOUTH IN THE MORNING WITH BREAKFAST   Patient taking differently: Take 1 Tablet by mouth every morning with breakfast. TAKE 1 TABLET BY MOUTH IN THE MORNING WITH BREAKFAST   lisinopril-hydrochlorothiazide (PRINZIDE) 20-25 MG per tablet 5/10/2024 at am Patient No Yes   Sig: Take 1 tablet by mouth every day.   warfarin (COUMADIN) 5 MG Tab 5/9/2024 at hs Patient Yes Yes   Sig: Take 1 Tablet by mouth every day.      Facility-Administered Medications: None     Allergies: Allergies  have been reviewed with patient.  No Known Allergies    Family History:  family history includes Diabetes in his father and mother; Heart Disease in his paternal grandfather.     Social History:   Tobacco:  smokes 8 cigs/day for at least 10 years   Alcohol:  denies  Recreational drugs (illegal and prescription):  smokes marijuana very rarely   Activity Level:  active and independent   Living situation:  lives with roommates  Recent travel:  none  Primary Care Provider: reviewed SABINA Mccullough  Other (stressors, spirituality, exposures):  none    Vitals:  Temp:  [36.3 °C (97.4 °F)-37.2 °C (98.9 °F)] 37.2 °C (98.9 °F)  Pulse:  [] 97  Resp:  [15-31] 31  BP: ()/(44-62) 114/62  SpO2:  [92 %-99 %] 94 %    Physical Exam  Constitutional:       General: He is not in acute distress.     Appearance: He is obese. He is not ill-appearing, toxic-appearing or diaphoretic.   HENT:      Head: Normocephalic and atraumatic.      Nose: Nose normal. No rhinorrhea.      Mouth/Throat:      Mouth: Mucous membranes are dry.      Comments: White plaques along posterior tongue  Eyes:      General: No scleral icterus.     Extraocular Movements: Extraocular movements intact.      Conjunctiva/sclera: Conjunctivae normal.   Cardiovascular:      Rate and Rhythm: Regular rhythm. Tachycardia present.      Heart sounds: No murmur heard.  Pulmonary:      Effort: Pulmonary effort is normal. No respiratory distress.      Breath sounds: No wheezing or rales.   Abdominal:      General: There is no distension.      Palpations: Abdomen is soft.      Tenderness: There is no abdominal tenderness. There is no guarding or rebound.   Musculoskeletal:         General: Normal range of motion.      Cervical back: Normal range of motion and neck supple.      Right lower leg: No edema.      Left lower leg: No edema.      Comments: Chronic wound without evidence of infection to LLE with ecchymosis along the lateral and posterior ankle   Skin:      "General: Skin is warm and dry.   Neurological:      General: No focal deficit present.      Mental Status: He is alert and oriented to person, place, and time. Mental status is at baseline.   Psychiatric:         Mood and Affect: Mood normal.         Behavior: Behavior normal.       Labs:   Lab Results   Component Value Date/Time    WBC 6.4 05/10/2024 10:50 PM    RBC 5.75 05/10/2024 10:50 PM    HEMOGLOBIN 16.8 05/10/2024 10:50 PM    HEMATOCRIT 46.8 05/10/2024 10:50 PM    MCV 81.4 05/10/2024 10:50 PM    MCH 29.2 05/10/2024 10:50 PM    MCHC 35.9 05/10/2024 10:50 PM    MPV 9.8 05/10/2024 10:50 PM    NEUTSPOLYS 87.60 (H) 05/10/2024 10:50 PM    LYMPHOCYTES 7.00 (L) 05/10/2024 10:50 PM    MONOCYTES 3.80 05/10/2024 10:50 PM    EOSINOPHILS 0.20 05/10/2024 10:50 PM    BASOPHILS 0.80 05/10/2024 10:50 PM      Lab Results   Component Value Date/Time    SODIUM 132 (L) 05/10/2024 10:50 PM    POTASSIUM 3.7 05/10/2024 10:50 PM    CHLORIDE 95 (L) 05/10/2024 10:50 PM    CO2 21 05/10/2024 10:50 PM    GLUCOSE 142 (H) 05/10/2024 10:50 PM    BUN 21 05/10/2024 10:50 PM    CREATININE 1.67 (H) 05/10/2024 10:50 PM      No results found for: \"NEVFD86B\", \"HVMFND491B\", \"NJDSY164O\", \"ARTHCO3\", \"ARTBE\", \"GAPBG\", \"LGY8ITK9\"    Lab Results   Component Value Date/Time    PROTHROMBTM 18.4 (H) 05/10/2024 10:50 PM    INR 1.51 (H) 05/10/2024 10:50 PM      EKG: Per my read, sinus tachycardia 139 without ischemic changes     Imaging:   CT-ABDOMEN-PELVIS WITH   Final Result      1.  No bowel obstruction or perforation.   2.  Normal appendix.   3.  Nonobstructing RIGHT kidney stone.   4.  Vague areas of decreased enhancement in the lower pole RIGHT kidney posteriorly and upper pole LEFT kidney anteriorly possibly indicating focal nephritis.  Mass is difficult to entirely exclude.  Follow-up recommended.      DX-CHEST-PORTABLE (1 VIEW)   Final Result      No acute cardiopulmonary disease.      US-RUQ    (Results Pending)     Previous Data Review: " reviewed    Assessment and Plan:   39 y.o. male with PMHx antiphospholipid syndrome on coumadin c/b CVA, T2DM, HTN, HLD, chronic LLE wound, morbid obesity, JAYLIN, tobacco use, who was admitted 5/10/2024 for severe sepsis with shock liver and acute kidney injury thought to be secondary to urinary vs other.    * Sepsis with acute renal failure and septic shock, due to unspecified organism, unspecified acute renal failure type (HCC)- (present on admission)  Assessment & Plan  This is severe sepsis Present on admission  SIRS criteria identified on my evaluation include: Fever, with temperature greater than 100.9 deg F, Tachycardia, with heart rate greater than 90 BPM, and Tachypnea, with respirations greater than 20 per minute  Clinical indicators of end organ dysfunction include Hypotension with systolic blood pressure less than 90 or MAP less than 65 and Lactic Acid greater than 2  Indicators of septic shock include: Sepsis present and persistent hypotension despite fluid resuscitation   Sources is: UTI vs undetermined  Sepsis protocol initiated  Crystalloid Fluid Administration: Fluid resuscitation ordered per standard protocol - 30 mL/kg per current or ideal body weight  IV antibiotics as appropriate for source of sepsis  Reassessment: I have reassessed the patient's hemodynamic status  F/u BC    ABHIJIT (acute kidney injury) (HCC)  Assessment & Plan  BUN 21, Cr 1.67 (baseline 1.0)  Likely secondary to hypovolemia from GI losses and poor PO intake  IVF resuscitation    Shock liver  Assessment & Plan  AST 1370, ALT 1987, , T bili 5.4  Could be secondary to severe sepsis, autoimmune hepatitis vs less likely viral, drugs/toxin, metabolic  Patient denies any abdominal exam. H/o NAFLD.   Acetaminophen level negative, hepatitis non reactive  IVF resuscitation  F/u Mac's, ASMA, AMA  F/u RUQ U/S    Lactic acidosis  Assessment & Plan  LA 3.9 to 2.6   Likely secondary to GI losses, poor PO intake and in setting of severe  sepsis  IVF resuscitation    Hypomagnesemia  Assessment & Plan  1.3, repleted  F/u with repeat Mg level    Antiphospholipid syndrome (HCC)- (present on admission)  Assessment & Plan  C/b CVA 2019  Continue warfarin    Cerebrovascular accident (CVA) due to vascular occlusion (HCC)- (present on admission)  Assessment & Plan  H/o, 2019. 2/2 antiphospholipid syndrome.    JAYLIN (obstructive sleep apnea)- (present on admission)  Assessment & Plan  CPAP prn and at night  RT protocol  Pulm referral for sleep apnea testing ordered for outpatient    Type 2 diabetes mellitus without complication, without long-term current use of insulin (HCC)- (present on admission)  Assessment & Plan  SSI, hypoglycemia protocol    Morbid obesity (HCC)- (present on admission)  Assessment & Plan  Nutrition consulted  Outpatient follow up    Hypertension- (present on admission)  Assessment & Plan  Home antihypertensives held in setting of severe sepsis      Code Status: FULL  DVT prophylaxis: warfarin  Diet: diabetic  GI: none  Disposition: to remain inpatient    Silvia Gaines DO  PGY-2 Internal Medicine

## 2024-05-11 NOTE — ED NOTES
Assist RN:  Pt states he finished round of antibiotics on 5/5 for UTI. Pt states his symptoms returned at 0600 on Wednesday. Pt also states that he takes Coumadin for Lupus.

## 2024-05-11 NOTE — ED NOTES
Break RN:  Pt sleeping in bed in view of RN station with even and unlabored breaths. No acute distress noted. Will continue to monitor.

## 2024-05-11 NOTE — ED NOTES
Lab called for critical lab values.    WCY=6202  ALT=1987  Bilirubin = 5.4      ERP aware. Primary RN aware.

## 2024-05-11 NOTE — WOUND TEAM
[facility]  Inpatient Services  Initial Wound and Skin Care Evaluation    Admission Date: 5/10/2024     Last order of IP CONSULT TO WOUND CARE was found on 5/11/2024 from Hospital Encounter on 5/10/2024     HPI, PMH, SH: Reviewed    Past Surgical History:   Procedure Laterality Date    TN REMOVAL ANAL FISTULA,SUBCUTANEOUS N/A 11/9/2021    Procedure: FISTULOTOMY, ANAL;  Surgeon: Garret Mcdonald M.D.;  Location: SURGERY [facility];  Service: General    TN SIGMOIDOSCOPY,DIAGNOSTIC N/A 10/12/2021    Procedure: SIGMOIDOSCOPY - FLEXIBLE WITH DILATION;  Surgeon: Brehane Saravia M.D.;  Location: SURGERY SAME DAY [facility];  Service: Gastroenterology     Social History     Tobacco Use    Smoking status: Every Day     Current packs/day: 0.40     Average packs/day: 0.9 packs/day for 15.3 years (14.0 ttl pk-yrs)     Types: Cigarettes     Start date: 9/28/2006     Last attempt to quit: 1/30/2019    Smokeless tobacco: Never   Substance Use Topics    Alcohol use: No     Chief Complaint   Patient presents with    Chills     Pt reports he takes Coumadin for Lupus- has been on abx for wound to leg and epididymitis.  Pt reports chills recently.      Diagnosis: Sepsis with acute renal failure and septic shock, due to unspecified organism, unspecified acute renal failure type (HCC) [A41.9, R65.21, N17.9]    Unit where seen by Wound Team: [id number]     WOUND CONSULT RELATED TO:  LLE    WOUND TEAM PLAN OF CARE - Frequency of Follow-up:   Nursing to follow dressing orders written for wound care. Contact wound team if area fails to progress, deteriorates or with any questions/concerns if something comes up before next scheduled follow up (See below as to whether wound is following and frequency of wound follow up)   Not following, consult as needed  - LLE    WOUND HISTORY:   Patient states wound has been present since February. Patient has been going to [facility] weekly and keeping wound dry.       WOUND ASSESSMENT/LDA  Wound  05/06/24 Leg Lateral Left (Active)   Date First Assessed/Time First Assessed: 05/06/24 1300   Hand Hygiene Completed: Yes  Location: Leg  Wound Orientation: Lateral  Laterality: Left      Assessments 5/11/2024  1:00 PM   Wound Image     Site Assessment Dry;Eschar;Painful   Periwound Assessment Pink;Red   Margins Attached edges   Closure Open to air   Drainage Amount None   Treatments Cleansed;Site care   Wound Cleansing Normal Saline Irrigation   Dressing Status Open to Air   Dressing Cleansing/Solutions 3% Betadine   Dressing Change/Treatment Frequency Every Shift, and As Needed   NEXT Dressing Change/Treatment Date 05/11/24   NEXT Weekly Photo (Inpatient Only) 05/18/24   Wound Team Following Not following        Vascular:    VICKI:   No results found.    Lab Values:    Lab Results   Component Value Date/Time    WBC 2.8 (L) 05/11/2024 08:45 AM    RBC 4.77 05/11/2024 08:45 AM    HEMOGLOBIN 13.8 (L) 05/11/2024 08:45 AM    HEMATOCRIT 39.6 (L) 05/11/2024 08:45 AM    CREACTPROT 2.46 (H) 08/16/2021 09:05 AM    SEDRATEWES 90 (H) 07/21/2020 12:43 PM    HBA1C 8.4 (H) 09/28/2021 10:21 AM         Culture Results show:  No results found for this or any previous visit (from the past 720 hour(s)).    Pain Level/Medicated:  None, Tolerated without pain medication       INTERVENTIONS BY WOUND TEAM:  Chart and images reviewed. Discussed with bedside RN. All areas of concern (based on picture review, LDA review and discussion with bedside RN) have been thoroughly assessed. Documentation of areas based on significant findings. This RN in to assess patient. Performed standard wound care which includes appropriate positioning, dressing removal and non-selective debridement. Pictures and measurements obtained weekly if/when required.    Wound:  LLE  Cleansed/Non-selectively Debrided with:  Normal Saline and Gauze  Kendra wound: Cleansed with Normal Saline and Gauze, Prepped with N/A  Primary Dressing:  betadine applied, left SHALOM    Advanced  Wound Care Discharge Planning  Number of Clinicians necessary to complete wound care: 1  Is patient requiring IV pain medications for dressing changes:  No   Length of time for dressing change 5 min. (This does not include chart review, pre-medication time, set up, clean up or time spent charting.)    Interdisciplinary consultation: Patient    EVALUATION / RATIONALE FOR TREATMENT:     Date:  05/11/24  Wound Status:  Initial evaluation    Painful LLE wound covered with dry, stable eschar. Betadine applied to keep clean and dry. New referral placed for [facility].     Patient refused assessment of heels and sacrum.         Goals: Steady decrease in wound area and depth weekly.    NURSING PLAN OF CARE ORDERS:  Dressing changes: See Dressing Care orders    NUTRITION RECOMMENDATIONS   Wound Team Recommendations:  N/A    DIET ORDERS (From admission to next 24h)       Start     Ordered    05/11/24 0154  Diet Order Diet: Consistent CHO (Diabetic)  ALL MEALS        Question:  Diet:  Answer:  Consistent CHO (Diabetic)    05/11/24 0201                    PREVENTATIVE INTERVENTIONS:    Q shift Peter - performed per nursing policy  Q shift pressure point assessments - performed per nursing policy    Surface/Positioning  ICU Low [place] - Currently in Place    Anticipated discharge plans:  Self/Family Care and Outpatient Wound Center        Vac Discharge Needs:  Vac Discharge plan is purely a recommendation from wound team and not a requirement for discharge unless otherwise stated by physician.  Not Applicable Pt not on a wound vac

## 2024-05-11 NOTE — PROGRESS NOTES
I have been contacted regarding appropriate disposition of Christ Calixto. I have reviewed the patient's case including chart review, conversation with the provider and evaluation of labs, imaging, and assessment/plan. This patient is deemed safe to be admitted to the IMCU for undifferentiated hypotension with ABHIJIT and transaminitis and remains under the care of hospitalist (who all questions and concerns should be relayed to).  While a critical care consultation has not been requested, we are immediately available if the patient requires critical care in the future.

## 2024-05-11 NOTE — PROGRESS NOTES
Inpatient Anticoagulation Service Note for 5/11/2024      Reason for Anticoagulation: Stroke, Coagulation disorder (Antiphospholipid syndrome)      Hemoglobin Value: 16.8  Hematocrit Value: 46.8  Lab Platelet Value: (!) 162  Target INR: 2.0 to 3.0    INR from last 7 days       Date/Time INR Value    05/10/24 2250 1.51          Dose from last 7 days       Date/Time Dose (mg)    05/11/24 0433 5          Average Dose (mg): 35  Significant Interactions: Not Applicable  Bridge Therapy: No     Reversal Agent Administered: Not Applicable    Comments: 38 yo patient presenting to ED with chills, N/V for 3 days. He has PMH of antiphospholipid syndrome on warfarin, CVA (2020), T2DM, HTN, HLD, chronic LLE wound, obesity. H&H, platelet unremarkable. Recent INR of 1.5 is subtherapeutic (goal 2-3). INR per outpatient clinic on 5/1/2024 was therapeutic at 2.1. Plan to give 5 mg x 1 now & continue warfarin 5 mg PO daily, and monitor INR levels. INR x 3 levels ordered.     Plan:  warfarin 5 mg PO daily  Education Material Provided?: No    Pharmacist suggested discharge dosing: warfarin 5 mg PO daily. Follow up with INR levels in 48-72 hours     Rika Phillips, PharmD

## 2024-05-11 NOTE — PROGRESS NOTES
Pharmacy Warfarin Monitoring     Date: 5/11/2024  Reason for Anticoagulation: Stroke, Antiphospholipid syndrome  Target INR: 2.0 to 3.0     Hemoglobin Value: (!) 13.8  Hematocrit Value: (!) 39.6  Lab Platelet Value: (!) 130    INR from last 7 days       Date/Time INR Value    05/10/24 2250 1.51          Dose from last 7 days       Date/Time Dose (mg)    05/11/24 1601 5    05/11/24 0433 5          Home Dose (mg): 5 mg daily  Significant Interactions: Not Applicable  Bridge Therapy: Not at this time, will monitor    Reversal Agent Administered: Not Applicable  Comments: INR subtherapeutic.  Dose from 5/10 given at 4AM this morning as patient was in ER last night.  Continue home dosing of 5 mg daily.      Education Material Provided?: No - chronic warfarin patient  Pharmacist suggested discharge dosing: TBD pending INR trends     Thank you!  Kary Perkins, PharmD, BCCCP

## 2024-05-11 NOTE — PROGRESS NOTES
"Hospital Medicine Daily Progress Note    Date of Service  5/11/2024    Chief Complaint  Christ Calixto is a 39 y.o. male admitted 5/10/2024 with \"feeling crappy\"    Hospital Course    Christ is a 39 y.o. male with PMHx antiphospholipid syndrome on coumadin c/b CVA, T2DM, HTN, HLD, chronic LLE wound, morbid obesity, JAYLIN, tobacco use, who presented 5/10/2024 after worsening \"feeling crappy\" onset 3 days ago. He states that he had chills, N/V and diarrhea for the last 3 days. He thought that his \"epididymitis\" had come back, and he was prescribed an additional 14 day course of ciprofloxacin yesterday, and was only able to take one dose before getting sick with N/V and dizziness/lightheadedness. He states that he started peeing \"orange\" 3 days ago. He reports dry mouth and feeling thirsty - \"I want to drink a gallon of water.\" No abdominal pain, F/C, SOB, CP, palpitations. No sick contacts.      Previous antibiotic was 10 day course of unknown antibiotic for LLE wound and then 5 day course of cipro for epididymitis at end of 03/2024.    Interval Problem Update    05/11/24    I evaluated and examined him at the bedside.  Patient admitted by my colleague this morning.  He reported that he is feeling better.  He denies scrotal pain and dysuria.  I am continuing IV antibiotics.  I ordered repeat lab workup that showed improvement in liver enzymes.  CPK came back within normal limits.  For full details please see H&P note.  I ordered urine chlamydia and gonorrhea due to recent history of epididymitis.  I ordered HIV test that came back negative.      I have discussed this patient's plan of care and discharge plan at IDT rounds today with Case Management, Nursing, Nursing leadership, and other members of the IDT team.    Consultants/Specialty  None    Code Status  Full Code    Disposition  <MEDICALLYCLEARED>  I have placed the appropriate orders for post-discharge needs.    Review of Systems  ROS     Physical " Exam  Temp:  [36.2 °C (97.1 °F)-37.2 °C (98.9 °F)] 36.2 °C (97.1 °F)  Pulse:  [] 80  Resp:  [15-31] 20  BP: ()/(44-74) 106/64  SpO2:  [90 %-99 %] 94 %    Physical Exam    Fluids    Intake/Output Summary (Last 24 hours) at 5/11/2024 1634  Last data filed at 5/11/2024 1400  Gross per 24 hour   Intake 4335.31 ml   Output 1080 ml   Net 3255.31 ml       Laboratory  Recent Labs     05/10/24  2250 05/11/24  0845   WBC 6.4 2.8*   RBC 5.75 4.77   HEMOGLOBIN 16.8 13.8*   HEMATOCRIT 46.8 39.6*   MCV 81.4 83.0   MCH 29.2 28.9   MCHC 35.9 34.8   RDW 40.4 41.8   PLATELETCT 162* 130*   MPV 9.8 10.2     Recent Labs     05/10/24  2250 05/11/24  0845   SODIUM 132* 131*   POTASSIUM 3.7 4.4   CHLORIDE 95* 101   CO2 21 18*   GLUCOSE 142* 241*   BUN 21 22   CREATININE 1.67* 1.32   CALCIUM 8.6 7.4*     Recent Labs     05/10/24  2250   INR 1.51*               Imaging  US-RUQ   Final Result      1.  Negative for gallstones or biliary dilation      2.  Hepatic steatosis and hepatomegaly      CT-ABDOMEN-PELVIS WITH   Final Result      1.  No bowel obstruction or perforation.   2.  Normal appendix.   3.  Nonobstructing RIGHT kidney stone.   4.  Vague areas of decreased enhancement in the lower pole RIGHT kidney posteriorly and upper pole LEFT kidney anteriorly possibly indicating focal nephritis.  Mass is difficult to entirely exclude.  Follow-up recommended.      DX-CHEST-PORTABLE (1 VIEW)   Final Result      No acute cardiopulmonary disease.           Assessment/Plan  * Sepsis with acute renal failure and septic shock, due to unspecified organism, unspecified acute renal failure type (HCC)- (present on admission)  Assessment & Plan  This is severe sepsis Present on admission  SIRS criteria identified on my evaluation include: Fever, with temperature greater than 100.9 deg F, Tachycardia, with heart rate greater than 90 BPM, and Tachypnea, with respirations greater than 20 per minute  Clinical indicators of end organ dysfunction  include Hypotension with systolic blood pressure less than 90 or MAP less than 65 and Lactic Acid greater than 2  Indicators of septic shock include: Sepsis present and persistent hypotension despite fluid resuscitation   Sources is: UTI vs undetermined  Sepsis protocol initiated  Crystalloid Fluid Administration: Fluid resuscitation ordered per standard protocol - 30 mL/kg per current or ideal body weight  IV antibiotics as appropriate for source of sepsis  Reassessment: I have reassessed the patient's hemodynamic status  F/u BC    Lactic acidosis  Assessment & Plan  LA 3.9 to 2.6   Likely secondary to GI losses, poor PO intake and in setting of severe sepsis  IVF resuscitation    ABHIJIT (acute kidney injury) (HCC)  Assessment & Plan  BUN 21, Cr 1.67 (baseline 1.0)  Likely secondary to hypovolemia from GI losses and poor PO intake  IVF resuscitation    Hypomagnesemia  Assessment & Plan  1.3, repleted  F/u with repeat Mg level    Shock liver  Assessment & Plan  AST 1370, ALT 1987, , T bili 5.4  Could be secondary to severe sepsis, autoimmune hepatitis vs less likely viral, drugs/toxin, metabolic  Patient denies any abdominal exam. H/o NAFLD.   Acetaminophen level negative, hepatitis non reactive  IVF resuscitation  F/u Mac's, ASMA, AMA  F/u RUQ U/S    Antiphospholipid syndrome (HCC)- (present on admission)  Assessment & Plan  C/b CVA 2019  Continue warfarin    Cerebrovascular accident (CVA) due to vascular occlusion (HCC)- (present on admission)  Assessment & Plan  H/o, 2019. 2/2 antiphospholipid syndrome.    JAYLIN (obstructive sleep apnea)- (present on admission)  Assessment & Plan  CPAP prn and at night  RT protocol  Pulm referral for sleep apnea testing ordered for outpatient    Type 2 diabetes mellitus without complication, without long-term current use of insulin (HCC)- (present on admission)  Assessment & Plan  SSI, hypoglycemia protocol    Morbid obesity (HCC)- (present on admission)  Assessment &  Plan  Nutrition consulted  Outpatient follow up    Hypertension- (present on admission)  Assessment & Plan  Home antihypertensives held in setting of severe sepsis           VTE prophylaxis: VTE Selection    I have performed a physical exam and reviewed and updated ROS and Plan today (5/11/2024). In review of yesterday's note (5/10/2024), there are no changes except as documented above.

## 2024-05-11 NOTE — ED TRIAGE NOTES
Chief Complaint   Patient presents with    Chills     Pt reports he takes Coumadin for Lupus- has been on abx for wound to leg and epididymitis.  Pt reports chills recently.        Pt to triage via w/c for above complaint. Presents with chills and concerns for coumadin levels. Pt is hypotensive and tachycardic.     Charge notified, pt taken to [facility]    Vitals:    05/10/24 2238   BP: (!) 87/53   Pulse:    Resp:    Temp:    SpO2:

## 2024-05-11 NOTE — ASSESSMENT & PLAN NOTE
AST 1370, ALT 1987, , T bili 5.4  Could be secondary to severe sepsis, autoimmune hepatitis vs less likely viral, drugs/toxin, metabolic  Patient denies any abdominal exam. H/o NAFLD.   Acetaminophen level negative  IVF resuscitation  F/u Mac's, ASMA, AMA  I reviewed ultrasound right upper quadrant that did not show obstruction or gallstones.  Acute hepatitis panel came back negative.  Liver enzymes has been trending down.  I ordered CMP for tomorrow.

## 2024-05-11 NOTE — CARE PLAN
The patient is Stable - Low risk of patient condition declining or worsening    Shift Goals  Clinical Goals: Hemodynamic stability, VSS, improved liver function  Patient Goals: fix kidney problems  Family Goals: LEYLA    Progress made toward(s) clinical / shift goals:      Problem: Knowledge Deficit - Standard  Goal: Patient and family/care givers will demonstrate understanding of plan of care, disease process/condition, diagnostic tests and medications  Outcome: Progressing     Problem: Hemodynamics  Goal: Patient's hemodynamics, fluid balance and neurologic status will be stable or improve  Outcome: Progressing     Problem: Fluid Volume  Goal: Fluid volume balance will be maintained  Outcome: Progressing     Problem: Respiratory  Goal: Patient will achieve/maintain optimum respiratory ventilation and gas exchange  Outcome: Progressing     Problem: Psychosocial  Goal: Patient's level of anxiety will decrease  Outcome: Progressing  Goal: Patient's ability to verbalize feelings about condition will improve  Outcome: Progressing     Problem: Urinary Elimination  Goal: Establish and maintain regular urinary output  Outcome: Progressing

## 2024-05-12 LAB
ALBUMIN SERPL BCP-MCNC: 2.7 G/DL (ref 3.2–4.9)
ALBUMIN/GLOB SERPL: 0.8 G/DL
ALP SERPL-CCNC: 96 U/L (ref 30–99)
ALT SERPL-CCNC: 1031 U/L (ref 2–50)
ANION GAP SERPL CALC-SCNC: 12 MMOL/L (ref 7–16)
AST SERPL-CCNC: 440 U/L (ref 12–45)
BASOPHILS # BLD AUTO: 0.2 % (ref 0–1.8)
BASOPHILS # BLD: 0.01 K/UL (ref 0–0.12)
BILIRUB SERPL-MCNC: 3.2 MG/DL (ref 0.1–1.5)
BUN SERPL-MCNC: 27 MG/DL (ref 8–22)
C TRACH DNA SPEC QL NAA+PROBE: NEGATIVE
CALCIUM ALBUM COR SERPL-MCNC: 8.8 MG/DL (ref 8.5–10.5)
CALCIUM SERPL-MCNC: 7.8 MG/DL (ref 8.5–10.5)
CHLORIDE SERPL-SCNC: 102 MMOL/L (ref 96–112)
CO2 SERPL-SCNC: 19 MMOL/L (ref 20–33)
CREAT SERPL-MCNC: 1.17 MG/DL (ref 0.5–1.4)
EOSINOPHIL # BLD AUTO: 0.02 K/UL (ref 0–0.51)
EOSINOPHIL NFR BLD: 0.3 % (ref 0–6.9)
ERYTHROCYTE [DISTWIDTH] IN BLOOD BY AUTOMATED COUNT: 43.1 FL (ref 35.9–50)
GFR SERPLBLD CREATININE-BSD FMLA CKD-EPI: 80 ML/MIN/1.73 M 2
GLOBULIN SER CALC-MCNC: 3.5 G/DL (ref 1.9–3.5)
GLUCOSE BLD STRIP.AUTO-MCNC: 161 MG/DL (ref 65–99)
GLUCOSE BLD STRIP.AUTO-MCNC: 182 MG/DL (ref 65–99)
GLUCOSE BLD STRIP.AUTO-MCNC: 221 MG/DL (ref 65–99)
GLUCOSE BLD STRIP.AUTO-MCNC: 225 MG/DL (ref 65–99)
GLUCOSE SERPL-MCNC: 252 MG/DL (ref 65–99)
HCT VFR BLD AUTO: 38.9 % (ref 42–52)
HGB BLD-MCNC: 13.5 G/DL (ref 14–18)
IMM GRANULOCYTES # BLD AUTO: 0.03 K/UL (ref 0–0.11)
IMM GRANULOCYTES NFR BLD AUTO: 0.5 % (ref 0–0.9)
INR PPP: 2.58 (ref 0.87–1.13)
LYMPHOCYTES # BLD AUTO: 0.57 K/UL (ref 1–4.8)
LYMPHOCYTES NFR BLD: 9.9 % (ref 22–41)
MAGNESIUM SERPL-MCNC: 2 MG/DL (ref 1.5–2.5)
MCH RBC QN AUTO: 28.9 PG (ref 27–33)
MCHC RBC AUTO-ENTMCNC: 34.7 G/DL (ref 32.3–36.5)
MCV RBC AUTO: 83.3 FL (ref 81.4–97.8)
MONOCYTES # BLD AUTO: 0.47 K/UL (ref 0–0.85)
MONOCYTES NFR BLD AUTO: 8.2 % (ref 0–13.4)
N GONORRHOEA DNA SPEC QL NAA+PROBE: NEGATIVE
NEUTROPHILS # BLD AUTO: 4.63 K/UL (ref 1.82–7.42)
NEUTROPHILS NFR BLD: 80.9 % (ref 44–72)
NRBC # BLD AUTO: 0 K/UL
NRBC BLD-RTO: 0 /100 WBC (ref 0–0.2)
PHOSPHATE SERPL-MCNC: 2.2 MG/DL (ref 2.5–4.5)
PLATELET # BLD AUTO: 158 K/UL (ref 164–446)
PMV BLD AUTO: 10.1 FL (ref 9–12.9)
POTASSIUM SERPL-SCNC: 4.6 MMOL/L (ref 3.6–5.5)
PROT SERPL-MCNC: 6.2 G/DL (ref 6–8.2)
PROTHROMBIN TIME: 28 SEC (ref 12–14.6)
RBC # BLD AUTO: 4.67 M/UL (ref 4.7–6.1)
SODIUM SERPL-SCNC: 133 MMOL/L (ref 135–145)
SPECIMEN SOURCE: NORMAL
WBC # BLD AUTO: 5.7 K/UL (ref 4.8–10.8)

## 2024-05-12 PROCEDURE — 99233 SBSQ HOSP IP/OBS HIGH 50: CPT | Performed by: INTERNAL MEDICINE

## 2024-05-12 RX ORDER — WARFARIN SODIUM 2.5 MG/1
2.5 TABLET ORAL
Status: DISCONTINUED | OUTPATIENT
Start: 2024-05-12 | End: 2024-05-12

## 2024-05-12 RX ORDER — DOXYCYCLINE 100 MG/1
100 TABLET ORAL EVERY 12 HOURS
Status: DISCONTINUED | OUTPATIENT
Start: 2024-05-12 | End: 2024-05-13 | Stop reason: HOSPADM

## 2024-05-12 RX ADMIN — INSULIN HUMAN 3 UNITS: 100 INJECTION, SOLUTION PARENTERAL at 17:50

## 2024-05-12 RX ADMIN — DIBASIC SODIUM PHOSPHATE, MONOBASIC POTASSIUM PHOSPHATE AND MONOBASIC SODIUM PHOSPHATE 250 MG: 852; 155; 130 TABLET ORAL at 17:45

## 2024-05-12 RX ADMIN — DOXYCYCLINE 100 MG: 100 TABLET, FILM COATED ORAL at 17:45

## 2024-05-12 RX ADMIN — INSULIN HUMAN 3 UNITS: 100 INJECTION, SOLUTION PARENTERAL at 14:11

## 2024-05-12 RX ADMIN — CEFTRIAXONE SODIUM 2000 MG: 10 INJECTION, POWDER, FOR SOLUTION INTRAVENOUS at 12:56

## 2024-05-12 RX ADMIN — INSULIN HUMAN 2 UNITS: 100 INJECTION, SOLUTION PARENTERAL at 09:51

## 2024-05-12 RX ADMIN — DOXYCYCLINE 100 MG: 100 TABLET, FILM COATED ORAL at 11:27

## 2024-05-12 RX ADMIN — DIBASIC SODIUM PHOSPHATE, MONOBASIC POTASSIUM PHOSPHATE AND MONOBASIC SODIUM PHOSPHATE 250 MG: 852; 155; 130 TABLET ORAL at 23:39

## 2024-05-12 RX ADMIN — DIBASIC SODIUM PHOSPHATE, MONOBASIC POTASSIUM PHOSPHATE AND MONOBASIC SODIUM PHOSPHATE 250 MG: 852; 155; 130 TABLET ORAL at 11:27

## 2024-05-12 RX ADMIN — INSULIN HUMAN 2 UNITS: 100 INJECTION, SOLUTION PARENTERAL at 20:41

## 2024-05-12 RX ADMIN — PIPERACILLIN SODIUM AND TAZOBACTAM SODIUM 3.38 G: 3; .375 INJECTION, POWDER, LYOPHILIZED, FOR SOLUTION INTRAVENOUS at 04:58

## 2024-05-12 ASSESSMENT — LIFESTYLE VARIABLES
HOW MANY TIMES IN THE PAST YEAR HAVE YOU HAD 5 OR MORE DRINKS IN A DAY: 0
EVER HAD A DRINK FIRST THING IN THE MORNING TO STEADY YOUR NERVES TO GET RID OF A HANGOVER: NO
TOTAL SCORE: 0
TOTAL SCORE: 0
ON A TYPICAL DAY WHEN YOU DRINK ALCOHOL HOW MANY DRINKS DO YOU HAVE: 0
TOTAL SCORE: 0
SUBSTANCE_ABUSE: 0
AVERAGE NUMBER OF DAYS PER WEEK YOU HAVE A DRINK CONTAINING ALCOHOL: 0
HAVE PEOPLE ANNOYED YOU BY CRITICIZING YOUR DRINKING: NO
EVER FELT BAD OR GUILTY ABOUT YOUR DRINKING: NO
ALCOHOL_USE: NO
CONSUMPTION TOTAL: NEGATIVE
HAVE YOU EVER FELT YOU SHOULD CUT DOWN ON YOUR DRINKING: NO

## 2024-05-12 ASSESSMENT — ENCOUNTER SYMPTOMS
SPEECH CHANGE: 0
SENSORY CHANGE: 0
HALLUCINATIONS: 0
WEIGHT LOSS: 0
PHOTOPHOBIA: 0
DOUBLE VISION: 0
COUGH: 0
PALPITATIONS: 0
ORTHOPNEA: 0
SHORTNESS OF BREATH: 0
DIARRHEA: 0
NAUSEA: 0
FEVER: 0
NECK PAIN: 0
BLURRED VISION: 0
CONSTIPATION: 0
BACK PAIN: 0
HEADACHES: 0
ABDOMINAL PAIN: 0
SPUTUM PRODUCTION: 0
TREMORS: 0
FOCAL WEAKNESS: 0
VOMITING: 0
EYE PAIN: 0
CHILLS: 0
DIZZINESS: 0
MYALGIAS: 0
TINGLING: 0

## 2024-05-12 ASSESSMENT — PAIN DESCRIPTION - PAIN TYPE
TYPE: ACUTE PAIN

## 2024-05-12 ASSESSMENT — COGNITIVE AND FUNCTIONAL STATUS - GENERAL
MOBILITY SCORE: 24
DAILY ACTIVITIY SCORE: 24
SUGGESTED CMS G CODE MODIFIER DAILY ACTIVITY: CH
SUGGESTED CMS G CODE MODIFIER MOBILITY: CH

## 2024-05-12 ASSESSMENT — FIBROSIS 4 INDEX
FIB4 SCORE: 3.56
FIB4 SCORE: 3.56

## 2024-05-12 ASSESSMENT — PATIENT HEALTH QUESTIONNAIRE - PHQ9
2. FEELING DOWN, DEPRESSED, IRRITABLE, OR HOPELESS: NOT AT ALL
SUM OF ALL RESPONSES TO PHQ9 QUESTIONS 1 AND 2: 0
1. LITTLE INTEREST OR PLEASURE IN DOING THINGS: NOT AT ALL

## 2024-05-12 NOTE — PROGRESS NOTES
4 Eyes Skin Assessment Completed by KENDRA Rajan and KENDRA Godinez.     Head WDL  Ears WDL  Nose WDL  Mouth WDL  Neck WDL  Breast/Chest WDL  Shoulder Blades WDL  Spine WDL  (R) Arm/Elbow/Hand Redness and Blanching  (L) Arm/Elbow/Hand Redness and Blanching  Abdomen WDL  Groin LEYLA patient refused  Scrotum/Coccyx/Buttocks Redness and Blanching  (R) Leg Redness, Scab  (L) Leg Bruising, Ulceration/open wound present  (R) Heel/Foot/Toe Redness and Blanching  (L) Heel/Foot/Toe Redness and Blanching              Devices In Places Tele Box, Blood Pressure Cuff, and Pulse Ox        Interventions In Place Pillows and Low Air Loss Mattress     Possible Skin Injury Yes     Pictures Uploaded Into Epic Yes  Wound Consult Placed Yes  RN Wound Prevention Protocol Ordered No

## 2024-05-12 NOTE — CARE PLAN
The patient is Stable - Low risk of patient condition declining or worsening    Shift Goals  Clinical Goals: hemodynamic stability, abx, mobility  Patient Goals: feel better  Family Goals: LEYLA    Progress made toward(s) clinical / shift goals:    Problem: Knowledge Deficit - Standard  Goal: Patient and family/care givers will demonstrate understanding of plan of care, disease process/condition, diagnostic tests and medications  Outcome: Progressing     Problem: Hemodynamics  Goal: Patient's hemodynamics, fluid balance and neurologic status will be stable or improve  Outcome: Progressing     Problem: Urinary - Renal Perfusion  Goal: Ability to achieve and maintain adequate renal perfusion and functioning will improve  Outcome: Progressing     Problem: Respiratory  Goal: Patient will achieve/maintain optimum respiratory ventilation and gas exchange  Outcome: Progressing     Problem: Urinary Elimination  Goal: Establish and maintain regular urinary output  Outcome: Progressing     Problem: Bowel Elimination  Goal: Establish and maintain regular bowel function  Outcome: Progressing       Patient is not progressing towards the following goals:      Problem: Pain - Standard  Goal: Alleviation of pain or a reduction in pain to the patient’s comfort goal  Outcome: Not Progressing  Pt states he is in pain but does not want to take pain medications offered. Education provided.

## 2024-05-12 NOTE — PROGRESS NOTES
Received bedside report From RN. Patient is A&Ox4 and denies having pain at this time. Physical assessment completed. Patient belongings and call light are within reach. Plan of care is ongoing

## 2024-05-12 NOTE — CARE PLAN
The patient is Stable - Low risk of patient condition declining or worsening    Shift Goals  Clinical Goals: VSS, monitor labs, abx  Patient Goals: get better  Family Goals: LEYLA    Progress made toward(s) clinical / shift goals:    Problem: Knowledge Deficit - Standard  Goal: Patient and family/care givers will demonstrate understanding of plan of care, disease process/condition, diagnostic tests and medications  Outcome: Progressing     Problem: Urinary - Renal Perfusion  Goal: Ability to achieve and maintain adequate renal perfusion and functioning will improve  Outcome: Progressing     Problem: Urinary Elimination  Goal: Establish and maintain regular urinary output  Outcome: Progressing       Patient is not progressing towards the following goals:

## 2024-05-12 NOTE — PROGRESS NOTES
Pharmacy Warfarin Monitoring     Date: 5/12/2024  Reason for Anticoagulation: Stroke, Antiphospholipid syndrome  Target INR: 2.0 to 3.0      Hemoglobin Value: (!) 13.5  Hematocrit Value: (!) 38.9  Lab Platelet Value: (!) 158    INR from last 7 days       Date/Time INR Value    05/12/24 0347 2.58    05/10/24 2250 1.51          Dose from last 7 days       Date/Time Dose (mg)    05/12/24 1601 0    05/11/24 1601 5    05/11/24 0433 5          Average Dose (mg): 5 mg daily   Significant Interactions: Not Applicable  Bridge Therapy: Not indicated, INR therapeutic    Reversal Agent Administered: Not Applicable  Comments: INR therapeutic but with large increase from yesterday.  Will hold warfarin tonight and monitor daily INR until stable.    Education Material Provided?: No - chronic warfarin patient  Pharmacist suggested discharge dosing: TBD pending INR trends      Thank you!  Kary Perkins, PharmD, BCCCP

## 2024-05-12 NOTE — PROGRESS NOTES
4 Eyes Skin Assessment Completed by KENDRA Street and KENDRA Bradley.    Head WDL  Ears Redness  Nose WDL  Mouth WDL  Neck WDL  Breast/Chest WDL  Shoulder Blades WDL  Spine WDL  (R) Arm/Elbow/Hand Redness, Blanching, and Bruising  (L) Arm/Elbow/Hand Redness, blanching , bruising  Abdomen Scar and Bruising on right later side  Groin Redness, Excoriation, and Rash  Scrotum/Coccyx/Buttocks Redness and Blanching  (R) Leg Redness and Scab  (L) Leg Redness, Bruising, and Edema, ulceration medial ankle and lateral ankle scar  (R) Heel/Foot/Toe Redness, Blanching, and Edema  (L) Heel/Foot/Toe Redness, Blanching, and Edema          Devices In Places Tele Box, Blood Pressure Cuff, Pulse Ox, and Nasal Cannula      Interventions In Place Heel Mepilex, Sacral Mepilex, Pillows, Low Air Loss Mattress, and Heels Loaded W/Pillows    Possible Skin Injury Yes    Pictures Uploaded Into Epic Yes  Wound Consult Placed Yes  RN Wound Prevention Protocol Ordered No

## 2024-05-12 NOTE — DIETARY
Nutrition Services: Diabetes, Obesity Education Consult   Day 1 of admit.  Christ Calixto is a 39 y.o. male with admitting DX of Sepsis with acute renal failure and septic shock, due to unspecified organism, unspecified acute renal failure type    RD able to visit pt at bedside to provide consistent CHO (diabetic) diet education. RD discussed consistent CHO meal pattern, adequate protein, fat and fiber with carbohydrates, importance of eating breakfast and bedtime snack, and appropriate beverages (water or sugar free).      Pt with BMI >40 (=Body mass index is 54.38 kg/m².), Class III obesity. RD reviewed the plate method, and heart healthy, mediterranean diet modifications with pt (weight loss counseling not appropriate in acute care setting).     RD provided handouts reinforcing topics discussed. Pt demonstrated appropriate readiness and adequate evidence of learning. RD able to answer all questions to patient's satisfaction.     No other education needs identified at this time. Consider referral to outpatient nutrition services for continuation of education as indicated or per pt preferences.     RECOMMEND - If appropriate at DC please refer to outpatient nutrition services for weight management.      Please re-consult RD as indicated.

## 2024-05-13 ENCOUNTER — PHARMACY VISIT (OUTPATIENT)
Dept: PHARMACY | Facility: MEDICAL CENTER | Age: 39
End: 2024-05-13
Payer: COMMERCIAL

## 2024-05-13 VITALS
OXYGEN SATURATION: 96 % | HEART RATE: 66 BPM | TEMPERATURE: 97.2 F | WEIGHT: 315 LBS | SYSTOLIC BLOOD PRESSURE: 120 MMHG | RESPIRATION RATE: 15 BRPM | BODY MASS INDEX: 44.1 KG/M2 | HEIGHT: 71 IN | DIASTOLIC BLOOD PRESSURE: 79 MMHG

## 2024-05-13 LAB
ALBUMIN SERPL BCP-MCNC: 2.8 G/DL (ref 3.2–4.9)
ALBUMIN/GLOB SERPL: 0.8 G/DL
ALP SERPL-CCNC: 87 U/L (ref 30–99)
ALT SERPL-CCNC: 720 U/L (ref 2–50)
ANION GAP SERPL CALC-SCNC: 12 MMOL/L (ref 7–16)
AST SERPL-CCNC: 218 U/L (ref 12–45)
BACTERIA UR CULT: NORMAL
BILIRUB SERPL-MCNC: 2.3 MG/DL (ref 0.1–1.5)
BUN SERPL-MCNC: 24 MG/DL (ref 8–22)
CALCIUM ALBUM COR SERPL-MCNC: 9.2 MG/DL (ref 8.5–10.5)
CALCIUM SERPL-MCNC: 8.2 MG/DL (ref 8.5–10.5)
CHLORIDE SERPL-SCNC: 105 MMOL/L (ref 96–112)
CK SERPL-CCNC: 67 U/L (ref 0–154)
CO2 SERPL-SCNC: 17 MMOL/L (ref 20–33)
COPPER SERPL-MCNC: 122.3 UG/DL (ref 70–140)
CREAT SERPL-MCNC: 0.92 MG/DL (ref 0.5–1.4)
EKG IMPRESSION: NORMAL
ERYTHROCYTE [DISTWIDTH] IN BLOOD BY AUTOMATED COUNT: 44.2 FL (ref 35.9–50)
GFR SERPLBLD CREATININE-BSD FMLA CKD-EPI: 107 ML/MIN/1.73 M 2
GLOBULIN SER CALC-MCNC: 3.5 G/DL (ref 1.9–3.5)
GLUCOSE BLD STRIP.AUTO-MCNC: 112 MG/DL (ref 65–99)
GLUCOSE SERPL-MCNC: 159 MG/DL (ref 65–99)
HCT VFR BLD AUTO: 41.6 % (ref 42–52)
HGB BLD-MCNC: 14.1 G/DL (ref 14–18)
INR PPP: 2.86 (ref 0.87–1.13)
MAGNESIUM SERPL-MCNC: 1.7 MG/DL (ref 1.5–2.5)
MCH RBC QN AUTO: 28.7 PG (ref 27–33)
MCHC RBC AUTO-ENTMCNC: 33.9 G/DL (ref 32.3–36.5)
MCV RBC AUTO: 84.6 FL (ref 81.4–97.8)
PHOSPHATE SERPL-MCNC: 2.4 MG/DL (ref 2.5–4.5)
PLATELET # BLD AUTO: 136 K/UL (ref 164–446)
PMV BLD AUTO: 10.5 FL (ref 9–12.9)
POTASSIUM SERPL-SCNC: 4.4 MMOL/L (ref 3.6–5.5)
PROT SERPL-MCNC: 6.3 G/DL (ref 6–8.2)
PROTHROMBIN TIME: 30.4 SEC (ref 12–14.6)
RBC # BLD AUTO: 4.92 M/UL (ref 4.7–6.1)
SIGNIFICANT IND 70042: NORMAL
SITE SITE: NORMAL
SODIUM SERPL-SCNC: 134 MMOL/L (ref 135–145)
SOURCE SOURCE: NORMAL
WBC # BLD AUTO: 4.5 K/UL (ref 4.8–10.8)

## 2024-05-13 PROCEDURE — 93010 ELECTROCARDIOGRAM REPORT: CPT | Performed by: INTERNAL MEDICINE

## 2024-05-13 PROCEDURE — RXMED WILLOW AMBULATORY MEDICATION CHARGE: Performed by: INTERNAL MEDICINE

## 2024-05-13 PROCEDURE — 99239 HOSP IP/OBS DSCHRG MGMT >30: CPT | Performed by: INTERNAL MEDICINE

## 2024-05-13 RX ORDER — DOXYCYCLINE 100 MG/1
100 TABLET ORAL EVERY 12 HOURS
Qty: 10 TABLET | Refills: 0 | Status: ACTIVE | OUTPATIENT
Start: 2024-05-13 | End: 2024-05-18

## 2024-05-13 RX ORDER — AMOXICILLIN AND CLAVULANATE POTASSIUM 875; 125 MG/1; MG/1
1 TABLET, FILM COATED ORAL 2 TIMES DAILY
Qty: 10 TABLET | Refills: 0 | Status: ACTIVE | OUTPATIENT
Start: 2024-05-13 | End: 2024-05-18

## 2024-05-13 RX ADMIN — DIBASIC SODIUM PHOSPHATE, MONOBASIC POTASSIUM PHOSPHATE AND MONOBASIC SODIUM PHOSPHATE 250 MG: 852; 155; 130 TABLET ORAL at 04:20

## 2024-05-13 RX ADMIN — DOXYCYCLINE 100 MG: 100 TABLET, FILM COATED ORAL at 04:20

## 2024-05-13 ASSESSMENT — PAIN DESCRIPTION - PAIN TYPE: TYPE: ACUTE PAIN

## 2024-05-13 ASSESSMENT — FIBROSIS 4 INDEX: FIB4 SCORE: 2.45

## 2024-05-13 NOTE — PROGRESS NOTES
4 Eyes Skin Assessment Completed by KENDRA Tate and KENDRA Tran.    Head WDL  Ears WDL  Nose WDL  Mouth WDL  Neck WDL  Breast/Chest WDL  Shoulder Blades WDL  Spine WDL  (R) Arm/Elbow/Hand WDL  (L) Arm/Elbow/Hand WDL  Abdomen WDL  Groin WDL  Scrotum/Coccyx/Buttocks WDL  (R) Leg WDL  (L) Leg Abrasion old wound  (R) Heel/Foot/Toe WDL  (L) Heel/Foot/Toe WDL          Devices In Places Tele Box, Blood Pressure Cuff, and Pulse Ox      Interventions In Place Pillows    Possible Skin Injury No    Pictures Uploaded Into Epic N/A  Wound Consult Placed N/A  RN Wound Prevention Protocol Ordered No

## 2024-05-13 NOTE — CARE PLAN
The patient is Stable - Low risk of patient condition declining or worsening    Shift Goals  Clinical Goals: Hemodynamic stability, monitor vitals and labs  Patient Goals: Comfort  Family Goals: LEYLA    Progress made toward(s) clinical / shift goals:    Problem: Knowledge Deficit - Standard  Goal: Patient and family/care givers will demonstrate understanding of plan of care, disease process/condition, diagnostic tests and medications  Outcome: Progressing       Patient is not progressing towards the following goals:

## 2024-05-13 NOTE — PROGRESS NOTES
Notified by monitors that patient potentially going in and out of a-fib. Vitals obtained and charted, EKG ordered, notified hospitalist Alison Lee, no new orders.

## 2024-05-13 NOTE — DISCHARGE SUMMARY
Discharge Summary    CHIEF COMPLAINT ON ADMISSION  Chief Complaint   Patient presents with    Chills     Pt reports he takes Coumadin for Lupus- has been on abx for wound to leg and epididymitis.  Pt reports chills recently.        Reason for Admission  Other     Admission Date  5/10/2024    CODE STATUS  Prior    HPI & HOSPITAL COURSE    39-year-old male with history of antiphospholipid syndrome on warfarin, dyslipidemia, hypertension and obesity with a sleep apnea who presented 5/10 with feeling crappy.  His symptoms for couple days, with nausea and vomiting, mild back pain, patient was found to have low blood pressure on admission 89/52 and tachycardia 107, initially patient was admitted to [facility], labs showed normal white blood cell however showed elevated liver enzymes with normal kidney function, patient was admitted for possible septic shock and multiorgan failure.  IV fluid was initiated with antibiotics, blood culture and urine culture was negative, patient has wound on his left leg and is being followed by wound care, initially patient was admitted to his symptoms were improved and blood pressure was stable, no leukocytosis, patient will be discharged home with antibiotic to finish course of 10 days, wound care referral was placed.    CT scan of her abdomen showed left kidney focal nephritis, mass was not excluded, patient need to follow-up repeating CT scan.      Patient was found to have elevated liver enzymes, ALT 1246 and , with bilirubin 4.9, ultrasound was negative for gallstones or biliary dilation, Patient denied any history of alcoholism, autoimmune disease including AMA and smooth muscle antibodies was ordered, his liver enzymes were improved, GI referral was placed, discussed the importance of following with PCP and GI in clinic to rule out autoimmune disease since patient has already antiphospholipid syndrome, patient understood and agreed.    On the day of discharge the patient was alert  oriented x 4, denied any symptoms, no fever or chills, labs are at baseline, improving on liver enzymes, discussed the plan of care with the patient in detail, discussed the importance of following with PCP and GI clinic for images and following liver enzymes, patient will be discharged with a course of antibiotics, encouraged the patient to come back to the emergency room for any new symptoms or abdominal pain.      Therefore, he is discharged in good and stable condition to home with close outpatient follow-up.    The patient met 2-midnight criteria for an inpatient stay at the time of discharge.    Discharge Date  05/13/24    FOLLOW UP ITEMS POST DISCHARGE  Follow-up with GI for elevated liver enzymes and to follow the result of labs  Follow-up with PCP for elevated liver enzymes and comorbidities with diabetes and hypertension    DISCHARGE DIAGNOSES  Principal Problem:    Sepsis with acute renal failure and septic shock, due to unspecified organism, unspecified acute renal failure type (HCC) (POA: Yes)  Active Problems:    Hypertension (POA: Yes)    Morbid obesity (HCC) (POA: Yes)    Type 2 diabetes mellitus without complication, without long-term current use of insulin (HCC) (POA: Yes)    JAYLIN (obstructive sleep apnea) (POA: Yes)    Cerebrovascular accident (CVA) due to vascular occlusion (HCC) (POA: Yes)    Antiphospholipid syndrome (HCC) (POA: Yes)    Shock liver (POA: Unknown)    Hypomagnesemia (POA: Unknown)    ABHIJIT (acute kidney injury) (HCC) (POA: Unknown)    Lactic acidosis (POA: Unknown)  Resolved Problems:    * No resolved hospital problems. *      FOLLOW UP  Future Appointments   Date Time Provider Department Center   5/13/2024 10:30 AM Doug Bond M.D. [facility] [address] [place] [address]   5/20/2024  1:00 PM Rosaura Hopson R.N. [facility] [address] [place] [address]   5/27/2024  2:30 PM Rosaura Hopson R.N. [facility] [address] [place] [address]     SABINA Williamson  [address] [place] [id number]  [phone]    Schedule an appointment as soon  as possible for a visit in 1 week(s)        MEDICATIONS ON DISCHARGE     Medication List        START taking these medications        Instructions   amoxicillin-clavulanate 875-125 MG Tabs  Commonly known as: Augmentin   Take 1 Tablet by mouth 2 times a day for 5 days.  Dose: 1 Tablet     doxycycline monohydrate 100 MG tablet  Commonly known as: Adoxa   Take 1 Tablet by mouth every 12 hours for 5 days.  Dose: 100 mg            CHANGE how you take these medications        Instructions   atorvastatin 20 MG Tabs  What changed:   how much to take  how to take this  when to take this  additional instructions  Commonly known as: Lipitor   Take 1 tablet by mouth nightly     glyBURIDE-metFORMIN 5-500 MG Tabs  What changed:   how much to take  how to take this  when to take this  Commonly known as: Glucovance   TAKE 1 TABLET BY MOUTH IN THE MORNING WITH BREAKFAST            CONTINUE taking these medications        Instructions   acetaminophen 500 MG Tabs  Commonly known as: Tylenol   Take 1,000 mg by mouth 1 time a day as needed for Mild Pain.  Dose: 1,000 mg     lisinopril-hydrochlorothiazide 20-25 MG per tablet  Commonly known as: Prinzide   Take 1 tablet by mouth every day.  Dose: 1 Tablet     vitamin D 2000 UNIT Tabs   Take 2,000 Units by mouth every day.  Dose: 2,000 Units     warfarin 5 MG Tabs  Commonly known as: Coumadin   Take 1 Tablet by mouth every day.  Dose: 1 Tablet            STOP taking these medications      ciprofloxacin 500 MG Tabs  Commonly known as: Cipro              Allergies  No Known Allergies    DIET  No orders of the defined types were placed in this encounter.      ACTIVITY  As tolerated.  Weight bearing as tolerated    CONSULTATIONS  None    PROCEDURES  None    LABORATORY  Lab Results   Component Value Date    SODIUM 134 (L) 05/13/2024    POTASSIUM 4.4 05/13/2024    CHLORIDE 105 05/13/2024    CO2 17 (L) 05/13/2024    GLUCOSE 159 (H) 05/13/2024    BUN 24 (H) 05/13/2024    CREATININE 0.92  05/13/2024        Lab Results   Component Value Date    WBC 4.5 (L) 05/13/2024    HEMOGLOBIN 14.1 05/13/2024    HEMATOCRIT 41.6 (L) 05/13/2024    PLATELETCT 136 (L) 05/13/2024        Total time of the discharge process exceeds 35 minutes.

## 2024-05-13 NOTE — PROGRESS NOTES
Report received from Monica PHELAN Patient transported to room. Patient placed on tele. On Room air. A&O x4. Oriented to room. Educated on fall risk, all fall precautions in place. Call light within reach, bed locked and in lowest position, denied other needs at this time.

## 2024-05-13 NOTE — DISCHARGE INSTRUCTIONS
Understanding Sepsis    Sepsis is a life-threatening problem that affects your organs. It can happen if you have a severe infection. It's most often caused by bacteria. It ranges in severity from sepsis to severe sepsis to septic shock. All of these are a medical emergency. They need to be treated right away.    What is Sepsis?    Sepsis is when the body reacts to an infection with severe inflammation. It can be caused by bacteria, fungus, or a virus. Sepsis can cause many kinds of problems in the body. It can lead to severe low blood pressure (shock). It can cause organ failure. This can lead to death if not treated.    Sepsis is most common in:    Adults 65 years and older  Patients in an intensive care unit (ICU)  People who have a central venous line or urinary catheter  People with a blood infection (bacteremia), pneumonia, meningitis, or a urinary tract infection  People with some cancers, diabetes, or long-term kidney or liver disease  People with immune system diseases, such as HIV or AIDS  People who had an organ transplant or bone marrow or stem cell transplant  People taking medicines that affect the immune system  People being treated with chemotherapy, steroid medicines, or radiation  People with severe injuries, including burns    Symptoms of Sepsis    Symptoms of sepsis can include:    Chills and shaking  High fever  Low blood pressure  Fast heartbeat  Fast breathing  Shortness of breath  Severe nausea or uncontrolled vomiting  Confusion  Not able to be awake or aware (coma)  Dizziness  Less urination  Severe pain, including in the back or joints     Diagnosing Sepsis    If your healthcare provider thinks you may have sepsis, you will be admitted to the hospital. You will have tests. You may have blood and urine tests. You may have cultures and other tests to look for the cause of the sepsis. These tests look for bacteria, viruses, and fungus. Other tests may check for problems with your organs. You  may have X-rays or other imaging tests. These may be done to look at your organs to find the source of infection.    Treating Sepsis    All forms of sepsis are a medical emergency. They must be treated in the hospital, often in the intensive care unit (ICU). If you have sepsis, your healthcare provider will give you antibiotics through a thin, flexible tube (IV). This is put into a vein in your arm or other area in your body. You will be given a large amount of fluids through the IV. You may be given nutrition or medicines through your IV.    Your healthcare provider will talk with you about other treatments you may need. These may include an oxygen mask or a ventilator to help you breathe. This may include medicine that raises your blood pressure. You might need dialysis for kidney failure. Treatment may last at least 7 to 10 days. Even with a lot of treatment, sepsis can lead to death.    © 2192-0617 [employer]. All rights reserved. This information is not intended as a substitute for professional medical care. Always follow your healthcare professional's instructions.

## 2024-05-13 NOTE — PROGRESS NOTES
"Hospital Medicine Daily Progress Note    Date of Service  5/12/2024    Chief Complaint  Christ Calixto is a 39 y.o. male admitted 5/10/2024 with \"feeling crappy\"    Hospital Course    Christ is a 39 y.o. male with PMHx antiphospholipid syndrome on coumadin c/b CVA, T2DM, HTN, HLD, chronic LLE wound, morbid obesity, JAYLIN, tobacco use, who presented 5/10/2024 after worsening \"feeling crappy\" onset 3 days ago. He states that he had chills, N/V and diarrhea for the last 3 days. He thought that his \"epididymitis\" had come back, and he was prescribed an additional 14 day course of ciprofloxacin yesterday, and was only able to take one dose before getting sick with N/V and dizziness/lightheadedness. He states that he started peeing \"orange\" 3 days ago. He reports dry mouth and feeling thirsty - \"I want to drink a gallon of water.\" No abdominal pain, F/C, SOB, CP, palpitations. No sick contacts.      Previous antibiotic was 10 day course of unknown antibiotic for LLE wound and then 5 day course of cipro for epididymitis at end of 03/2024.    Interval Problem Update    05/11/24    I evaluated and examined him at the bedside.  Patient admitted by my colleague this morning.  He reported that he is feeling better.  He denies scrotal pain and dysuria.  I am continuing IV antibiotics.  I ordered repeat lab workup that showed improvement in liver enzymes.  CPK came back within normal limits.  For full details please see H&P note.  I ordered urine chlamydia and gonorrhea due to recent history of epididymitis.  I ordered HIV test that came back negative.    05/12/24    I have noted and examined him at the bedside.  He reported that he is feeling better pain  His liver enzymes has been trending down.  I discussed plan of care with him.  Urine chlamydia and gonorrhea came back negative.  Plan is to recheck his liver enzymes tomorrow.  Plan is to transfer him out from [facility].      I have discussed this patient's plan of care and " discharge plan at IDT rounds today with Case Management, Nursing, Nursing leadership, and other members of the IDT team.    Consultants/Specialty  None    Code Status  Full Code    Disposition  Medically Cleared  I have placed the appropriate orders for post-discharge needs.    Review of Systems  Review of Systems   Constitutional:  Positive for malaise/fatigue. Negative for chills, fever and weight loss.   HENT:  Negative for hearing loss and tinnitus.    Eyes:  Negative for blurred vision, double vision, photophobia and pain.   Respiratory:  Negative for cough, sputum production and shortness of breath.    Cardiovascular:  Negative for chest pain, palpitations, orthopnea and leg swelling.   Gastrointestinal:  Negative for abdominal pain, constipation, diarrhea, nausea and vomiting.   Genitourinary:  Negative for dysuria, frequency and urgency.   Musculoskeletal:  Negative for back pain, joint pain, myalgias and neck pain.   Skin:  Negative for rash.   Neurological:  Negative for dizziness, tingling, tremors, sensory change, speech change, focal weakness and headaches.   Psychiatric/Behavioral:  Negative for hallucinations and substance abuse.    All other systems reviewed and are negative.       Physical Exam  Temp:  [35.7 °C (96.2 °F)-36.6 °C (97.8 °F)] 36.1 °C (96.9 °F)  Pulse:  [51-96] 64  Resp:  [16-20] 20  BP: ()/(53-96) 118/72  SpO2:  [90 %-98 %] 97 %    Physical Exam  Vitals reviewed.   Constitutional:       General: He is not in acute distress.     Appearance: He is obese.   HENT:      Head: Normocephalic and atraumatic. No contusion.      Right Ear: External ear normal.      Left Ear: External ear normal.      Nose: Nose normal.      Mouth/Throat:      Pharynx: No oropharyngeal exudate.   Eyes:      General:         Right eye: No discharge.         Left eye: No discharge.      Pupils: Pupils are equal, round, and reactive to light.   Cardiovascular:      Rate and Rhythm: Normal rate and regular  rhythm.      Heart sounds: No murmur heard.     No friction rub. No gallop.   Pulmonary:      Effort: Pulmonary effort is normal.      Breath sounds: No wheezing or rhonchi.   Abdominal:      General: Bowel sounds are normal. There is no distension.      Palpations: Abdomen is soft.      Tenderness: There is no abdominal tenderness. There is no rebound.      Comments: No right upper quadrant tenderness   Musculoskeletal:         General: No swelling or tenderness. Normal range of motion.      Cervical back: No rigidity. No muscular tenderness.   Skin:     General: Skin is warm and dry.      Coloration: Skin is not jaundiced.   Neurological:      General: No focal deficit present.      Mental Status: He is alert and oriented to person, place, and time.      Cranial Nerves: No cranial nerve deficit.      Sensory: No sensory deficit.      Comments: He is following commands and moving all his extremities   Psychiatric:         Mood and Affect: Mood normal.         Fluids    Intake/Output Summary (Last 24 hours) at 5/12/2024 1746  Last data filed at 5/12/2024 1000  Gross per 24 hour   Intake 450 ml   Output 325 ml   Net 125 ml       Laboratory  Recent Labs     05/10/24  2250 05/11/24  0845 05/12/24  0347   WBC 6.4 2.8* 5.7   RBC 5.75 4.77 4.67*   HEMOGLOBIN 16.8 13.8* 13.5*   HEMATOCRIT 46.8 39.6* 38.9*   MCV 81.4 83.0 83.3   MCH 29.2 28.9 28.9   MCHC 35.9 34.8 34.7   RDW 40.4 41.8 43.1   PLATELETCT 162* 130* 158*   MPV 9.8 10.2 10.1     Recent Labs     05/10/24  2250 05/11/24  0845 05/12/24  0347   SODIUM 132* 131* 133*   POTASSIUM 3.7 4.4 4.6   CHLORIDE 95* 101 102   CO2 21 18* 19*   GLUCOSE 142* 241* 252*   BUN 21 22 27*   CREATININE 1.67* 1.32 1.17   CALCIUM 8.6 7.4* 7.8*     Recent Labs     05/10/24  2250 05/12/24  0347   INR 1.51* 2.58*               Imaging  US-RUQ   Final Result      1.  Negative for gallstones or biliary dilation      2.  Hepatic steatosis and hepatomegaly      CT-ABDOMEN-PELVIS WITH   Final  Result      1.  No bowel obstruction or perforation.   2.  Normal appendix.   3.  Nonobstructing RIGHT kidney stone.   4.  Vague areas of decreased enhancement in the lower pole RIGHT kidney posteriorly and upper pole LEFT kidney anteriorly possibly indicating focal nephritis.  Mass is difficult to entirely exclude.  Follow-up recommended.      DX-CHEST-PORTABLE (1 VIEW)   Final Result      No acute cardiopulmonary disease.           Assessment/Plan  * Sepsis with acute renal failure and septic shock, due to unspecified organism, unspecified acute renal failure type (HCC)- (present on admission)  Assessment & Plan  This is severe sepsis Present on admission  SIRS criteria identified on my evaluation include: Fever, with temperature greater than 100.9 deg F, Tachycardia, with heart rate greater than 90 BPM, and Tachypnea, with respirations greater than 20 per minute  Clinical indicators of end organ dysfunction include Hypotension with systolic blood pressure less than 90 or MAP less than 65 and Lactic Acid greater than 2  Indicators of septic shock include: Sepsis present and persistent hypotension despite fluid resuscitation   Sources is: UTI vs undetermined  Sepsis protocol initiated  Crystalloid Fluid Administration: Fluid resuscitation ordered per standard protocol - 30 mL/kg per current or ideal body weight  IV antibiotics as appropriate for source of sepsis  Reassessment: I have reassessed the patient's hemodynamic status  I ordered HIV testing that came back negative.  I ordered gonorrhea and chlamydia screening that came back negative  Continue antibiotics  Follow-up on culture results.      Lactic acidosis  Assessment & Plan  LA 3.9 to 2.6   Likely secondary to GI losses, poor PO intake and in setting of severe sepsis  IVF resuscitation    ABHIJIT (acute kidney injury) (HCC)  Assessment & Plan  BUN 21, Cr 1.67 (baseline 1.0)  Likely secondary to hypovolemia from GI losses and poor PO intake  Renal function has  been improving.  Similar lab workup for tomorrow.    Hypomagnesemia  Assessment & Plan  1.3, repleted  F/u with repeat Mg level    Shock liver  Assessment & Plan  AST 1370, ALT 1987, , T bili 5.4  Could be secondary to severe sepsis, autoimmune hepatitis vs less likely viral, drugs/toxin, metabolic  Patient denies any abdominal exam. H/o NAFLD.   Acetaminophen level negative  IVF resuscitation  F/u Mac's, ASMA, AMA  I reviewed ultrasound right upper quadrant that did not show obstruction or gallstones.  Acute hepatitis panel came back negative.  Liver enzymes has been trending down.  I ordered CMP for tomorrow.    Antiphospholipid syndrome (HCC)- (present on admission)  Assessment & Plan  C/b CVA 2019  Continue warfarin    Cerebrovascular accident (CVA) due to vascular occlusion (HCC)- (present on admission)  Assessment & Plan  H/o, 2019. 2/2 antiphospholipid syndrome.    JAYLIN (obstructive sleep apnea)- (present on admission)  Assessment & Plan  CPAP prn and at night  RT protocol  Pulm referral for sleep apnea testing ordered for outpatient    Type 2 diabetes mellitus without complication, without long-term current use of insulin (HCC)- (present on admission)  Assessment & Plan  I am continuing sliding scale with hypoglycemia protocol.    Morbid obesity (HCC)- (present on admission)  Assessment & Plan  Nutrition consulted  Outpatient follow up    Hypertension- (present on admission)  Assessment & Plan  Home antihypertensives held in setting of severe sepsis      I discussed plan of care during multidisciplinary rounds regarding patient's current medical condition and plan of care.      >51 minutes total time spent in records review, lab/radiology assessment, patient history and assessment, and directing plan of care with high level medical decision making complexity. See orders.        VTE prophylaxis:    therapeutic anticoagulation with coumadin dosing per pharmacy, adjust PRN      I have performed a  physical exam and reviewed and updated ROS and Plan today (5/12/2024). In review of yesterday's note (5/11/2024), there are no changes except as documented above.

## 2024-05-13 NOTE — PROGRESS NOTES
Patient is being discharged home from the discharge lounge. Patient is A&Ox4. IV removed on unit. Discharge instructions provided to patient and reviewed. Patient verbalized understanding and all questions and concerns were addressed. Patient waiting for meds to beds in the lobby.

## 2024-05-14 ENCOUNTER — TELEPHONE (OUTPATIENT)
Dept: VASCULAR LAB | Facility: MEDICAL CENTER | Age: 39
End: 2024-05-14
Payer: COMMERCIAL

## 2024-05-14 NOTE — TELEPHONE ENCOUNTER
[facility]     Received anticoagulation referral from hospital team on 05/13/2024.     Pt is not new to warfarin nor our clinic. He was discharged in 2022 d/t out of network insurance and warfarin was managed by PCP    Called patient to schedule an appt to establish care. No answer. LVM.    1st call     Insurance: [employer]  PCP: External  Locations to be seen: [facility]    If no response by 06/13/2024 OR 2 no shows/cancellations, will remove from referral list    Eloisa Mcghee, PharmD  [facility]  Phone [phone]

## 2024-05-15 LAB — SMA IGG SER-ACNC: 14 UNITS (ref 0–19)

## 2024-05-16 ENCOUNTER — TELEPHONE (OUTPATIENT)
Dept: VASCULAR LAB | Facility: MEDICAL CENTER | Age: 39
End: 2024-05-16
Payer: COMMERCIAL

## 2024-05-16 LAB
BACTERIA BLD CULT: NORMAL
BACTERIA BLD CULT: NORMAL
COLLECT DURATION TIME SPEC: NORMAL HRS
COPPER 24H UR-MRATE: NORMAL UG/D (ref 3–45)
COPPER ?TM UR-MCNC: NORMAL UG/DL
COPPER/CREAT 24H UR: NORMAL UG/G CRT (ref 10–45)
CREAT 24H UR-MCNC: 137 MG/DL
SIGNIFICANT IND 70042: NORMAL
SIGNIFICANT IND 70042: NORMAL
SITE SITE: NORMAL
SITE SITE: NORMAL
SOURCE SOURCE: NORMAL
SOURCE SOURCE: NORMAL
SPECIMEN VOL ?TM UR: NORMAL ML

## 2024-05-16 NOTE — TELEPHONE ENCOUNTER
[facility]     Received anticoagulation referral from hospital team on 05/13/2024.     Pt is not new to warfarin nor our clinic. He was discharged in 2022 d/t out of network insurance and warfarin was managed by PCP     Called pt - he confirmed another provider is managing his warfarin.  Will close referral     2nd call     Insurance: [employer]  PCP: External  Locations to be seen: [facility]     If no response by 06/13/2024 OR 2 no shows/cancellations, will remove from referral list     Eloisa Mcghee, PharmD  [facility]armac[facility]  Phone [phone]

## 2024-05-17 ENCOUNTER — TELEPHONE (OUTPATIENT)
Dept: WOUND CARE | Facility: MEDICAL CENTER | Age: 39
End: 2024-05-17
Payer: COMMERCIAL

## 2024-05-17 NOTE — TELEPHONE ENCOUNTER
Received message from patient stating he would like to confirm appointment for Monday 5/21/24 at 1300, as last week patient was admitted to the hospital and he wanted to make sure he could still come in. Verified that patient did have a new referral in place and spoke with Isabela Burnett, Practice , who stated that patient was ok to come in as far as authorization went and changed appointment to a new eval. This RN called the patient to inform him that he can come to appointment on Monday at 1300. Discussed with patient the option to come to appointments possibly monthly after this next appointment as long as things are looking ok. This RN informed patient that he could discuss that with the RN that he sees on Monday, but is a definite option and he is in charge of his care.

## 2024-05-20 ENCOUNTER — NON-PROVIDER VISIT (OUTPATIENT)
Dept: WOUND CARE | Facility: MEDICAL CENTER | Age: 39
End: 2024-05-20
Attending: NURSE PRACTITIONER
Payer: COMMERCIAL

## 2024-05-20 RX ORDER — ONDANSETRON 4 MG/1
4 TABLET, ORALLY DISINTEGRATING ORAL EVERY 6 HOURS PRN
COMMUNITY
Start: 2024-05-10

## 2024-05-20 NOTE — PATIENT INSTRUCTIONS
-Keep your wound clean and dry.    -Should you experience any significant changes in your wound(s), such as infection (redness, swelling, localized heat, increased pain, fever > 101 F, chills) or have any questions regarding your home care instructions, please contact the wound center at [phone]. If after hours, contact your primary care physician or go to the hospital emergency room.

## 2024-05-21 NOTE — CERTIFICATION
Non Provider Encounter- Full Thickness Wound        HISTORY OF PRESENT ILLNESS  Wound History:               START OF CARE IN CLINIC: 5/6/24               REFERRING PROVIDER: SABINA Eisenberg              WOUND- Full Thickness Wound              LOCATION: Left posterolateral lower extremity, right lateral ankle              HISTORY: Pt referred for nonhealing wound to left lower leg since February of this year, was seen in clinic for one visit then recently hospitalized 5/10-5/13 for sepsis and elevated liver enzymes. Pt reports he had a small scab in the area, unsure of etiology, then accidentally scratched it when itching leg. Pt has antiphospholipid syndrome, Diabetes with a recent A1C of 7.5, and smokes a quarter to half a pack of cigarettes daily. Pt reports he has tried vaseline and neosporin with bandages, but says they made pain and drainage much worse. Has been applying betadine daily since first wound clinic appt 5/6/24 and had it applied during hospitalization. Pt also presents with new scab he is concerned about to right lateral ankle he just noticed today; he is unsure of how it occurred.      Assessment 5/20/24: Pt's left leg wound presents as dry, thick, firm stable eschar. There is no drainage or fluctuance. Edges are well attached. Minimal Blanchable erythema around wound likely chronic, no heat or localized edema noted. Right lateral ankle displays small stable nondraining, nontender scab. Pt does not yet have follow up with GI consultants regarding elevated liver enzymes. Referral appears authorized, provided phone number for pt to call & schedule. Pt also still needs toschedule PCP follow up. Pt asking if he can take tylenol for pain, however cautioned against due to elevated liver enzymes and recommended follow up with PCP.         Pertinent Medical History: Diabetes type 2, Antiphospholipid syndrome, tobacco use,  CVA, HTN      DIABETES HX: Currently managing with Glucovance.  Checks  blood sugars 1-2 times daily and reports that these typically run around 70s-150s.  Has had previous diabetes education. Does check feet routinely.      TOBACCO USE: 3-10 cigarettes a day     Pertinent Labs and Diagnostics:     Labs:      **Per Referral, pt's most recent A1C is 7.5**    A1c:         Lab Results   Component Value Date/Time     HBA1C 8.4 (H) 09/28/2021 10:21 AM            IMAGING: None     LAST  WOUND CULTURE:  None            Patient allergies and medications reviewed via Epic.      WOUND ASSESSMENT-                Procedures:  No procedures performed.                   Wound 05/06/24 Leg Lateral Left (Active)   Wound Image   05/20/24 1300   Site Assessment Eschar;Brown;Dry 05/20/24 1300   Periwound Assessment Edema 05/20/24 1300   Margins Attached edges 05/20/24 1300   Drainage Amount None 05/20/24 1300   Treatments Cleansed 05/20/24 1300   Wound Cleansing Povidone-Iodine 05/20/24 1300   Periwound Protectant Not Applicable 05/20/24 1300   Dressing Cleansing/Solutions Other (Comments) 05/20/24 1300   Dressing Options Open to Air 05/20/24 1300   Dressing Change/Treatment Frequency Daily, and As Needed 05/20/24 1300   Wound Team Following Monthly 05/20/24 1300   Wound Length (cm) 2.5 cm 05/06/24 1300   Wound Width (cm) 2.3 cm 05/06/24 1300   Wound Surface Area (cm^2) 5.75 cm^2 05/06/24 1300   Wound Odor None 05/20/24 1300   Exposed Structures None 05/20/24 1300       Wound 05/20/24 Ankle Lateral Right (Active)   Wound Image   05/20/24 1300   Site Assessment Scabbed 05/20/24 1300   Periwound Assessment Purple 05/20/24 1300   Margins Attached edges 05/20/24 1300   Drainage Amount None 05/20/24 1300   Treatments Cleansed 05/20/24 1300   Wound Cleansing Povidone-Iodine 05/20/24 1300   Periwound Protectant Not Applicable 05/20/24 1300   Dressing Cleansing/Solutions Other (Comments) 05/20/24 1300   Dressing Options Open to Air 05/20/24 1300   Wound Team Following Monthly 05/20/24 1300   Tunneling (cm) 0 cm  05/20/24 1300   Undermining (cm) 0 cm 05/20/24 1300   Wound Odor None 05/20/24 1300   Exposed Structures None 05/20/24 1300       PATIENT EDUCATION  - Importance of tight glucose control for wound healing   - Implications of loss of protective sensation (LOPS) discussed with patient- including increased risk for amputation.  - Advised to check feet at least daily, moisturize feet, and to always wear protective foot wear.   -Seek medical care if you experience signs of infection such as redness, periwound heat, new or increased pain, fever, chills.  WOUND CARE:  - Left leg Wound is a stable, well attached, nondraining eschar and right ankle wound is stable, well attached, nondraining scab. Apply povodine iodine to wounds daily and allow to dry.

## 2024-06-17 ENCOUNTER — OFFICE VISIT (OUTPATIENT)
Dept: WOUND CARE | Facility: MEDICAL CENTER | Age: 39
End: 2024-06-17
Attending: NURSE PRACTITIONER
Payer: COMMERCIAL

## 2024-06-17 DIAGNOSIS — Z91.199 NO-SHOW FOR APPOINTMENT: ICD-10-CM

## 2024-06-17 PROCEDURE — 99999 PR NO CHARGE: CPT | Performed by: STUDENT IN AN ORGANIZED HEALTH CARE EDUCATION/TRAINING PROGRAM

## 2024-06-17 NOTE — PROGRESS NOTES
No-Call, No Show    Patient was a no-call, no-show for today's wound clinic appointment.     Called and spoke with patient, he reports that he tried calling but was unable to get through to clinic. His car is not functioning and is currently in the shop.     Confirmed patient's next scheduled appointment for 6/25 @ 10:30AM.

## 2024-06-24 ENCOUNTER — TELEPHONE (OUTPATIENT)
Dept: HEALTH INFORMATION MANAGEMENT | Facility: OTHER | Age: 39
End: 2024-06-24
Payer: COMMERCIAL

## 2024-06-24 ENCOUNTER — TELEPHONE (OUTPATIENT)
Dept: WOUND CARE | Facility: MEDICAL CENTER | Age: 39
End: 2024-06-24
Payer: COMMERCIAL

## 2024-06-24 NOTE — TELEPHONE ENCOUNTER
"Patient called clinic to confirm appointment tomorrow, Tuesday, 6/25 @ 10:30AM.     Patient reports that \"one of the scabs came off\" and that he is having a lot of pain in the side of his leg. He has been taking Tylenol, but is concerned about the pain.   "

## 2024-06-25 ENCOUNTER — OFFICE VISIT (OUTPATIENT)
Dept: WOUND CARE | Facility: MEDICAL CENTER | Age: 39
End: 2024-06-25
Attending: NURSE PRACTITIONER
Payer: COMMERCIAL

## 2024-06-25 VITALS
OXYGEN SATURATION: 94 % | HEART RATE: 96 BPM | TEMPERATURE: 96.9 F | SYSTOLIC BLOOD PRESSURE: 115 MMHG | RESPIRATION RATE: 18 BRPM | DIASTOLIC BLOOD PRESSURE: 92 MMHG

## 2024-06-25 DIAGNOSIS — D68.61 ANTIPHOSPHOLIPID SYNDROME (HCC): ICD-10-CM

## 2024-06-25 DIAGNOSIS — R52 PAIN ASSOCIATED WITH WOUND: ICD-10-CM

## 2024-06-25 DIAGNOSIS — T14.8XXA PAIN ASSOCIATED WITH WOUND: ICD-10-CM

## 2024-06-25 DIAGNOSIS — E11.9 TYPE 2 DIABETES MELLITUS WITHOUT COMPLICATION, WITHOUT LONG-TERM CURRENT USE OF INSULIN (HCC): ICD-10-CM

## 2024-06-25 DIAGNOSIS — L97.818 NON-PRESSURE CHRONIC ULCER OF OTHER PART OF RIGHT LOWER LEG WITH OTHER SPECIFIED SEVERITY (HCC): ICD-10-CM

## 2024-06-25 DIAGNOSIS — I87.2 VENOUS INSUFFICIENCY OF BOTH LOWER EXTREMITIES: ICD-10-CM

## 2024-06-25 DIAGNOSIS — L97.929: ICD-10-CM

## 2024-06-25 DIAGNOSIS — F17.200 NICOTINE DEPENDENCE WITH CURRENT USE: ICD-10-CM

## 2024-06-25 PROCEDURE — 99214 OFFICE O/P EST MOD 30 MIN: CPT | Mod: 25 | Performed by: NURSE PRACTITIONER

## 2024-06-25 PROCEDURE — 11042 DBRDMT SUBQ TIS 1ST 20SQCM/<: CPT | Performed by: NURSE PRACTITIONER

## 2024-06-25 PROCEDURE — 11045 DBRDMT SUBQ TISS EACH ADDL: CPT | Performed by: NURSE PRACTITIONER

## 2024-06-25 PROCEDURE — 99406 BEHAV CHNG SMOKING 3-10 MIN: CPT | Performed by: NURSE PRACTITIONER

## 2024-06-25 PROCEDURE — 99214 OFFICE O/P EST MOD 30 MIN: CPT

## 2024-06-25 PROCEDURE — 3080F DIAST BP >= 90 MM HG: CPT | Performed by: NURSE PRACTITIONER

## 2024-06-25 PROCEDURE — 11042 DBRDMT SUBQ TIS 1ST 20SQCM/<: CPT

## 2024-06-25 PROCEDURE — 3074F SYST BP LT 130 MM HG: CPT | Performed by: NURSE PRACTITIONER

## 2024-06-25 NOTE — PROGRESS NOTES
Provider Encounter- Lower Extremity Ulcer      HISTORY OF PRESENT ILLNESS  Wound History:    START OF CARE IN CLINIC: 5/6/2024    REFERRING PROVIDER:   Alistair MARLOW     WOUND ETIOLOGY: venous   LOCATION: L posterior lateral calf     Right lateral calf     HISTORY: Patient developed an ulcer to his left posterior lateral calf in February 2024.  Denies any injury.  Does not know how the ulcer started.  He also developed an ulcer to his right lateral calf.  Does not recall when or how this ulcer started.  Per chart review, ulcer was noted at his wound care appointment on 5/20/2024 that patient reported was new that day.  He has been performing  self wound care for several months including Vaseline, Neosporin with Band-Aids until he followed up with his PCP and was referred to wound care clinic treatment.  The ulcer formed an eschar.  Approximately 6/23/2024, the eschar fell off, causing increased pain and tenderness to patient's leg.  Since the ulcer has occurred, patient has received a prescription for Cipro in beginning of May 2024.  He was admitted from 5/11/2024 - 5/13/2024 for sepsis and elevated liver enzymes.  Received IV antibiotics and discharged home on Augmentin and doxycycline.  Patient completed antibiotics.      Pertinent Medical History: Acute kidney injury, antiphospholipid syndrome, JAYLIN, CVA, morbid obesity, hypertension, nonalcoholic fatty liver disease, type 2 diabetes without long-term insulin   ETIOLOGY HISTORY: Diagnosed: Unknown. Vascular Surgeon: None. Previous vascular procedures none. Compression none. Varicose Veins: Yes  Diagnosed with diabetes greater than 1 year.  Patient does not recall when he was diagnosed eccentrically.  Controls diabetes with oral diabetic medications.  Checks blood sugars daily.  Averages around 130s to 160s.  Denies any numbness or tingling to feet.  Checks feet occasionally.  Does not have orthotics.      TOBACCO USE: Half pack per day    Patient's problem  list, allergies, and current medications reviewed and updated in Epic    Interval History:  6/25/2024 initial provider Clinic visit with Sally MARLOW, FNP-BC, CWON, [facility].  Pt denies fevers, chills, nausea, vomiting.  Blood sugar 130 today.  Reports no sleep for the last 2 days secondary to pain from his left calf.        REVIEW OF SYSTEMS:   Review of Systems   Constitutional:  Negative for chills, fever and weight loss.   HENT: Negative.     Eyes: Negative.    Respiratory:  Negative for cough, shortness of breath and wheezing.    Cardiovascular:  Positive for leg swelling. Negative for chest pain and palpitations.   Gastrointestinal:  Negative for diarrhea, nausea and vomiting.   Genitourinary: Negative.    Musculoskeletal:  Negative for falls and myalgias.   Skin:  Negative for itching.        Right ankle wound, left calf wound   Neurological:  Negative for dizziness, sensory change, weakness and headaches.   Psychiatric/Behavioral:  Negative for depression. The patient is not nervous/anxious.          PHYSICAL EXAMINATION:   BP (!) 115/92 Comment: RN notified  Pulse 96   Temp 36.1 °C (96.9 °F) (Temporal)   Resp 18   SpO2 94%     Physical Exam  Vitals reviewed.   Cardiovascular:      Rate and Rhythm: Normal rate.      Pulses: Normal pulses.      Comments: Right palpable DP and PT.  With Doppler biphasic tones noted to PT/DP  Faintly palpable left DP.  Absent PT brisk tones noted to PT/DP  Pulmonary:      Effort: Pulmonary effort is normal.   Abdominal:      Palpations: Abdomen is soft.   Musculoskeletal:      Comments: mycotic toenails  +2 nonpitting edema BLE   Skin:     General: Skin is warm and dry.      Comments: Right lateral calf: Eschar, stable, intact, no fluctuance, no evidence of infection  Left lateral posterior calf: Adherent slough, full-thickness, no evidence of infection   Neurological:      Mental Status: He is alert and oriented to person, place, and time.      Comments: With  monofilament sensed 10/10 to both feet   Psychiatric:         Mood and Affect: Affect normal.         Cognition and Memory: Memory normal.     Monofilament testing with a 10 gram force: sensation intact: intact bilaterally  Visual Inspection: Feet without maceration, ulcers, fissures.  Pedal pulses: intact bilaterally      WOUND ASSESSMENT  Wound 05/06/24 Full Thickness Wound Leg Left --Left Posterolateral Lower Leg (Active)   Wound Image    06/25/24 1042   Site Assessment Red;Yellow 06/25/24 1042   Periwound Assessment Edema 06/25/24 1042   Margins Attached edges 06/25/24 1042   Drainage Amount Moderate 06/25/24 1042   Drainage Description Serosanguineous 06/25/24 1042   Treatments Cleansed;Topical Lidocaine;Provider debridement 06/25/24 1042   Wound Cleansing Hypochlorus Acid 06/25/24 1042   Periwound Protectant No-sting Skin Prep 06/25/24 1042   Dressing Cleansing/Solutions Not Applicable 06/25/24 1042   Dressing Options Hydrofiber Silver;Silicone Adhesive Foam 06/25/24 1042   Dressing Change/Treatment Frequency Monday, Wednesday, Friday, and As Needed 06/25/24 1042   Wound Team Following Monthly 05/20/24 1300   Non-staged Wound Description Full thickness 06/25/24 1042   Wound Length (cm) 4.6 cm 06/25/24 1042   Wound Width (cm) 4.5 cm 06/25/24 1042   Wound Depth (cm) 0.1 cm 06/25/24 1042   Wound Surface Area (cm^2) 20.7 cm^2 06/25/24 1042   Wound Volume (cm^3) 2.07 cm^3 06/25/24 1042   Post-Procedure Length (cm) 4.6 cm 06/25/24 1042   Post-Procedure Width (cm) 4.5 cm 06/25/24 1042   Post-Procedure Depth (cm) 0.2 cm 06/25/24 1042   Post-Procedure Surface Area (cm^2) 20.7 cm^2 06/25/24 1042   Post-Procedure Volume (cm^3) 4.14 cm^3 06/25/24 1042   Tunneling (cm) 0 cm 06/25/24 1042   Undermining (cm) 0 cm 06/25/24 1042   Wound Odor None 06/25/24 1042   Pulses Doppler 06/25/24 1042   Exposed Structures None 06/25/24 1042   Number of days: 52       Wound 05/20/24 Other (comment) Leg Lateral Right --Right Lateral Lower  Leg (Active)   Wound Image   06/25/24 1100   Site Assessment Dry;Black;Eschar 06/25/24 1100   Periwound Assessment Scar tissue 06/25/24 1100   Margins Attached edges 06/25/24 1100   Drainage Amount None 06/25/24 1100   Treatments Cleansed 05/20/24 1300   Wound Cleansing Povidone-Iodine 05/20/24 1300   Periwound Protectant Not Applicable 06/25/24 1100   Dressing Cleansing/Solutions Not Applicable 06/25/24 1100   Dressing Options Open to Air 06/25/24 1100   Wound Team Following Monthly 05/20/24 1300   Non-staged Wound Description Not applicable 06/25/24 1100   Tunneling (cm) 0 cm 05/20/24 1300   Undermining (cm) 0 cm 05/20/24 1300   Wound Odor None 06/25/24 1100   Pulses Doppler 06/25/24 1100   Exposed Structures LEYLA 06/25/24 1100   Number of days: 38           PROCEDURE: Debridement of left leg ulcer  -2% viscous lidocaine applied topically to wound bed for approximately 5 minutes prior to debridement  -Curette used to excise nonviable tissue from wound bed.  Excisional debridement was performed to remove devitalized tissue until healthy, bleeding tissue was visualized.   Entire surface of wound, 20.7cm2 debrided.  Tissue debrided into the subcutaneous layer.    -Bleeding controlled with manual pressure.   -Wound care completed by wound RN, refer to wound flowsheet   -Patient tolerated the procedure well, without c/o of significant pain or discomfort.       Pertinent Labs and Diagnostics:    Labs:   A1c:   Lab Results   Component Value Date/Time    HBA1C 8.4 (H) 09/28/2021 10:21 AM            IMAGING: None    VASCULAR STUDIES:   No results found.    LAST  WOUND CULTURE:  DATE :    Lab Results   Component Value Date/Time    CULTRSULT  05/10/2024 11:10 PM     Usual urogenital portillo >100,000 cfu/mL  3 or more organisms isolated, culture of doubtful  significance, please recollect.                ASSESSMENT AND PLAN:     1. Chronic ulcer of left lower leg (HCC)  2. Non-pressure chronic ulcer of other part of right lower  leg with other specified severity (HCC)  Left leg ulcer started per patient February 2024.  Right leg ulcer noted by patient mid May 2024    6/25/2024: Adherent slough to left leg ulcer.  Adherent eschar to right leg ulcer  - Excisional debridement performed today.  Medically necessary to promote wound healing.  -Patient to follow-up weekly for wound care appointments  - Patient to change dressing 1-2 times in between clinic appointments  - Supplies ordered    Wound care: Hydrofiber silver, adhesive probe    3. Type 2 diabetes mellitus without complication, without long-term current use of insulin ([facility])  6/25/2024: Blood sugar 130 today.  Continue to keep blood sugars less than 140 for improved wound healing  - Patient sensate to both feet.  Sense 10 out of 10 with monofilament  - Discussed follow-up with podiatrist for routine foot nail care as his toenails are thick and mycotic.  Patient declined referral to podiatry    4. Venous insufficiency of both lower extremities    6/25/2024: Complicating factor.  Recommend Tubigrip.  Patient declined.  etiology of  venous stasis ulceration discussed with patient  - Importance of managing edema for healing of ulcer, and for prevention of new ulcer development  -Need for lifelong compression of lower legs   -Elevate legs above the level of the heart periodically throughout the day.  - Importance of adequate nutrition for wound healing    5. Antiphospholipid syndrome (HCC)    6/25/2024: Complicating factor.  Patient on Coumadin.  Follows with Coumadin clinic monthly.  Checks INR himself.    6. Pain associated with wound    6/25/2024: Increased pain since eschar fell off 2 days ago per patient.  Patient reports he will be getting labs done at [facility] today to assess renal and liver function for PCP.  - Patient to follow-up with PCP regarding pain medications pending lab results    7. Nicotine dependence with current use    6/25/2024: Tobacco cessation education provided for  more than 4 minutes, discussed options of nicotine patch, medical treatment with wellbutrin and chantix. Discussed the risks of smoking including increased risk of heart disease, stroke, cancer and COPD. Discussed the benefits of quitting smoking on wound healing and circulation system.  Patient is not interested in quitting at this time.              PATIENT EDUCATION  - Etiology of  venous stasis ulceration discussed with patient  - Importance of managing edema for healing of ulcer, and for prevention of new ulcer development  -Need for lifelong compression of lower legs   -Elevate legs above the level of the heart periodically throughout the day.  - Importance of adequate nutrition for wound healing  -Advised to go to ER for any increased redness, swelling, drainage or odor, or if patient develops fever, chills, nausea or vomiting.      My total time spent caring for the patient on the day of the encounter was 30 minutes.   This does not include time spent on separately billable procedures/tests.       Please note that this note may have been created using voice recognition software. I have worked with technical experts from [facility] to optimize the interface.  I have made every reasonable attempt to correct obvious errors, but there may be errors of grammar and possibly     N

## 2024-06-25 NOTE — PATIENT INSTRUCTIONS
-Keep dressings clean and dry. Change dressings every 3-4 days, and if they become over saturated, soiled or fall off.     -If you need to change your dressings at home: Wash your wound with normal saline, wound cleanser, or unscented soap and water prior to applying your new dressings. Please avoid cleansing with hydrogen peroxide or rubbing alcohol. Hydrogen peroxide and rubbing alcohol are toxic to new tissue and skin cells.    -Avoid prolonged standing or sitting without elevating your legs.    -Remove your compression garments if you have severe pain, severe swelling, numbness, color change, or temperature change in your toes. If you need to remove your compression garments, do so by unrolling them. Do not cut the compression garments off, this is to prevent cutting yourself on accident.    -Should you experience any significant changes in your wounds, such as signs of infection (increasing redness, swelling, localized heat, increased pain, fever > 101 F, chills) or have any questions regarding your home care instructions, please contact the wound center at [phone]. If after hours, contact your primary care physician or go to the hospital emergency room.     -If you are 5 or more minutes late for an appointment, we reserve the right to cancel and reschedule that appointment. Additionally, if you are habitually late or not showing (3 late cancellations and/or no shows), we reserve the right to cancel your remaining appointments and it will be your responsibility to obtain a new referral if services are still needed.

## 2024-06-27 ASSESSMENT — ENCOUNTER SYMPTOMS
NERVOUS/ANXIOUS: 0
VOMITING: 0
WHEEZING: 0
DEPRESSION: 0
DIZZINESS: 0
COUGH: 0
FALLS: 0
SENSORY CHANGE: 0
DIARRHEA: 0
NAUSEA: 0
PALPITATIONS: 0
CHILLS: 0
WEAKNESS: 0
FEVER: 0
WEIGHT LOSS: 0
MYALGIAS: 0
EYES NEGATIVE: 1
SHORTNESS OF BREATH: 0
HEADACHES: 0

## 2024-07-08 ENCOUNTER — NON-PROVIDER VISIT (OUTPATIENT)
Dept: WOUND CARE | Facility: MEDICAL CENTER | Age: 39
End: 2024-07-08
Attending: NURSE PRACTITIONER
Payer: COMMERCIAL

## 2024-07-08 PROCEDURE — 97602 WOUND(S) CARE NON-SELECTIVE: CPT

## 2024-07-16 ENCOUNTER — NON-PROVIDER VISIT (OUTPATIENT)
Dept: WOUND CARE | Facility: MEDICAL CENTER | Age: 39
End: 2024-07-16
Attending: NURSE PRACTITIONER
Payer: COMMERCIAL

## 2024-07-22 ENCOUNTER — OFFICE VISIT (OUTPATIENT)
Dept: WOUND CARE | Facility: MEDICAL CENTER | Age: 39
End: 2024-07-22
Attending: NURSE PRACTITIONER
Payer: COMMERCIAL

## 2024-07-22 VITALS
RESPIRATION RATE: 18 BRPM | HEART RATE: 90 BPM | TEMPERATURE: 96.9 F | DIASTOLIC BLOOD PRESSURE: 84 MMHG | SYSTOLIC BLOOD PRESSURE: 113 MMHG | OXYGEN SATURATION: 97 %

## 2024-07-22 DIAGNOSIS — I87.2 VENOUS INSUFFICIENCY OF BOTH LOWER EXTREMITIES: ICD-10-CM

## 2024-07-22 DIAGNOSIS — L97.929: ICD-10-CM

## 2024-07-22 DIAGNOSIS — E11.9 TYPE 2 DIABETES MELLITUS WITHOUT COMPLICATION, WITHOUT LONG-TERM CURRENT USE OF INSULIN (HCC): ICD-10-CM

## 2024-07-22 DIAGNOSIS — I83.813 VARICOSE VEINS OF BILATERAL LOWER EXTREMITIES WITH PAIN: ICD-10-CM

## 2024-07-22 DIAGNOSIS — R52 PAIN ASSOCIATED WITH WOUND: ICD-10-CM

## 2024-07-22 DIAGNOSIS — T14.8XXA PAIN ASSOCIATED WITH WOUND: ICD-10-CM

## 2024-07-22 DIAGNOSIS — D68.61 ANTIPHOSPHOLIPID SYNDROME (HCC): ICD-10-CM

## 2024-07-22 DIAGNOSIS — L97.818 NON-PRESSURE CHRONIC ULCER OF OTHER PART OF RIGHT LOWER LEG WITH OTHER SPECIFIED SEVERITY (HCC): ICD-10-CM

## 2024-07-22 DIAGNOSIS — F17.200 NICOTINE DEPENDENCE WITH CURRENT USE: ICD-10-CM

## 2024-07-22 PROCEDURE — 99214 OFFICE O/P EST MOD 30 MIN: CPT | Performed by: STUDENT IN AN ORGANIZED HEALTH CARE EDUCATION/TRAINING PROGRAM

## 2024-07-22 PROCEDURE — 3079F DIAST BP 80-89 MM HG: CPT | Performed by: STUDENT IN AN ORGANIZED HEALTH CARE EDUCATION/TRAINING PROGRAM

## 2024-07-22 PROCEDURE — 3074F SYST BP LT 130 MM HG: CPT | Performed by: STUDENT IN AN ORGANIZED HEALTH CARE EDUCATION/TRAINING PROGRAM

## 2024-07-22 PROCEDURE — 99214 OFFICE O/P EST MOD 30 MIN: CPT

## 2024-07-29 ENCOUNTER — OFFICE VISIT (OUTPATIENT)
Dept: WOUND CARE | Facility: MEDICAL CENTER | Age: 39
End: 2024-07-29
Attending: NURSE PRACTITIONER
Payer: COMMERCIAL

## 2024-07-29 VITALS
RESPIRATION RATE: 18 BRPM | SYSTOLIC BLOOD PRESSURE: 108 MMHG | TEMPERATURE: 97.9 F | DIASTOLIC BLOOD PRESSURE: 83 MMHG | OXYGEN SATURATION: 99 % | HEART RATE: 110 BPM

## 2024-07-29 DIAGNOSIS — L97.929: ICD-10-CM

## 2024-07-29 DIAGNOSIS — F17.200 NICOTINE DEPENDENCE WITH CURRENT USE: ICD-10-CM

## 2024-07-29 DIAGNOSIS — D68.61 ANTIPHOSPHOLIPID SYNDROME (HCC): ICD-10-CM

## 2024-07-29 DIAGNOSIS — E11.9 TYPE 2 DIABETES MELLITUS WITHOUT COMPLICATION, WITHOUT LONG-TERM CURRENT USE OF INSULIN (HCC): ICD-10-CM

## 2024-07-29 DIAGNOSIS — R52 PAIN ASSOCIATED WITH WOUND: ICD-10-CM

## 2024-07-29 DIAGNOSIS — T14.8XXA PAIN ASSOCIATED WITH WOUND: ICD-10-CM

## 2024-07-29 DIAGNOSIS — I87.2 VENOUS INSUFFICIENCY OF BOTH LOWER EXTREMITIES: ICD-10-CM

## 2024-07-29 DIAGNOSIS — I83.813 VARICOSE VEINS OF BILATERAL LOWER EXTREMITIES WITH PAIN: ICD-10-CM

## 2024-07-29 DIAGNOSIS — L97.818 NON-PRESSURE CHRONIC ULCER OF OTHER PART OF RIGHT LOWER LEG WITH OTHER SPECIFIED SEVERITY (HCC): ICD-10-CM

## 2024-07-29 PROCEDURE — 11042 DBRDMT SUBQ TIS 1ST 20SQCM/<: CPT

## 2024-07-29 PROCEDURE — 99213 OFFICE O/P EST LOW 20 MIN: CPT

## 2024-08-06 ENCOUNTER — OFFICE VISIT (OUTPATIENT)
Dept: WOUND CARE | Facility: MEDICAL CENTER | Age: 39
End: 2024-08-06
Attending: NURSE PRACTITIONER
Payer: COMMERCIAL

## 2024-08-06 VITALS
SYSTOLIC BLOOD PRESSURE: 130 MMHG | RESPIRATION RATE: 20 BRPM | HEART RATE: 101 BPM | DIASTOLIC BLOOD PRESSURE: 80 MMHG | TEMPERATURE: 97.1 F

## 2024-08-06 DIAGNOSIS — D68.61 ANTIPHOSPHOLIPID SYNDROME (HCC): ICD-10-CM

## 2024-08-06 DIAGNOSIS — R52 PAIN ASSOCIATED WITH WOUND: ICD-10-CM

## 2024-08-06 DIAGNOSIS — I83.813 VARICOSE VEINS OF BILATERAL LOWER EXTREMITIES WITH PAIN: ICD-10-CM

## 2024-08-06 DIAGNOSIS — L97.929: ICD-10-CM

## 2024-08-06 DIAGNOSIS — L97.818 NON-PRESSURE CHRONIC ULCER OF OTHER PART OF RIGHT LOWER LEG WITH OTHER SPECIFIED SEVERITY (HCC): ICD-10-CM

## 2024-08-06 DIAGNOSIS — E11.9 TYPE 2 DIABETES MELLITUS WITHOUT COMPLICATION, WITHOUT LONG-TERM CURRENT USE OF INSULIN (HCC): ICD-10-CM

## 2024-08-06 DIAGNOSIS — I87.2 VENOUS INSUFFICIENCY OF BOTH LOWER EXTREMITIES: ICD-10-CM

## 2024-08-06 DIAGNOSIS — T14.8XXA PAIN ASSOCIATED WITH WOUND: ICD-10-CM

## 2024-08-06 DIAGNOSIS — F17.200 NICOTINE DEPENDENCE WITH CURRENT USE: ICD-10-CM

## 2024-08-06 PROCEDURE — 11042 DBRDMT SUBQ TIS 1ST 20SQCM/<: CPT

## 2024-08-06 PROCEDURE — 3075F SYST BP GE 130 - 139MM HG: CPT | Performed by: STUDENT IN AN ORGANIZED HEALTH CARE EDUCATION/TRAINING PROGRAM

## 2024-08-06 PROCEDURE — 11042 DBRDMT SUBQ TIS 1ST 20SQCM/<: CPT | Performed by: STUDENT IN AN ORGANIZED HEALTH CARE EDUCATION/TRAINING PROGRAM

## 2024-08-06 PROCEDURE — 3079F DIAST BP 80-89 MM HG: CPT | Performed by: STUDENT IN AN ORGANIZED HEALTH CARE EDUCATION/TRAINING PROGRAM

## 2024-08-07 NOTE — PROGRESS NOTES
Provider Encounter- Lower Extremity Ulcer      HISTORY OF PRESENT ILLNESS  Wound History:    START OF CARE IN CLINIC: 5/6/2024    REFERRING PROVIDER:   Alistair MARLOW     WOUND ETIOLOGY: venous   LOCATION: L posterior lateral calf     Right lateral calf     HISTORY: Patient developed an ulcer to his left posterior lateral calf in February 2024.  Denies any injury.  Does not know how the ulcer started.  He also developed an ulcer to his right lateral calf.  Does not recall when or how this ulcer started.  Per chart review, ulcer was noted at his wound care appointment on 5/20/2024 that patient reported was new that day.  He has been performing  self wound care for several months including Vaseline, Neosporin with Band-Aids until he followed up with his PCP and was referred to wound care clinic treatment.  The ulcer formed an eschar.  Approximately 6/23/2024, the eschar fell off, causing increased pain and tenderness to patient's leg.  Since the ulcer has occurred, patient has received a prescription for Cipro in beginning of May 2024.  He was admitted from 5/11/2024 - 5/13/2024 for sepsis and elevated liver enzymes.  Received IV antibiotics and discharged home on Augmentin and doxycycline.  Patient completed antibiotics.      Pertinent Medical History: Acute kidney injury, antiphospholipid syndrome, JAYLIN, CVA, morbid obesity, hypertension, nonalcoholic fatty liver disease, type 2 diabetes without long-term insulin   ETIOLOGY HISTORY: Diagnosed: Unknown. Vascular Surgeon: None. Previous vascular procedures none. Compression none. Varicose Veins: Yes  Diagnosed with diabetes greater than 1 year.  Patient does not recall when he was diagnosed eccentrically.  Controls diabetes with oral diabetic medications.  Checks blood sugars daily.  Averages around 130s to 160s.  Denies any numbness or tingling to feet.  Checks feet occasionally.  Does not have orthotics.      TOBACCO USE: Half pack per day    Patient's problem  list, allergies, and current medications reviewed and updated in Epic    Interval History:  6/25/2024 initial provider Clinic visit with Sally MARLOW, FNP-BC, CWON, CFDAVID.  Pt denies fevers, chills, nausea, vomiting.  Blood sugar 130 today.  Reports no sleep for the last 2 days secondary to pain from his left calf.    7/22/2024: Clinic visit with Doug Bond MD. Patient reports frustration over chronic pain and continuance of wounds. His wounds have not made improvement. He reports that he did not tolerate debridement last week. He has areas of painful varicose veins medial aspect of leg. He was counseled extensively on suspected venous insufficiency, etiology of disease, and options for treatment. We discussed importance of compression and that pain associated with compression use should improve as edema improves. We also discussed the possibility of being evaluated for venous ablation, he would like to pursue if candidate. Will place referral to [facility] vein clinic.    7/29/2024: Clinic visit with Doug Bond MD. Patient reports doing ok. Reports pain improved with wearing compression. One of the previously stable eschars to left posterior leg fell off this week. We discussed increasing compression today. We discussed Unna boot, he declined using and would prefer to wear compression socks. Measured and given information on purchasing compression socks. Recommend he bring compression socks to next clinic visit. He has not received appointment from [facility] vein clinic, given number to make appointment.    8/6/2024: Clinic visit with Doug Bond MD. Patient reports feeling ok. Eschar right leg fell off few days ago. Appears to be starting new small satellite wound left posterior leg. He reports that he has appointment with [facility] on 9/9. Patient reports that compression socks have been ordered, but have not been delivered yet. He would like to avoid multilayer wrap and use tubigrip until  compression socks are delivered.    REVIEW OF SYSTEMS:   Unchanged from previous wound clinic assessment on 7/29/2024, except as noted in interval history above    PHYSICAL EXAMINATION:   /80   Pulse (!) 101   Temp 36.2 °C (97.1 °F)   Resp 20     Physical Exam  Vitals reviewed.   Cardiovascular:      Rate and Rhythm: Normal rate.      Pulses: Normal pulses.      Comments: Right palpable DP and PT.  With Doppler biphasic tones noted to PT/DP  Faintly palpable left DP.  Absent PT brisk tones noted to PT/DP  Pulmonary:      Effort: Pulmonary effort is normal.   Abdominal:      Palpations: Abdomen is soft.   Musculoskeletal:      Comments: mycotic toenails  +2 nonpitting edema BLE   Skin:     General: Skin is warm and dry.      Comments: Right lateral calf: Eschar removed this week, wound with thin layer of slough and some granulation buds forming. No evidence of infection.    Left lateral posterior calf wounds: Wounds measure about the same, thin layer of slough with some buds of granulation tissue. New satellite wound starting to form. No evidence of infection.   Neurological:      Mental Status: He is alert and oriented to person, place, and time.      Comments: With monofilament sensed 10/10 to both feet   Psychiatric:         Mood and Affect: Affect normal.         Cognition and Memory: Memory normal.     Monofilament testing with a 10 gram force: sensation intact: intact bilaterally  Visual Inspection: Feet without maceration, ulcers, fissures.  Pedal pulses: intact bilaterally      WOUND ASSESSMENT  Wound 05/06/24 Full Thickness Wound Left Posterolateral Lower Leg (Active)   Wound Image    08/06/24 1724   Site Assessment Red;Granulation tissue;Yellow 08/06/24 1724   Periwound Assessment Hemosiderin Staining;Edema;Fragile;Painful 08/06/24 1724   Margins Attached edges 08/06/24 1724   Drainage Amount Moderate 08/06/24 1724   Drainage Description Serosanguineous 08/06/24 1724   Treatments Cleansed;Topical  Lidocaine;Provider debridement;Site care 08/06/24 1724   Wound Cleansing Hypochlorus Acid 08/06/24 1724   Periwound Protectant No-sting Skin Prep 08/06/24 1724   Dressing Status Old drainage 07/29/24 1500   Dressing Changed Changed 07/29/24 1500   Dressing Cleansing/Solutions Not Applicable 08/06/24 1724   Dressing Options Hydrofiber Silver;Silicone Adhesive Foam;Tubigrip 08/06/24 1724   Dressing Change/Treatment Frequency Weekly, and As Needed 08/06/24 1724   Wound Team Following Weekly 07/29/24 1500   Non-staged Wound Description Full thickness 08/06/24 1724   Wound Length (cm) 1 cm 08/06/24 1724   Wound Width (cm) 2.1 cm 08/06/24 1724   Wound Depth (cm) 0.1 cm 08/06/24 1724   Wound Surface Area (cm^2) 2.1 cm^2 08/06/24 1724   Wound Volume (cm^3) 0.21 cm^3 08/06/24 1724   Post-Procedure Length (cm) 1 cm 08/06/24 1724   Post-Procedure Width (cm) 2.2 cm 08/06/24 1724   Post-Procedure Depth (cm) 0.1 cm 08/06/24 1724   Post-Procedure Surface Area (cm^2) 2.2 cm^2 08/06/24 1724   Post-Procedure Volume (cm^3) 0.22 cm^3 08/06/24 1724   Wound Healing % 90 08/06/24 1724   Tunneling (cm) 0 cm 08/06/24 1724   Undermining (cm) 0 cm 08/06/24 1724   Wound Odor None 08/06/24 1724   Pulses Doppler 06/25/24 1042   Exposed Structures None 08/06/24 1724   Number of days: 92       Wound 05/20/24 Full Thickness Wound --Right Anterolateral Lower Leg (Active)   Wound Image    08/06/24 1724   Site Assessment Red;Yellow 08/06/24 1724   Periwound Assessment Edema;Fragile;Painful 08/06/24 1724   Margins Attached edges 08/06/24 1724   Drainage Amount Moderate 08/06/24 1724   Drainage Description Serosanguineous 08/06/24 1724   Treatments Cleansed;Topical Lidocaine;Provider debridement;Site care 08/06/24 1724   Wound Cleansing Hypochlorus Acid 08/06/24 1724   Periwound Protectant No-sting Skin Prep 08/06/24 1724   Dressing Cleansing/Solutions Not Applicable 08/06/24 1724   Dressing Options Hydrofiber Silver;Silicone Adhesive Foam;Tubigrip  08/06/24 1724   Dressing Change/Treatment Frequency Weekly, and As Needed 08/06/24 1724   Wound Team Following Monthly 05/20/24 1300   Non-staged Wound Description Full thickness 08/06/24 1724   Wound Length (cm) 1.5 cm 08/06/24 1724   Wound Width (cm) 1.2 cm 08/06/24 1724   Wound Depth (cm) 0.1 cm 08/06/24 1724   Wound Surface Area (cm^2) 1.8 cm^2 08/06/24 1724   Wound Volume (cm^3) 0.18 cm^3 08/06/24 1724   Post-Procedure Length (cm) 1.5 cm 08/06/24 1724   Post-Procedure Width (cm) 1.3 cm 08/06/24 1724   Post-Procedure Depth (cm) 0.1 cm 08/06/24 1724   Post-Procedure Surface Area (cm^2) 1.95 cm^2 08/06/24 1724   Post-Procedure Volume (cm^3) 0.195 cm^3 08/06/24 1724   Tunneling (cm) 0 cm 08/06/24 1724   Undermining (cm) 0 cm 08/06/24 1724   Wound Odor None 08/06/24 1724   Pulses Doppler 06/25/24 1100   Exposed Structures None 08/06/24 1724   Number of days: 78       Wound 07/29/24 Full Thickness Wound Left posterior LE (Active)   Wound Image    08/06/24 1724   Site Assessment Red;Granulation tissue;Yellow;Slough 08/06/24 1724   Periwound Assessment Hemosiderin Staining;Edema;Fragile;Painful 08/06/24 1724   Margins Attached edges 08/06/24 1724   Closure Secondary intention 07/29/24 1500   Drainage Amount Moderate 08/06/24 1724   Drainage Description Serosanguineous 08/06/24 1724   Treatments Cleansed;Topical Lidocaine;Provider debridement;Site care 08/06/24 1724   Wound Cleansing Hypochlorus Acid 08/06/24 1724   Periwound Protectant No-sting Skin Prep 08/06/24 1724   Dressing Status Old drainage 07/29/24 1500   Dressing Changed New 07/29/24 1500   Dressing Cleansing/Solutions Not Applicable 08/06/24 1724   Dressing Options Hydrofiber Silver;Silicone Adhesive Foam;Tubigrip 08/06/24 1724   Dressing Change/Treatment Frequency Weekly, and As Needed 08/06/24 1724   Wound Team Following Weekly 07/29/24 1500   Non-staged Wound Description Full thickness 08/06/24 1724   Wound Length (cm) 3 cm 08/06/24 1724   Wound Width  (cm) 3.5 cm 08/06/24 1724   Wound Depth (cm) 0.1 cm 08/06/24 1724   Wound Surface Area (cm^2) 10.5 cm^2 08/06/24 1724   Wound Volume (cm^3) 1.05 cm^3 08/06/24 1724   Post-Procedure Length (cm) 3.1 cm 08/06/24 1724   Post-Procedure Width (cm) 3.5 cm 08/06/24 1724   Post-Procedure Depth (cm) 0.1 cm 08/06/24 1724   Post-Procedure Surface Area (cm^2) 10.85 cm^2 08/06/24 1724   Post-Procedure Volume (cm^3) 1.085 cm^3 08/06/24 1724   Wound Healing % -133 08/06/24 1724   Tunneling (cm) 0 cm 08/06/24 1724   Undermining (cm) 0 cm 08/06/24 1724   Wound Odor None 08/06/24 1724   Exposed Structures None 08/06/24 1724   Number of days: 8     PROCEDURE: Excisional debridement of left posterior leg wounds and right lower extremity wound.  -2% viscous lidocaine applied topically to wound bed for approximately 5 minutes prior to debridement  -Curette used to debride wound bed.  Excisional debridement was performed to remove devitalized tissue until healthy, bleeding tissue was visualized.   Entire surface of wound, 15 cm2 debrided.  Tissue debrided into the subcutaneous layer.    -Bleeding controlled with manual pressure.    -Wound care completed by wound RN, refer to flowsheet  -Patient tolerated the procedure well, without c/o pain or discomfort.    Pertinent Labs and Diagnostics:    Labs:   A1c:   Lab Results   Component Value Date/Time    HBA1C 8.4 (H) 09/28/2021 10:21 AM            IMAGING: None    VASCULAR STUDIES:   No results found.    LAST  WOUND CULTURE:  DATE :    Lab Results   Component Value Date/Time    CULTRSULT  05/10/2024 11:10 PM     Usual urogenital portillo >100,000 cfu/mL  3 or more organisms isolated, culture of doubtful  significance, please recollect.                ASSESSMENT AND PLAN:     1. Chronic ulcer of left lower leg (HCC)  2. Non-pressure chronic ulcer of other part of right lower leg with other specified severity (HCC)  Left leg ulcer started per patient February 2024.  Right leg ulcer noted by patient  mid May 2024    8/6/2024: Right leg wound eschar fell off this week, new satellite wound forming left lower extremity and cluster is measuring larger.  - Excisional debridement was performed in clinic, medically necessary to promote wound healing.  - Patient tolerated tubigrip with reduction in pain and slight improvement in Edema. He has ordered compression socks, but have not arrived yet. He declines wraps and will only allow tubigrips until compression socks arrive.  - Patient to follow-up weekly for wound care appointments  - Patient to change dressing 1-2 times in between clinic appointments    Wound care: Hydrofiber silver, Silicone foam, Tubigrip compression x 2    3. Type 2 diabetes mellitus without complication, without long-term current use of insulin ([facility])    8/6/2024:   - Continue to keep blood sugars less than 140 for improved wound healing  - Patient sensate to both feet.  Sense 10 out of 10 with monofilament  - Discussed follow-up with podiatrist for routine foot nail care as his toenails are thick and mycotic.  Patient declined referral to podiatry    4. Venous insufficiency of both lower extremities  5. Varicose Veins of bilateral lower extremities with pain    8/6/2024:   - Patient has been resistant to wearing compression due to discomfort in past.  - Previously counseled extensively on etiology of disease and importance of compression.  - Patient has ordered compression socks, have not been delivered yet. Encouraged use when he has obtained.  - Patient has painful varicose veins medial leg that are not associated with wound. Discussed that venous ablation may help wound healing and chronic lower extremity pain. He was referred to [facility] vein clinic, reports appointment 9/9  -Likely need for lifelong compression of lower legs   -Elevate legs above the level of the heart periodically throughout the day.    5. Antiphospholipid syndrome (HCC)    8/6/2024: Complicating factor.  Patient on Coumadin.   Follows with Coumadin clinic monthly.  Checks INR himself.    6. Pain associated with wound    8/6/2024:   - Patient to follow-up with PCP regarding pain medications    7. Nicotine dependence with current use    8/6/2024:  - Patient previously counseled on cessation and effects of nicotine on wound healing    PATIENT EDUCATION  - Etiology of  venous stasis ulceration discussed with patient  - Importance of managing edema for healing of ulcer, and for prevention of new ulcer development  -Need for lifelong compression of lower legs   -Elevate legs above the level of the heart periodically throughout the day.  - Importance of adequate nutrition for wound healing  -Advised to go to ER for any increased redness, swelling, drainage or odor, or if patient develops fever, chills, nausea or vomiting.    Please note that this note may have been created using voice recognition software. I have worked with technical experts from [employer] to optimize the interface.  I have made every reasonable attempt to correct obvious errors, but there may be errors of grammar and possibly     N

## 2024-08-12 ENCOUNTER — NON-PROVIDER VISIT (OUTPATIENT)
Dept: WOUND CARE | Facility: MEDICAL CENTER | Age: 39
End: 2024-08-12
Attending: NURSE PRACTITIONER
Payer: COMMERCIAL

## 2024-08-12 PROCEDURE — 97602 WOUND(S) CARE NON-SELECTIVE: CPT

## 2024-08-12 NOTE — PATIENT INSTRUCTIONS
Avoid prolonged standing or sitting without elevating your legs.  - Apply tubigrip to your legs ending 2 fingers below back of knee without wrinkles.     Should you experience any significant changes in your wound(s), such as infection (redness, swelling, localized heat, increased pain, fever > 101 F, chills) or have any questions regarding your home care instructions, please contact the wound center at [phone]. If after hours, contact your primary care physician or go to the hospital emergency room.   Keep dressing clean, dry and covered while bathing. Only change dressing if it becomes over saturated, soiled or falls off.

## 2024-08-19 ENCOUNTER — NON-PROVIDER VISIT (OUTPATIENT)
Dept: WOUND CARE | Facility: MEDICAL CENTER | Age: 39
End: 2024-08-19
Attending: NURSE PRACTITIONER
Payer: COMMERCIAL

## 2024-08-19 PROCEDURE — 97602 WOUND(S) CARE NON-SELECTIVE: CPT

## 2024-08-19 NOTE — PATIENT INSTRUCTIONS
Avoid prolonged standing or sitting without elevating your legs.  - Apply tubigrip to your legs ending 2 fingers below back of knee without wrinkles.   - Knee-high gradient compression to legs  - Remove if temperature or sensation changes.   If compression needs to be removed, un-wrap it do not cut it off.     Should you experience any significant changes in your wound(s), such as infection (redness, swelling, localized heat, increased pain, fever > 101 F, chills) or have any questions regarding your home care instructions, please contact the wound center at [phone]. If after hours, contact your primary care physician or go to the hospital emergency room.   Keep dressing clean, dry and covered while bathing. Only change dressing if it becomes over saturated, soiled or falls off.

## 2024-08-19 NOTE — PROGRESS NOTES
Non-selective debridement with moist gauze after application of topical lidocaine to LLE & RLE wounds to remove non-viable slough. Wound are very sensitive for patient and unable to tolerate much debridement or touching of perirwound & wound area.   Patient reports his compressions stockings have not arrived yet & will call regarding their shipment status.

## 2024-08-26 ENCOUNTER — OFFICE VISIT (OUTPATIENT)
Dept: WOUND CARE | Facility: MEDICAL CENTER | Age: 39
End: 2024-08-26
Attending: NURSE PRACTITIONER
Payer: COMMERCIAL

## 2024-08-26 VITALS
SYSTOLIC BLOOD PRESSURE: 106 MMHG | HEART RATE: 99 BPM | DIASTOLIC BLOOD PRESSURE: 84 MMHG | TEMPERATURE: 97.2 F | RESPIRATION RATE: 18 BRPM | OXYGEN SATURATION: 100 %

## 2024-08-26 DIAGNOSIS — L97.929: ICD-10-CM

## 2024-08-26 DIAGNOSIS — R52 PAIN ASSOCIATED WITH WOUND: ICD-10-CM

## 2024-08-26 DIAGNOSIS — L97.818 NON-PRESSURE CHRONIC ULCER OF OTHER PART OF RIGHT LOWER LEG WITH OTHER SPECIFIED SEVERITY (HCC): ICD-10-CM

## 2024-08-26 DIAGNOSIS — I87.2 VENOUS INSUFFICIENCY OF BOTH LOWER EXTREMITIES: ICD-10-CM

## 2024-08-26 DIAGNOSIS — E11.9 TYPE 2 DIABETES MELLITUS WITHOUT COMPLICATION, WITHOUT LONG-TERM CURRENT USE OF INSULIN (HCC): ICD-10-CM

## 2024-08-26 DIAGNOSIS — D68.61 ANTIPHOSPHOLIPID SYNDROME (HCC): ICD-10-CM

## 2024-08-26 DIAGNOSIS — T14.8XXA PAIN ASSOCIATED WITH WOUND: ICD-10-CM

## 2024-08-26 DIAGNOSIS — F17.200 NICOTINE DEPENDENCE WITH CURRENT USE: ICD-10-CM

## 2024-08-26 DIAGNOSIS — I83.813 VARICOSE VEINS OF BILATERAL LOWER EXTREMITIES WITH PAIN: ICD-10-CM

## 2024-08-26 PROCEDURE — 99213 OFFICE O/P EST LOW 20 MIN: CPT

## 2024-08-26 PROCEDURE — 3074F SYST BP LT 130 MM HG: CPT | Performed by: STUDENT IN AN ORGANIZED HEALTH CARE EDUCATION/TRAINING PROGRAM

## 2024-08-26 PROCEDURE — 11042 DBRDMT SUBQ TIS 1ST 20SQCM/<: CPT | Performed by: STUDENT IN AN ORGANIZED HEALTH CARE EDUCATION/TRAINING PROGRAM

## 2024-08-26 PROCEDURE — 97597 DBRDMT OPN WND 1ST 20 CM/<: CPT

## 2024-08-26 PROCEDURE — 3079F DIAST BP 80-89 MM HG: CPT | Performed by: STUDENT IN AN ORGANIZED HEALTH CARE EDUCATION/TRAINING PROGRAM

## 2024-08-27 NOTE — PROGRESS NOTES
Provider Encounter- Lower Extremity Ulcer      HISTORY OF PRESENT ILLNESS  Wound History:    START OF CARE IN CLINIC: 5/6/2024    REFERRING PROVIDER:   Alistair MARLOW     WOUND ETIOLOGY: venous   LOCATION: L posterior lateral calf     Right lateral calf     HISTORY: Patient developed an ulcer to his left posterior lateral calf in February 2024.  Denies any injury.  Does not know how the ulcer started.  He also developed an ulcer to his right lateral calf.  Does not recall when or how this ulcer started.  Per chart review, ulcer was noted at his wound care appointment on 5/20/2024 that patient reported was new that day.  He has been performing  self wound care for several months including Vaseline, Neosporin with Band-Aids until he followed up with his PCP and was referred to wound care clinic treatment.  The ulcer formed an eschar.  Approximately 6/23/2024, the eschar fell off, causing increased pain and tenderness to patient's leg.  Since the ulcer has occurred, patient has received a prescription for Cipro in beginning of May 2024.  He was admitted from 5/11/2024 - 5/13/2024 for sepsis and elevated liver enzymes.  Received IV antibiotics and discharged home on Augmentin and doxycycline.  Patient completed antibiotics.      Pertinent Medical History: Acute kidney injury, antiphospholipid syndrome, JAYLIN, CVA, morbid obesity, hypertension, nonalcoholic fatty liver disease, type 2 diabetes without long-term insulin   ETIOLOGY HISTORY: Diagnosed: Unknown. Vascular Surgeon: None. Previous vascular procedures none. Compression none. Varicose Veins: Yes  Diagnosed with diabetes greater than 1 year.  Patient does not recall when he was diagnosed eccentrically.  Controls diabetes with oral diabetic medications.  Checks blood sugars daily.  Averages around 130s to 160s.  Denies any numbness or tingling to feet.  Checks feet occasionally.  Does not have orthotics.      TOBACCO USE: Half pack per day    Patient's problem  list, allergies, and current medications reviewed and updated in Epic    Interval History:  6/25/2024 initial provider Clinic visit with Sally MARLOW, FNP-BC, CWON, CFDAVID.  Pt denies fevers, chills, nausea, vomiting.  Blood sugar 130 today.  Reports no sleep for the last 2 days secondary to pain from his left calf.    7/22/2024: Clinic visit with Doug Bond MD. Patient reports frustration over chronic pain and continuance of wounds. His wounds have not made improvement. He reports that he did not tolerate debridement last week. He has areas of painful varicose veins medial aspect of leg. He was counseled extensively on suspected venous insufficiency, etiology of disease, and options for treatment. We discussed importance of compression and that pain associated with compression use should improve as edema improves. We also discussed the possibility of being evaluated for venous ablation, he would like to pursue if candidate. Will place referral to [facility].    7/29/2024: Clinic visit with Doug Bond MD. Patient reports doing ok. Reports pain improved with wearing compression. One of the previously stable eschars to left posterior leg fell off this week. We discussed increasing compression today. We discussed Unna boot, he declined using and would prefer to wear compression socks. Measured and given information on purchasing compression socks. Recommend he bring compression socks to next clinic visit. He has not received appointment from [facility] vein clinic, given number to make appointment.    8/6/2024: Clinic visit with Doug Bond MD. Patient reports feeling ok. Eschar right leg fell off few days ago. Appears to be starting new small satellite wound left posterior leg. He reports that he has appointment with [facility] on 9/9. Patient reports that compression socks have been ordered, but have not been delivered yet. He would like to avoid multilayer wrap and use tubigrip until  compression socks are delivered.    8/26/2024: Clinic visit with Doug Bond MD. Patient reports doing ok. He denies any acute issue. Slightly increased drainage this week. He ordered compression socks, he never received and possibly stolen after delivery. Given information for [facility], which I believe carry compression socks. Patient does not want multilayer wrap. Presented without compression today, reports that he has been wearing every day but did not wear for appointment today. Patient will try to cut down smoking.    REVIEW OF SYSTEMS:   Unchanged from previous wound clinic assessment on 8/6/2024, except as noted in interval history above    PHYSICAL EXAMINATION:   /84   Pulse 99   Temp 36.2 °C (97.2 °F) (Temporal)   Resp 18   SpO2 100%     Physical Exam  Vitals reviewed.   Cardiovascular:      Rate and Rhythm: Normal rate.      Pulses: Normal pulses.      Comments: Right palpable DP and PT.  With Doppler biphasic tones noted to PT/DP  Faintly palpable left DP.  Absent PT brisk tones noted to PT/DP  Pulmonary:      Effort: Pulmonary effort is normal.   Abdominal:      Palpations: Abdomen is soft.   Musculoskeletal:      Comments: mycotic toenails  +2 nonpitting edema BLE   Skin:     General: Skin is warm and dry.      Comments: Right lateral calf: Wound largely unchanged. Some fibrotic tissue and slough at bed of wound. No evidence of infection.    Left lateral posterior calf wounds: Wounds measure about the same, increased drainage without evidence of infection. Continues to have some fibrotic slough / tissue.   Neurological:      Mental Status: He is alert and oriented to person, place, and time.      Comments: With monofilament sensed 10/10 to both feet   Psychiatric:         Mood and Affect: Affect normal.         Cognition and Memory: Memory normal.     Monofilament testing with a 10 gram force: sensation intact: intact bilaterally  Visual Inspection: Feet without  maceration, ulcers, fissures.  Pedal pulses: intact bilaterally      WOUND ASSESSMENT  Wound 05/06/24 Full Thickness Wound Left Posterolateral Lower Leg (Active)   Wound Image    08/26/24 1630   Site Assessment Red;Yellow 08/26/24 1630   Periwound Assessment Hemosiderin Staining;Edema 08/26/24 1630   Margins Attached edges 08/26/24 1630   Drainage Amount Large 08/26/24 1630   Drainage Description Serosanguineous 08/26/24 1630   Treatments Cleansed;Topical Lidocaine;Provider debridement;Site care 08/26/24 1630   Wound Cleansing Hypochlorus Acid 08/26/24 1630   Periwound Protectant No-sting Skin Prep 08/26/24 1630   Dressing Status Old drainage 07/29/24 1500   Dressing Changed New 08/26/24 1630   Dressing Cleansing/Solutions Not Applicable 08/26/24 1630   Dressing Options Hydrofiber Silver;Hydrofiber;Silicone Adhesive Foam;Other (Comments) 08/26/24 1630   Dressing Change/Treatment Frequency Weekly, and As Needed 08/26/24 1630   Wound Team Following Weekly 08/26/24 1630   Non-staged Wound Description Full thickness 08/26/24 1630   Wound Length (cm) 0.8 cm 08/26/24 1630   Wound Width (cm) 1.3 cm 08/26/24 1630   Wound Depth (cm) 0.1 cm 08/26/24 1630   Wound Surface Area (cm^2) 1.04 cm^2 08/26/24 1630   Wound Volume (cm^3) 0.104 cm^3 08/26/24 1630   Post-Procedure Length (cm) 0.8 cm 08/26/24 1630   Post-Procedure Width (cm) 1.4 cm 08/26/24 1630   Post-Procedure Depth (cm) 0.1 cm 08/26/24 1630   Post-Procedure Surface Area (cm^2) 1.12 cm^2 08/26/24 1630   Post-Procedure Volume (cm^3) 0.112 cm^3 08/26/24 1630   Wound Healing % 95 08/26/24 1630   Tunneling (cm) 0 cm 08/26/24 1630   Undermining (cm) 0 cm 08/26/24 1630   Wound Odor None 08/26/24 1630   Pulses Left;2+;DP;PT 08/19/24 1500   Exposed Structures None 08/26/24 1630   Number of days: 112       Wound 05/20/24 Full Thickness Wound --Right Anterolateral Lower Leg (Active)   Wound Image    08/26/24 1630   Site Assessment Red;Yellow 08/26/24 1630   Periwound Assessment  Hemosiderin Staining;Edema 08/26/24 1630   Margins Attached edges 08/26/24 1630   Drainage Amount Large 08/26/24 1630   Drainage Description Serosanguineous 08/26/24 1630   Treatments Cleansed;Topical Lidocaine;Provider debridement;Site care 08/26/24 1630   Wound Cleansing Hypochlorus Acid 08/26/24 1630   Periwound Protectant No-sting Skin Prep 08/26/24 1630   Dressing Cleansing/Solutions Not Applicable 08/26/24 1630   Dressing Options Hydrofiber Silver;Hydrofiber;Silicone Adhesive Foam;Other (Comments) 08/26/24 1630   Dressing Change/Treatment Frequency Weekly, and As Needed 08/26/24 1630   Wound Team Following Weekly 08/26/24 1630   Non-staged Wound Description Full thickness 08/26/24 1630   Wound Length (cm) 1.8 cm 08/26/24 1630   Wound Width (cm) 1.7 cm 08/26/24 1630   Wound Depth (cm) 0.1 cm 08/26/24 1630   Wound Surface Area (cm^2) 3.06 cm^2 08/26/24 1630   Wound Volume (cm^3) 0.306 cm^3 08/26/24 1630   Post-Procedure Length (cm) 1.9 cm 08/26/24 1630   Post-Procedure Width (cm) 1.7 cm 08/26/24 1630   Post-Procedure Depth (cm) 0.1 cm 08/26/24 1630   Post-Procedure Surface Area (cm^2) 3.23 cm^2 08/26/24 1630   Post-Procedure Volume (cm^3) 0.323 cm^3 08/26/24 1630   Wound Healing % -70 08/26/24 1630   Tunneling (cm) 0 cm 08/26/24 1630   Undermining (cm) 0 cm 08/26/24 1630   Wound Odor None 08/26/24 1630   Pulses Right;Left;2+;DP;PT 08/19/24 1500   Exposed Structures None 08/26/24 1630   Number of days: 98       Wound 07/29/24 Full Thickness Wound Left posterior LE (Active)   Wound Image    08/26/24 1630   Site Assessment Red;Yellow 08/26/24 1630   Periwound Assessment Hemosiderin Staining;Edema 08/26/24 1630   Margins Attached edges 08/26/24 1630   Closure Secondary intention 07/29/24 1500   Drainage Amount Large 08/26/24 1630   Drainage Description Serosanguineous 08/26/24 1630   Treatments Cleansed;Topical Lidocaine;Provider debridement;Site care 08/26/24 1630   Wound Cleansing Hypochlorus Acid 08/26/24 1630    Periwound Protectant No-sting Skin Prep 08/26/24 1630   Dressing Status Old drainage 07/29/24 1500   Dressing Changed New 08/26/24 1630   Dressing Cleansing/Solutions Not Applicable 08/26/24 1630   Dressing Options Hydrofiber Silver;Hydrofiber;Silicone Adhesive Foam;Other (Comments) 08/26/24 1630   Dressing Change/Treatment Frequency Weekly, and As Needed 08/26/24 1630   Wound Team Following Weekly 08/26/24 1630   Non-staged Wound Description Full thickness 08/26/24 1630   Wound Length (cm) 4 cm 08/26/24 1630   Wound Width (cm) 2.6 cm 08/26/24 1630   Wound Depth (cm) 0.1 cm 08/26/24 1630   Wound Surface Area (cm^2) 10.4 cm^2 08/26/24 1630   Wound Volume (cm^3) 1.04 cm^3 08/26/24 1630   Post-Procedure Length (cm) 4 cm 08/26/24 1630   Post-Procedure Width (cm) 2.7 cm 08/26/24 1630   Post-Procedure Depth (cm) 0.1 cm 08/26/24 1630   Post-Procedure Surface Area (cm^2) 10.8 cm^2 08/26/24 1630   Post-Procedure Volume (cm^3) 1.08 cm^3 08/26/24 1630   Wound Healing % -131 08/26/24 1630   Tunneling (cm) 0 cm 08/26/24 1630   Undermining (cm) 0 cm 08/26/24 1630   Wound Odor None 08/26/24 1630   Pulses Right;Left;2+;DP;PT 08/19/24 1500   Exposed Structures None 08/26/24 1630   Number of days: 28     PROCEDURE: Excisional debridement of left posterior leg wounds and right lower extremity wound.  -2% viscous lidocaine applied topically to wound bed for approximately 5 minutes prior to debridement  -Curette used to debride wound bed.  Excisional debridement was performed to remove devitalized tissue until healthy, bleeding tissue was visualized.   Entire surface of wound, 15.15 cm2 debrided.  Tissue debrided into the subcutaneous layer.    -Bleeding controlled with manual pressure.    -Wound care completed by wound RN, refer to flowsheet  -Patient tolerated the procedure well, without c/o pain or discomfort.    Pertinent Labs and Diagnostics:    Labs:   A1c:   Lab Results   Component Value Date/Time    HBA1C 8.4 (H) 09/28/2021  10:21 AM            IMAGING: None    VASCULAR STUDIES:   No results found.    LAST  WOUND CULTURE:  DATE :    Lab Results   Component Value Date/Time    CULTRSULT  05/10/2024 11:10 PM     Usual urogenital portillo >100,000 cfu/mL  3 or more organisms isolated, culture of doubtful  significance, please recollect.                ASSESSMENT AND PLAN:     1. Chronic ulcer of left lower leg ([facility])  2. Non-pressure chronic ulcer of other part of right lower leg with other specified severity ([facility])  Left leg ulcer started per patient February 2024.  Right leg ulcer noted by patient mid May 2024    8/26/2024: Wounds largely unchanged, due to inadequate compression.  - Excisional debridement was performed in clinic, medically necessary to promote wound healing.  - Patient is only interested in wearing tubigrips for compression, does not want multilayer wrap.  - He ordered compression socks but never received, my have been stolen from outside door. Given information on [facility] which I believe stocks compression socks  - Patient to follow-up weekly for wound care appointments  - Patient to change dressing 1-2 times in between clinic appointments    Wound care: Hydrofiber silver, Silicone foam, Tubigrip compression x 2    3. Type 2 diabetes mellitus without complication, without long-term current use of insulin ([facility])    8/26/2024:   - Continue to keep blood sugars less than 140 for improved wound healing  - Patient sensate to both feet.  Sense 10 out of 10 with monofilament  - Discussed follow-up with podiatrist for routine foot nail care as his toenails are thick and mycotic.  Patient declined referral to podiatry    4. Venous insufficiency of both lower extremities  5. Varicose Veins of bilateral lower extremities with pain    8/26/2024:   - Patient has been resistant to wearing compression due to discomfort in past.  - Previously counseled extensively on etiology of disease and importance of compression.  -  Patient has ordered compression socks, may have been stolen off porch.  - Recommend he obtain compression socks from [facility]   - Patient has painful varicose veins medial leg that are not associated with wound. Discussed that venous ablation may help wound healing and chronic lower extremity pain. He was referred to [facility] vein clinic, reports appointment 9/9  -Likely need for lifelong compression of lower legs   -Elevate legs above the level of the heart periodically throughout the day.    5. Antiphospholipid syndrome ([facility])    8/26/2024: Complicating factor.  Patient on Coumadin.  Follows with Coumadin clinic monthly.  Checks INR himself.    6. Pain associated with wound    8/26/2024:   - Patient to follow-up with PCP regarding pain medications    7. Nicotine dependence with current use    8/26/2024:  - Patient previously counseled on cessation and effects of nicotine on wound healing, he is trying to cut back.    PATIENT EDUCATION  - Etiology of  venous stasis ulceration discussed with patient  - Importance of managing edema for healing of ulcer, and for prevention of new ulcer development  -Need for lifelong compression of lower legs   -Elevate legs above the level of the heart periodically throughout the day.  - Importance of adequate nutrition for wound healing  -Advised to go to ER for any increased redness, swelling, drainage or odor, or if patient develops fever, chills, nausea or vomiting.    Please note that this note may have been created using voice recognition software. I have worked with technical experts from [employer] to optimize the interface.  I have made every reasonable attempt to correct obvious errors, but there may be errors of grammar and possibly     N

## 2024-08-27 NOTE — PATIENT INSTRUCTIONS
Avoid prolonged standing or sitting without elevating your legs.  - Apply tubigrip to your legs ending 2 fingers below back of knee without wrinkles.     Should you experience any significant changes in your wound(s), such as infection (redness, swelling, localized heat, increased pain, fever > 101 F, chills) or have any questions regarding your home care instructions, please contact the wound center at [phone]. If after hours, contact your primary care physician or go to the hospital emergency room.   Keep dressing clean, dry and covered while bathing. Only change dressing if it becomes over saturated, soiled or falls off.

## 2024-09-02 ENCOUNTER — NON-PROVIDER VISIT (OUTPATIENT)
Dept: WOUND CARE | Facility: MEDICAL CENTER | Age: 39
End: 2024-09-02
Attending: NURSE PRACTITIONER
Payer: COMMERCIAL

## 2024-09-02 PROCEDURE — 97597 DBRDMT OPN WND 1ST 20 CM/<: CPT

## 2024-09-02 NOTE — PATIENT INSTRUCTIONS
-Keep your wound dressing clean, dry, and intact. Only change dressing if it's over saturated, soiled or falls off.     -Change your dressing if it becomes soiled, soaked, or falls off.    -Should you experience any significant changes in your wound(s), such as infection (redness, swelling, localized heat, increased pain, fever > 101 F, chills) or have any questions regarding your home care instructions, please contact the wound center at [phone]. If after hours, contact your primary care physician or go to the hospital emergency room.     If you are admitted to any hospital, you will need a new referral to come back to the wound clinic and any scheduled appointments that you already have, may be cancelled.

## 2024-09-02 NOTE — PROGRESS NOTES
2% viscous lidocaine for ~5 minute dwell time. CSWD with curette, scissors and forceps to remove ~1 cm2 non viable tissue from left posterior LE distal wound bed. Patient overall tolerated well.

## 2024-09-09 ENCOUNTER — NON-PROVIDER VISIT (OUTPATIENT)
Dept: WOUND CARE | Facility: MEDICAL CENTER | Age: 39
End: 2024-09-09
Attending: NURSE PRACTITIONER
Payer: COMMERCIAL

## 2024-09-09 PROCEDURE — 97602 WOUND(S) CARE NON-SELECTIVE: CPT

## 2024-09-09 NOTE — PATIENT INSTRUCTIONS
Avoid prolonged standing or sitting without elevating your legs.  - Knee-high gradient compression to legs.     Should you experience any significant changes in your wound(s), such as infection (redness, swelling, localized heat, increased pain, fever > 101 F, chills) or have any questions regarding your home care instructions, please contact the wound center at [phone]. If after hours, contact your primary care physician or go to the hospital emergency room.   Keep dressing clean, dry and covered while bathing. Only change dressing if it becomes over saturated, soiled or falls off.

## 2024-09-09 NOTE — PROGRESS NOTES
Switched to using iodoflex to provide more moisture levels, debride wound to minimize pain with non-selective or sharp debridement in office.   Non-selective debridement with moist gauze to remove non-viable biofilm from wound bed. Patient states he will apply his compression stockings at home.

## 2024-09-16 ENCOUNTER — NON-PROVIDER VISIT (OUTPATIENT)
Dept: WOUND CARE | Facility: MEDICAL CENTER | Age: 39
End: 2024-09-16
Attending: NURSE PRACTITIONER
Payer: COMMERCIAL

## 2024-09-16 PROCEDURE — 97597 DBRDMT OPN WND 1ST 20 CM/<: CPT

## 2024-09-16 NOTE — PATIENT INSTRUCTIONS
-Keep your wound dressing clean, dry, and intact. Only change dressing if it's over saturated, soiled or falls off.     -Change your dressing if it becomes soiled, soaked, or falls off.    -Should you experience any significant changes in your wound(s), such as infection (redness, swelling, localized heat, increased pain, fever > 101 F, chills) or have any questions regarding your home care instructions, please contact the wound center at [phone]. If after hours, contact your primary care physician or go to the hospital emergency room.  If you are admitted to any hospital, you will need a new referral to come back to the wound clinic and any scheduled appointments that you already have, may be cancelled.

## 2024-09-23 ENCOUNTER — NON-PROVIDER VISIT (OUTPATIENT)
Dept: WOUND CARE | Facility: MEDICAL CENTER | Age: 39
End: 2024-09-23
Attending: NURSE PRACTITIONER
Payer: COMMERCIAL

## 2024-09-23 PROCEDURE — 97602 WOUND(S) CARE NON-SELECTIVE: CPT

## 2024-09-23 NOTE — PATIENT INSTRUCTIONS
-Keep your wound dressing clean, dry, and intact. Change dressing weekly or if it's over saturated, soiled or falls off.     -Change your dressing if it becomes soiled, soaked, or falls off.    -One wound was resolved this visit. This tissue will be fragile and will not regain its original tensile strength. Ensure wound is kept clean, dry, and moisturized.     -Should you experience any significant changes in your wound(s), such as infection (redness, swelling, localized heat, increased pain, fever > 101 F, chills) or have any questions regarding your home care instructions, please contact the wound center at [phone]. If after hours, contact your primary care physician or go to the hospital emergency room.    If you are admitted to any hospital, you will need a new referral to come back to the wound clinic and any scheduled appointments that you already have, may be cancelled.

## 2024-09-30 ENCOUNTER — NON-PROVIDER VISIT (OUTPATIENT)
Dept: WOUND CARE | Facility: MEDICAL CENTER | Age: 39
End: 2024-09-30
Attending: NURSE PRACTITIONER
Payer: COMMERCIAL

## 2024-09-30 PROCEDURE — 97597 DBRDMT OPN WND 1ST 20 CM/<: CPT

## 2024-09-30 NOTE — PROGRESS NOTES
To better reflect wound positions and evolvement, this RN renamed wounds to be more accurate. Please disregard original names on the rulers in pictures.     Lidocaine allowed to sit on wound bed for five minutes. Scalpel used to crosshatch area of adherent eschar to the left lower extremity distal wound bed. Nonselective debridement performed using saline-soaked gauze to right lateral lower leg and to left posterior proximal lower extremity wound to remove nonviable tissue and slough.

## 2024-09-30 NOTE — PATIENT INSTRUCTIONS
-Keep dressings clean and dry. Change dressings every 3-4 days, and if they become over saturated, soiled or fall off.     -If you need to change your dressings at home: Wash your wound with normal saline, wound cleanser, or unscented soap and water prior to applying your new dressings. Please avoid cleansing with hydrogen peroxide or rubbing alcohol. Hydrogen peroxide and rubbing alcohol are toxic to new tissue and skin cells.    -Avoid prolonged standing or sitting without elevating your legs.    -Remove your compression garments if you have severe pain, severe swelling, numbness, color change, or temperature change in your toes. If you need to remove your compression garments, do so by unrolling them. Do not cut the compression garments off, this is to prevent cutting yourself on accident.    -Should you experience any significant changes in your wound(s), such as signs of infection (increasing redness, swelling, localized heat, increased pain, fever > 101 F, chills) or have any questions regarding your home care instructions, please contact the wound center at [phone]. If after hours, contact your primary care physician or go to the hospital emergency room.     -If you are admitted to any hospital, you will need a new referral to come back to the wound clinic and any scheduled appointments that you already have, may be cancelled.     -If you are 5 or more minutes late for an appointment, we reserve the right to cancel and reschedule that appointment. Additionally, if you are habitually late or not showing (3 late cancellations and/or no shows), we reserve the right to cancel your remaining appointments and it will be your responsibility to obtain a new referral if services are still needed.

## 2024-10-07 ENCOUNTER — NON-PROVIDER VISIT (OUTPATIENT)
Dept: WOUND CARE | Facility: MEDICAL CENTER | Age: 39
End: 2024-10-07
Attending: NURSE PRACTITIONER
Payer: COMMERCIAL

## 2024-10-07 PROCEDURE — 97602 WOUND(S) CARE NON-SELECTIVE: CPT

## 2024-10-14 ENCOUNTER — OFFICE VISIT (OUTPATIENT)
Dept: WOUND CARE | Facility: MEDICAL CENTER | Age: 39
End: 2024-10-14
Attending: NURSE PRACTITIONER
Payer: COMMERCIAL

## 2024-10-14 VITALS
HEART RATE: 97 BPM | SYSTOLIC BLOOD PRESSURE: 112 MMHG | TEMPERATURE: 96.9 F | DIASTOLIC BLOOD PRESSURE: 84 MMHG | RESPIRATION RATE: 18 BRPM

## 2024-10-14 DIAGNOSIS — I83.813 VARICOSE VEINS OF BILATERAL LOWER EXTREMITIES WITH PAIN: ICD-10-CM

## 2024-10-14 DIAGNOSIS — T14.8XXA PAIN ASSOCIATED WITH WOUND: ICD-10-CM

## 2024-10-14 DIAGNOSIS — F17.200 NICOTINE DEPENDENCE WITH CURRENT USE: ICD-10-CM

## 2024-10-14 DIAGNOSIS — I87.2 VENOUS INSUFFICIENCY OF BOTH LOWER EXTREMITIES: ICD-10-CM

## 2024-10-14 DIAGNOSIS — D68.61 ANTIPHOSPHOLIPID SYNDROME (HCC): ICD-10-CM

## 2024-10-14 DIAGNOSIS — L97.818 NON-PRESSURE CHRONIC ULCER OF OTHER PART OF RIGHT LOWER LEG WITH OTHER SPECIFIED SEVERITY (HCC): ICD-10-CM

## 2024-10-14 DIAGNOSIS — E11.9 TYPE 2 DIABETES MELLITUS WITHOUT COMPLICATION, WITHOUT LONG-TERM CURRENT USE OF INSULIN (HCC): ICD-10-CM

## 2024-10-14 DIAGNOSIS — R52 PAIN ASSOCIATED WITH WOUND: ICD-10-CM

## 2024-10-14 DIAGNOSIS — L97.929: ICD-10-CM

## 2024-10-14 PROCEDURE — 11042 DBRDMT SUBQ TIS 1ST 20SQCM/<: CPT | Performed by: NURSE PRACTITIONER

## 2024-10-14 PROCEDURE — 3079F DIAST BP 80-89 MM HG: CPT | Performed by: NURSE PRACTITIONER

## 2024-10-14 PROCEDURE — 3074F SYST BP LT 130 MM HG: CPT | Performed by: NURSE PRACTITIONER

## 2024-10-14 PROCEDURE — 11042 DBRDMT SUBQ TIS 1ST 20SQCM/<: CPT

## 2024-10-21 ENCOUNTER — NON-PROVIDER VISIT (OUTPATIENT)
Dept: WOUND CARE | Facility: MEDICAL CENTER | Age: 39
End: 2024-10-21
Attending: NURSE PRACTITIONER
Payer: COMMERCIAL

## 2024-10-21 PROCEDURE — 97597 DBRDMT OPN WND 1ST 20 CM/<: CPT

## 2024-10-28 ENCOUNTER — NON-PROVIDER VISIT (OUTPATIENT)
Dept: WOUND CARE | Facility: MEDICAL CENTER | Age: 39
End: 2024-10-28
Attending: NURSE PRACTITIONER
Payer: COMMERCIAL

## 2024-10-28 PROCEDURE — 97602 WOUND(S) CARE NON-SELECTIVE: CPT

## 2024-11-04 ENCOUNTER — APPOINTMENT (OUTPATIENT)
Dept: WOUND CARE | Facility: MEDICAL CENTER | Age: 39
End: 2024-11-04
Attending: NURSE PRACTITIONER
Payer: COMMERCIAL

## 2024-11-11 ENCOUNTER — NON-PROVIDER VISIT (OUTPATIENT)
Dept: WOUND CARE | Facility: MEDICAL CENTER | Age: 39
End: 2024-11-11
Attending: NURSE PRACTITIONER
Payer: COMMERCIAL

## 2024-11-18 ENCOUNTER — NON-PROVIDER VISIT (OUTPATIENT)
Dept: WOUND CARE | Facility: MEDICAL CENTER | Age: 39
End: 2024-11-18
Attending: NURSE PRACTITIONER
Payer: COMMERCIAL

## 2024-11-18 PROCEDURE — 97602 WOUND(S) CARE NON-SELECTIVE: CPT

## 2024-11-18 NOTE — PATIENT INSTRUCTIONS
- Keep dressings clean and dry. Change dressings every 3-4 days, and if they become over saturated, soiled or fall off.     - If you need to change your dressings at home: Wash your wound with normal saline, wound cleanser, or unscented soap and water prior to applying your new dressings. Please avoid cleansing with hydrogen peroxide or rubbing alcohol. Hydrogen peroxide and rubbing alcohol are toxic to new tissue and skin cells.    - Should you experience any significant changes in your wound(s), such as infection (redness, swelling, localized heat, increased pain, fever > 101 F, chills) or have any questions regarding your home care instructions, please contact the wound center at [phone]. If after hours, contact your primary care physician or go to the hospital emergency room.     - If you are admitted to any hospital, you will need a new referral to come back to the wound clinic and any scheduled appointments that you already have, may be cancelled.    - If you are 5 or more minutes late for an appointment, we reserve the right to cancel and reschedule that appointment. Additionally, if you are habitually late or not showing (3 late cancellations and/or no shows), we reserve the right to cancel your remaining appointments and it will be your responsibility to obtain a new referral if services are still needed.

## 2024-11-18 NOTE — PROGRESS NOTES
Non-Selective Debridement with puracyn spray, gauze and forceps to debride non-viable tissue from the wound bed and periwound areas. Refer to flow sheet for wound care details. Patient tolerated the procedure well.

## 2024-11-25 ENCOUNTER — NON-PROVIDER VISIT (OUTPATIENT)
Dept: WOUND CARE | Facility: MEDICAL CENTER | Age: 39
End: 2024-11-25
Attending: NURSE PRACTITIONER
Payer: COMMERCIAL

## 2024-11-25 PROCEDURE — 97602 WOUND(S) CARE NON-SELECTIVE: CPT

## 2024-11-25 NOTE — PROGRESS NOTES
Non-Selective Debridement with puracyn spray and gauze to debride non-viable tissue from the wound bed and periwound areas. Refer to flow sheet for wound care details. Patient tolerated the procedure well.

## 2024-11-25 NOTE — PATIENT INSTRUCTIONS
- Keep dressings clean and dry. Change dressings every 3-4 days, and if they become over saturated, soiled or fall off.     - If you need to change your dressings at home: Wash your wound with normal saline, wound cleanser, or unscented soap and water prior to applying your new dressings. Please avoid cleansing with hydrogen peroxide or rubbing alcohol. Hydrogen peroxide and rubbing alcohol are toxic to new tissue and skin cells.    - Should you experience any significant changes in your wound(s), such as infection (redness, swelling, localized heat, increased pain, fever > 101 F, chills) or have any questions regarding your home care instructions, please contact the wound center at [phone]. If after hours, contact your primary care physician or go to the hospital emergency room.     - If you are admitted to any hospital, you will need a new referral to come back to the wound clinic and any scheduled appointments that you already have, may be cancelled.    - If you are 5 or more minutes late for an appointment, we reserve the right to cancel and reschedule that appointment. Additionally, if you are habitually late or not showing (3 late cancellations and/or no shows), we reserve the right to cancel your remaining appointments and it will be your responsibility to obtain a new referral if services are still needed.

## 2024-12-04 ENCOUNTER — NON-PROVIDER VISIT (OUTPATIENT)
Dept: WOUND CARE | Facility: MEDICAL CENTER | Age: 39
End: 2024-12-04
Attending: NURSE PRACTITIONER
Payer: COMMERCIAL

## 2024-12-04 PROCEDURE — 97602 WOUND(S) CARE NON-SELECTIVE: CPT

## 2024-12-04 NOTE — PATIENT INSTRUCTIONS
- Keep dressings clean and dry. Change dressings every 3-4 days, and if they become over saturated, soiled or fall off.     - If you need to change your dressings at home: Wash your wound with normal saline, wound cleanser, or unscented soap and water prior to applying your new dressings. Please avoid cleansing with hydrogen peroxide or rubbing alcohol. Hydrogen peroxide and rubbing alcohol are toxic to new tissue and skin cells.    - Should you experience any significant changes in your wound(s), such as infection (redness, swelling, localized heat, increased pain, fever > 101 F, chills) or have any questions regarding your home care instructions, please contact the wound center at [phone]. If after hours, contact your primary care physician or go to the hospital emergency room.     - If you are admitted to any hospital, you will need a new referral to come back to the wound clinic and any scheduled appointments that you already have, may be cancelled.    - If you are 5 or more minutes late for an appointment, we reserve the right to cancel and reschedule that appointment. Additionally, if you are habitually late or not showing (3 late cancellations and/or no shows), we reserve the right to cancel your remaining appointments and it will be your responsibility to obtain a new referral if services are still needed.

## 2024-12-09 ENCOUNTER — NON-PROVIDER VISIT (OUTPATIENT)
Dept: WOUND CARE | Facility: MEDICAL CENTER | Age: 39
End: 2024-12-09
Attending: NURSE PRACTITIONER
Payer: COMMERCIAL

## 2024-12-09 PROCEDURE — 97602 WOUND(S) CARE NON-SELECTIVE: CPT

## 2024-12-09 NOTE — PROGRESS NOTES
Nonselective debridement with puracyn spray and gauze to remove nonviable tissue from wound bed.     Pt declined sharp debridement this visit as he has work related class and does not wish to be in pain.

## 2024-12-09 NOTE — PATIENT INSTRUCTIONS
Should you experience any significant changes in your wound(s) such as infection (redness, swelling, localized heat, increased pain, fever >101 F, chills) or have any questions regarding your home care instructions, please contact the wound center [phone]. If after hours, contact your primary care physician or go the hospital emergency room.  Keep dressing clean and dry and cover while bathing. Only change dressing if over saturated, soiled or its falling off.

## 2024-12-16 ENCOUNTER — NON-PROVIDER VISIT (OUTPATIENT)
Dept: WOUND CARE | Facility: MEDICAL CENTER | Age: 39
End: 2024-12-16
Attending: NURSE PRACTITIONER
Payer: COMMERCIAL

## 2024-12-16 PROCEDURE — 97602 WOUND(S) CARE NON-SELECTIVE: CPT

## 2024-12-16 NOTE — PROGRESS NOTES
Non-Selective Debridement with hypochlorous acid and gauze to debride non-viable tissue from the wound bed and periwound areas. Refer to flow sheet for wound care details. Patient tolerated the procedure well.

## 2024-12-16 NOTE — PATIENT INSTRUCTIONS
- Keep dressings clean and dry. Change dressings every 3-4 days, and if they become over saturated, soiled or fall off.     - If you need to change your dressings at home: Wash your wound with normal saline, wound cleanser, or unscented soap and water prior to applying your new dressings. Please avoid cleansing with hydrogen peroxide or rubbing alcohol. Hydrogen peroxide and rubbing alcohol are toxic to new tissue and skin cells.    - Should you experience any significant changes in your wound(s), such as infection (redness, swelling, localized heat, increased pain, fever > 101 F, chills) or have any questions regarding your home care instructions, please contact the wound center at [phone]. If after hours, contact your primary care physician or go to the hospital emergency room.     - If you are admitted to any hospital, you will need a new referral to come back to the wound clinic and any scheduled appointments that you already have, may be cancelled.    - If you are 5 or more minutes late for an appointment, we reserve the right to cancel and reschedule that appointment. Additionally, if you are habitually late or not showing (3 late cancellations and/or no shows), we reserve the right to cancel your remaining appointments and it will be your responsibility to obtain a new referral if services are still needed.

## 2024-12-23 ENCOUNTER — OFFICE VISIT (OUTPATIENT)
Dept: WOUND CARE | Facility: MEDICAL CENTER | Age: 39
End: 2024-12-23
Attending: NURSE PRACTITIONER
Payer: COMMERCIAL

## 2024-12-23 VITALS
TEMPERATURE: 96.7 F | HEART RATE: 83 BPM | SYSTOLIC BLOOD PRESSURE: 106 MMHG | RESPIRATION RATE: 20 BRPM | DIASTOLIC BLOOD PRESSURE: 78 MMHG | OXYGEN SATURATION: 93 %

## 2024-12-23 DIAGNOSIS — I87.2 VENOUS INSUFFICIENCY OF BOTH LOWER EXTREMITIES: ICD-10-CM

## 2024-12-23 DIAGNOSIS — L97.929: ICD-10-CM

## 2024-12-23 DIAGNOSIS — L97.818 NON-PRESSURE CHRONIC ULCER OF OTHER PART OF RIGHT LOWER LEG WITH OTHER SPECIFIED SEVERITY (HCC): ICD-10-CM

## 2024-12-23 DIAGNOSIS — F17.200 NICOTINE DEPENDENCE WITH CURRENT USE: ICD-10-CM

## 2024-12-23 DIAGNOSIS — R52 PAIN ASSOCIATED WITH WOUND: ICD-10-CM

## 2024-12-23 DIAGNOSIS — E11.9 TYPE 2 DIABETES MELLITUS WITHOUT COMPLICATION, WITHOUT LONG-TERM CURRENT USE OF INSULIN (HCC): ICD-10-CM

## 2024-12-23 DIAGNOSIS — I83.813 VARICOSE VEINS OF BILATERAL LOWER EXTREMITIES WITH PAIN: ICD-10-CM

## 2024-12-23 DIAGNOSIS — D68.61 ANTIPHOSPHOLIPID SYNDROME (HCC): ICD-10-CM

## 2024-12-23 DIAGNOSIS — T14.8XXA PAIN ASSOCIATED WITH WOUND: ICD-10-CM

## 2024-12-23 PROCEDURE — 11042 DBRDMT SUBQ TIS 1ST 20SQCM/<: CPT | Performed by: STUDENT IN AN ORGANIZED HEALTH CARE EDUCATION/TRAINING PROGRAM

## 2024-12-23 PROCEDURE — 3074F SYST BP LT 130 MM HG: CPT | Performed by: STUDENT IN AN ORGANIZED HEALTH CARE EDUCATION/TRAINING PROGRAM

## 2024-12-23 PROCEDURE — 11042 DBRDMT SUBQ TIS 1ST 20SQCM/<: CPT

## 2024-12-23 PROCEDURE — 3078F DIAST BP <80 MM HG: CPT | Performed by: STUDENT IN AN ORGANIZED HEALTH CARE EDUCATION/TRAINING PROGRAM

## 2024-12-23 NOTE — PATIENT INSTRUCTIONS
Should you experience any significant changes in your wound(s) such as infection (redness, swelling, localized heat, increased pain, fever >101 F, chills) or have any questions regarding your home care instructions, please contact the wound center [phone]. If after hours, contact your primary care physician or go the hospital emergency room.  Keep dressing clean and dry and cover while bathing. Only change dressing if over saturated, soiled or its falling off.

## 2024-12-23 NOTE — PROGRESS NOTES
Provider Encounter- Lower Extremity Ulcer      HISTORY OF PRESENT ILLNESS  Wound History:    START OF CARE IN CLINIC: 5/6/2024    REFERRING PROVIDER:   Alistair MARLOW     WOUND ETIOLOGY: venous   LOCATION: L posterior lateral calf     Right lateral calf     HISTORY: Patient developed an ulcer to his left posterior lateral calf in February 2024.  Denies any injury.  Does not know how the ulcer started.  He also developed an ulcer to his right lateral calf.  Does not recall when or how this ulcer started.  Per chart review, ulcer was noted at his wound care appointment on 5/20/2024 that patient reported was new that day.  He has been performing  self wound care for several months including Vaseline, Neosporin with Band-Aids until he followed up with his PCP and was referred to wound care clinic treatment.  The ulcer formed an eschar.  Approximately 6/23/2024, the eschar fell off, causing increased pain and tenderness to patient's leg.  Since the ulcer has occurred, patient has received a prescription for Cipro in beginning of May 2024.  He was admitted from 5/11/2024 - 5/13/2024 for sepsis and elevated liver enzymes.  Received IV antibiotics and discharged home on Augmentin and doxycycline.  Patient completed antibiotics.      Pertinent Medical History: Acute kidney injury, antiphospholipid syndrome, JAYLIN, CVA, morbid obesity, hypertension, nonalcoholic fatty liver disease, type 2 diabetes without long-term insulin   ETIOLOGY HISTORY: Diagnosed: Unknown. Vascular Surgeon: None. Previous vascular procedures none. Compression none. Varicose Veins: Yes  Diagnosed with diabetes greater than 1 year.  Patient does not recall when he was diagnosed eccentrically.  Controls diabetes with oral diabetic medications.  Checks blood sugars daily.  Averages around 130s to 160s.  Denies any numbness or tingling to feet.  Checks feet occasionally.  Does not have orthotics.      TOBACCO USE: Half pack per day    Patient's problem  list, allergies, and current medications reviewed and updated in Epic    Interval History:  6/25/2024 initial provider Clinic visit with Sally MARLOW, FNP-BC, CWON, CFDAVID.  Pt denies fevers, chills, nausea, vomiting.  Blood sugar 130 today.  Reports no sleep for the last 2 days secondary to pain from his left calf.    7/22/2024: Clinic visit with Doug Bond MD. Patient reports frustration over chronic pain and continuance of wounds. His wounds have not made improvement. He reports that he did not tolerate debridement last week. He has areas of painful varicose veins medial aspect of leg. He was counseled extensively on suspected venous insufficiency, etiology of disease, and options for treatment. We discussed importance of compression and that pain associated with compression use should improve as edema improves. We also discussed the possibility of being evaluated for venous ablation, he would like to pursue if candidate. Will place referral to [facility] vein clinic.    7/29/2024: Clinic visit with Doug Bond MD. Patient reports doing ok. Reports pain improved with wearing compression. One of the previously stable eschars to left posterior leg fell off this week. We discussed increasing compression today. We discussed Unna boot, he declined using and would prefer to wear compression socks. Measured and given information on purchasing compression socks. Recommend he bring compression socks to next clinic visit. He has not received appointment from [facility] vein clinic, given number to make appointment.    8/6/2024: Clinic visit with Doug Bond MD. Patient reports feeling ok. Eschar right leg fell off few days ago. Appears to be starting new small satellite wound left posterior leg. He reports that he has appointment with [facility] on 9/9. Patient reports that compression socks have been ordered, but have not been delivered yet. He would like to avoid multilayer wrap and use tubigrip until  compression socks are delivered.    8/26/2024: Clinic visit with Doug Bond MD. Patient reports doing ok. He denies any acute issue. Slightly increased drainage this week. He ordered compression socks, he never received and possibly stolen after delivery. Given information for [facility], which I believe carry compression socks. Patient does not want multilayer wrap. Presented without compression today, reports that he has been wearing every day but did not wear for appointment today. Patient will try to cut down smoking.    10/14/2024 : Clinic visit with ELE Colin, FNP-BC, CWOCN, CFCN.   Patient presents today with layer of slough and eschar to left posterior calf wound.  He is not wearing compression stockings today, but states that he normally does.  His right lower extremity wound is resolved.  He has been seen at [facility] vein clinic, ablation procedure recommended, awaiting insurance authorization.  He reports his blood sugar today was 139.  He continues to smoke 1/2 pack/day.    12/23/2024: Clinic visit with Doug Bond MD. Patient reports doing well, reports pain has improved since last seen. Wounds smaller. He does not have compression socks on today, he emphatically promises that he has been wearing daily. Reinforced importance of compression. Patient was seen by [facility] vein clinic, reports they are still waiting on insurance authorization for ablations.    REVIEW OF SYSTEMS:   Unchanged from previous wound clinic assessment on 10/14/2024, except as noted in interval history above    PHYSICAL EXAMINATION:   /78   Pulse 83   Temp 35.9 °C (96.7 °F) (Temporal)   Resp 20   SpO2 93%     Physical Exam  Vitals reviewed.   Cardiovascular:      Rate and Rhythm: Normal rate.      Pulses: Normal pulses.      Comments: Right palpable DP and PT.  With Doppler biphasic tones noted to PT/DP  Faintly palpable left DP.  Absent PT brisk tones noted to PT/DP  Pulmonary:      Effort:  Pulmonary effort is normal.   Abdominal:      Palpations: Abdomen is soft.   Musculoskeletal:      Comments: mycotic toenails  +2 nonpitting edema BLE   Skin:     General: Skin is warm and dry.      Comments: Right lateral calf: Resolved    Left lateral posterior/distal calf wound: Wound smaller, crusted with some trapped turbid fluid. No evidence of infection.    Left lateral posterior/small calf wound: Near resolution with some dried stable scab   Neurological:      Mental Status: He is alert and oriented to person, place, and time.      Comments: With monofilament sensed 10/10 to both feet   Psychiatric:         Mood and Affect: Affect normal.         Cognition and Memory: Memory normal.     Monofilament testing with a 10 gram force: sensation intact: intact bilaterally  Visual Inspection: Feet without maceration, ulcers, fissures.  Pedal pulses: intact bilaterally      WOUND ASSESSMENT  Wound 05/06/24 Full Thickness Wound Left Posterior Lower Leg Distal--DO NOT USE HYPOCHLOROUS ACID OR TRIAD (Active)   Wound Image    12/23/24 1045   Site Assessment Scabbed 12/23/24 1045   Periwound Assessment Scar tissue;Edema 12/23/24 1045   Margins Attached edges 12/23/24 1045   Drainage Amount None 12/23/24 1045   Drainage Description Serosanguineous 12/16/24 1400   Treatments Cleansed;Topical Lidocaine;Provider debridement;Site care 12/23/24 1045   Wound Cleansing Hypochlorus Acid 12/23/24 1045   Periwound Protectant No-sting Skin Prep 12/23/24 1045   Dressing Status Old drainage 09/09/24 1600   Dressing Changed Changed 12/23/24 1045   Dressing Cleansing/Solutions Not Applicable 12/23/24 1045   Dressing Options Hydrofiber Silver 12/23/24 1045   Dressing Change/Treatment Frequency Weekly, and As Needed 12/23/24 1045   Wound Team Following Weekly 12/23/24 1045   Non-staged Wound Description Full thickness 12/23/24 1045   Wound Length (cm) 0.9 cm 12/16/24 1400   Wound Width (cm) 0.7 cm 12/16/24 1400   Wound Depth (cm) 0.1 cm  12/16/24 1400   Wound Surface Area (cm^2) 0.63 cm^2 12/16/24 1400   Wound Volume (cm^3) 0.063 cm^3 12/16/24 1400   Post-Procedure Length (cm) 0.5 cm 12/23/24 1045   Post-Procedure Width (cm) 0.9 cm 12/23/24 1045   Post-Procedure Depth (cm) 0.1 cm 12/23/24 1045   Post-Procedure Surface Area (cm^2) 0.45 cm^2 12/23/24 1045   Post-Procedure Volume (cm^3) 0.045 cm^3 12/23/24 1045   Wound Healing % 97 12/16/24 1400   Wound Bed Granulation (%) 100 % 09/09/24 1600   Tunneling (cm) 0 cm 12/23/24 1045   Undermining (cm) 0 cm 12/23/24 1045   Wound Odor None 12/23/24 1045   Pulses Left;1+;DP 10/28/24 1500   Exposed Structures None 12/23/24 1045   Number of days: 231       Wound 12/04/24 Tibia Posterior;Lateral Left (Active)   Wound Image   12/23/24 1045   Site Assessment Scabbed 12/23/24 1045   Periwound Assessment Scar tissue;Edema 12/23/24 1045   Margins Attached edges 12/23/24 1045   Closure Secondary intention 12/16/24 1400   Drainage Amount None 12/23/24 1045   Drainage Description Serosanguineous 12/16/24 1400   Treatments Cleansed;Site care 12/23/24 1045   Wound Cleansing Hypochlorus Acid 12/23/24 1045   Periwound Protectant No-sting Skin Prep 12/23/24 1045   Dressing Changed New 12/23/24 1045   Dressing Cleansing/Solutions Not Applicable 12/23/24 1045   Dressing Options Silicone Adhesive Foam 12/23/24 1045   Dressing Change/Treatment Frequency Weekly, and As Needed 12/23/24 1045   Wound Team Following Weekly 12/23/24 1045   Wound Length (cm) 0.2 cm 12/16/24 1400   Wound Width (cm) 0.2 cm 12/16/24 1400   Wound Depth (cm) 0.1 cm 12/16/24 1400   Wound Surface Area (cm^2) 0.04 cm^2 12/16/24 1400   Wound Volume (cm^3) 0.004 cm^3 12/16/24 1400   Post-Procedure Length (cm) 0.3 cm 12/04/24 1300   Post-Procedure Width (cm) 0.3 cm 12/04/24 1300   Post-Procedure Depth (cm) 0.1 cm 12/04/24 1300   Post-Procedure Surface Area (cm^2) 0.09 cm^2 12/04/24 1300   Post-Procedure Volume (cm^3) 0.009 cm^3 12/04/24 1300   Wound Healing % 56  12/16/24 1400   Tunneling (cm) 0 cm 12/23/24 1045   Undermining (cm) 0 cm 12/23/24 1045   Wound Odor None 12/23/24 1045   Exposed Structures None 12/23/24 1045   Number of days: 19       PROCEDURE: Excisional debridement of left posterior/distal leg wound  -2% viscous lidocaine applied topically to wound bed for approximately 5 minutes prior to debridement  -Scalpel and forceps used to lift and excise slough/eschar from wound bed  -Curette then used to debride remaining slough from wound bed.  Excisional debridement was performed to remove devitalized tissue until healthy, bleeding tissue was visualized.   Entire surface of wound, 0.45 cm² debrided.  Tissue debrided into the subcutaneous layer.    -Bleeding controlled with manual pressure.    -Wound care completed by wound RN, refer to flowsheet  -Patient tolerated the procedure well, without c/o pain or discomfort.    Pertinent Labs and Diagnostics:    Labs:   A1c:   Lab Results   Component Value Date/Time    HBA1C 8.4 (H) 09/28/2021 10:21 AM            IMAGING: None    VASCULAR STUDIES:   No results found.    LAST  WOUND CULTURE:  DATE :    Lab Results   Component Value Date/Time    CULTRSULT  05/10/2024 11:10 PM     Usual urogenital portillo >100,000 cfu/mL  3 or more organisms isolated, culture of doubtful  significance, please recollect.                ASSESSMENT AND PLAN:     1. Chronic ulcer of left lower leg (HCC)  2. Non-pressure chronic ulcer of other part of right lower leg with other specified severity (HCC)  Left leg ulcer started per patient February 2024.  Right leg ulcer noted by patient mid May 2024    12/23/2024: Wounds are significantly smaller. Proximal leg with intact dried scab, appears near resolved. Left distal posterior lower leg wound is smaller with some turbid fluid under crust.  - Excisional debridement of distal wound was performed in clinic, medically necessary to promote wound healing.  - Patient reports he has been wearing compression  socks daily.  - He states that he only removed them today because he was coming in for wound care.  - Patient to return to clinic weekly for assessment and debridement  - Patient to change dressing 1 per week as needed  - He has been seen at [facility] vein clinic, and ablation procedure recommended.  Awaiting authorization from patient's insurance    Wound care: Hydrofiber silver, Silicone foam, own compression socks    3. Type 2 diabetes mellitus without complication, without long-term current use of insulin ([facility])    12/23/2024: Patient reports his blood sugar has been fairly well controlled.  - Continue to keep blood sugars less than 140 for improved wound healing  - Patient sensate to both feet.  Sense 10 out of 10 with monofilament  - Discussed follow-up with podiatrist for routine foot nail care as his toenails are thick and mycotic.  Patient declined referral to podiatry    4. Venous insufficiency of both lower extremities  5. Varicose Veins of bilateral lower extremities with pain    12/23/2024  - Patient has been resistant to wearing compression due to discomfort in past.  - Previously counseled extensively on etiology of disease and importance of compression.  -Compression socks were provided to him twice with vein clinic, see above  -- Patient has painful varicose veins medial leg that are not associated with wound.  Has been seen at [facility] vein clinic, see above  -Likely need for lifelong compression of lower legs   -Elevate legs above the level of the heart periodically throughout the day.    5. Antiphospholipid syndrome (HCC)    12/23/2024: Complicating factor.  Patient on Coumadin.  Follows with Coumadin clinic monthly.  Checks INR himself.    6. Pain associated with wound    10/23/2024: Patient complained of significant pain with debridement today despite use of topical lidocaine  - Patient to follow-up with PCP regarding pain medications    7. Nicotine dependence with current use    12/23/2024: Continues  to smoke cigarettes, approximate 1/2 pack/day  -Reviewed adverse effects of nicotine on wound healing, and on overall health.  He is still not interested in quitting    PATIENT EDUCATION  - Etiology of  venous stasis ulceration discussed with patient  - Importance of managing edema for healing of ulcer, and for prevention of new ulcer development  -Need for lifelong compression of lower legs   -Elevate legs above the level of the heart periodically throughout the day.  - Importance of adequate nutrition for wound healing  -Advised to go to ER for any increased redness, swelling, drainage or odor, or if patient develops fever, chills, nausea or vomiting.    Please note that this note may have been created using voice recognition software. I have worked with technical experts from [employer] to optimize the interface.  I have made every reasonable attempt to correct obvious errors, but there may be errors of grammar and possibly     N

## 2024-12-30 ENCOUNTER — NON-PROVIDER VISIT (OUTPATIENT)
Dept: WOUND CARE | Facility: MEDICAL CENTER | Age: 39
End: 2024-12-30
Attending: NURSE PRACTITIONER
Payer: COMMERCIAL

## 2024-12-30 PROCEDURE — 97597 DBRDMT OPN WND 1ST 20 CM/<: CPT

## 2024-12-30 NOTE — PROGRESS NOTES
Conservative sharp wound debridement with curette to debride wound bed and periwound of non-viable tissue. Entire surface of wound, < 20 cm2 debrided. Refer to flow sheet for wound care details. Patient tolerated the procedure well.

## 2024-12-30 NOTE — PATIENT INSTRUCTIONS
- Keep dressings clean and dry. Change dressings every 3-4 days, and if they become over saturated, soiled or fall off.     - If you need to change your dressings at home: Wash your wound with normal saline, wound cleanser, or unscented soap and water prior to applying your new dressings. Please avoid cleansing with hydrogen peroxide or rubbing alcohol. Hydrogen peroxide and rubbing alcohol are toxic to new tissue and skin cells.    - Should you experience any significant changes in your wound(s), such as infection (redness, swelling, localized heat, increased pain, fever > 101 F, chills) or have any questions regarding your home care instructions, please contact the wound center at [phone]. If after hours, contact your primary care physician or go to the hospital emergency room.     - If you are admitted to any hospital, you will need a new referral to come back to the wound clinic and any scheduled appointments that you already have, may be cancelled.    - If you are 5 or more minutes late for an appointment, we reserve the right to cancel and reschedule that appointment. Additionally, if you are habitually late or not showing (3 late cancellations and/or no shows), we reserve the right to cancel your remaining appointments and it will be your responsibility to obtain a new referral if services are still needed.

## 2025-01-06 ENCOUNTER — APPOINTMENT (OUTPATIENT)
Dept: WOUND CARE | Facility: MEDICAL CENTER | Age: 40
End: 2025-01-06
Attending: NURSE PRACTITIONER
Payer: COMMERCIAL

## 2025-01-13 ENCOUNTER — NON-PROVIDER VISIT (OUTPATIENT)
Dept: WOUND CARE | Facility: MEDICAL CENTER | Age: 40
End: 2025-01-13
Attending: NURSE PRACTITIONER
Payer: COMMERCIAL

## 2025-01-13 PROCEDURE — 97602 WOUND(S) CARE NON-SELECTIVE: CPT

## 2025-01-13 NOTE — PROGRESS NOTES
Pt with new wound to Left posterior LE.  He scraped it on a table 4 days ago.  Non-selective debridement of wound using wound cleanser and dry gauze.  Pt states it's superficial and doesn't really hurt.    Pt instructed to wear compression sock to Left leg to help with swelling.

## 2025-01-13 NOTE — PATIENT INSTRUCTIONS
After Visit Summary Wound Care Instructions    Nutrition - Patient instructed increased protein diet unless contraindicated in renal failure (meat, eggs, fish, yogurt, cottage cheese, beans), use of multivitamin with minerals and Arginaid supplementation (check if ok with Primary Care Provider first).    Infection -  instructed signs and symptoms of infection, increased redness and swelling, localized heat over wound and surrounding area/fever/chills/nausea and vomiting, when to call doctor or go to Emergency Room.     Dressing Changes - Instructed patient rationale for wound care products. Instructed to keep dressings clean and dry, shower on clinic days right before coming in. Change your dressing if it becomes soiled, soaked, or falls off.    Questions - should you have any questions regarding your home care instructions, please contact the wound center at [phone]. If after hours, contact your primary care physician or go to the hospital emergency room.   If you are admitted to any hospital, you will need a new referral to come back to the wound clinic and any scheduled appointments that you already have, may be cancelled.

## 2025-01-20 ENCOUNTER — APPOINTMENT (OUTPATIENT)
Dept: WOUND CARE | Facility: MEDICAL CENTER | Age: 40
End: 2025-01-20
Attending: NURSE PRACTITIONER
Payer: COMMERCIAL

## 2025-01-27 ENCOUNTER — NON-PROVIDER VISIT (OUTPATIENT)
Dept: WOUND CARE | Facility: MEDICAL CENTER | Age: 40
End: 2025-01-27
Attending: NURSE PRACTITIONER
Payer: COMMERCIAL

## 2025-01-27 PROCEDURE — 97597 DBRDMT OPN WND 1ST 20 CM/<: CPT

## 2025-01-27 NOTE — PATIENT INSTRUCTIONS
- Keep dressings clean and dry. Change dressings every 3-4 days, and if they become over saturated, soiled or fall off.     - If you need to change your dressings at home: Wash your wound with normal saline, wound cleanser, or unscented soap and water prior to applying your new dressings. Please avoid cleansing with hydrogen peroxide or rubbing alcohol. Hydrogen peroxide and rubbing alcohol are toxic to new tissue and skin cells.    - Should you experience any significant changes in your wound(s), such as infection (redness, swelling, localized heat, increased pain, fever > 101 F, chills) or have any questions regarding your home care instructions, please contact the wound center at [phone]. If after hours, contact your primary care physician or go to the hospital emergency room.     - If you are admitted to any hospital, you will need a new referral to come back to the wound clinic and any scheduled appointments that you already have, may be cancelled.    - If you are 5 or more minutes late for an appointment, we reserve the right to cancel and reschedule that appointment. Additionally, if you are habitually late or not showing (3 late cancellations and/or no shows), we reserve the right to cancel your remaining appointments and it will be your responsibility to obtain a new referral if services are still needed.

## 2025-01-27 NOTE — PROGRESS NOTES
2% viscous lidocaine applied topically to wound bed for approximately 5 minutes prior to debridement. Conservative sharp wound debridement with curette to debride left posterior lateral LE wound bed and periwound callus of non-viable tissue. Entire surface of wound, < 20 cm2 debrided. Refer to flow sheet for wound care details. Patient tolerated the procedure well.

## 2025-02-10 ENCOUNTER — NON-PROVIDER VISIT (OUTPATIENT)
Dept: WOUND CARE | Facility: MEDICAL CENTER | Age: 40
End: 2025-02-10
Attending: NURSE PRACTITIONER
Payer: COMMERCIAL

## 2025-02-10 PROCEDURE — 97597 DBRDMT OPN WND 1ST 20 CM/<: CPT

## 2025-02-10 NOTE — PROGRESS NOTES
2% Viscous lidocaine applied to wound and periwound ~8 minutes dwell time.  CSWD using curette to remove approx. ~0.4 cm2 of nonviable tissue from wound bed.    Pt requesting to decrease visits to monthly due to work and work training schedules. PAR notified.      Caller: Vish Russo    Relationship: Self    Best call back number: [phone]    Requested Prescriptions:   Requested Prescriptions     Pending Prescriptions Disp Refills   • diazePAM (VALIUM) 5 MG tablet 60 tablet 2     Sig: Take 1 tablet by mouth Every 12 (Twelve) Hours As Needed for Anxiety.        Pharmacy where request should be sent: [address] - [phone] [facility] [phone] FX     Last office visit with prescribing clinician: 5/11/2022   Last telemedicine visit with prescribing clinician: 4/27/2023   Next office visit with prescribing clinician: Visit date not found     Additional details provided by patient: PATIENT IS OUT OF THE MEDICATION     Does the patient have less than a 3 day supply:  [x] Yes  [] No    Would you like a call back once the refill request has been completed: [x] Yes [] No    If the office needs to give you a call back, can they leave a voicemail: [x] Yes [] No    Gerald Mcfarlane Rep   05/16/23 08:20 EDT

## 2025-02-10 NOTE — PATIENT INSTRUCTIONS
-Keep your wound dressing clean, dry, and intact. Change dressing if it's over saturated, soiled or falls off.     -Change dressing every week: Remove dressings. Clean wound with normal saline or wound cleanser. Pat dry with dry gauze or clean washcloth. Apply skin prep to periwound. Apply Aquacel Ag to wound bed as directed by provider. Cover wound with dry dressing.    -Should you experience any significant changes in your wound(s), such as infection (redness, swelling, localized heat, increased pain, fever > 101 F, chills) or have any questions regarding your home care instructions, please contact the wound center at [phone]. If after hours, contact your primary care physician or go to the hospital emergency room.     If you are admitted to any hospital, you will need a new referral to come back to the wound clinic and any scheduled appointments that you already have, may be cancelled.

## 2025-02-24 ENCOUNTER — APPOINTMENT (OUTPATIENT)
Dept: WOUND CARE | Facility: MEDICAL CENTER | Age: 40
End: 2025-02-24
Attending: NURSE PRACTITIONER
Payer: COMMERCIAL

## 2025-03-10 ENCOUNTER — OFFICE VISIT (OUTPATIENT)
Dept: WOUND CARE | Facility: MEDICAL CENTER | Age: 40
End: 2025-03-10
Attending: NURSE PRACTITIONER
Payer: COMMERCIAL

## 2025-03-10 DIAGNOSIS — D68.61 ANTIPHOSPHOLIPID SYNDROME (HCC): ICD-10-CM

## 2025-03-10 DIAGNOSIS — R52 PAIN ASSOCIATED WITH WOUND: ICD-10-CM

## 2025-03-10 DIAGNOSIS — T14.8XXA PAIN ASSOCIATED WITH WOUND: ICD-10-CM

## 2025-03-10 DIAGNOSIS — F17.200 NICOTINE DEPENDENCE WITH CURRENT USE: ICD-10-CM

## 2025-03-10 DIAGNOSIS — L97.818 NON-PRESSURE CHRONIC ULCER OF OTHER PART OF RIGHT LOWER LEG WITH OTHER SPECIFIED SEVERITY (HCC): ICD-10-CM

## 2025-03-10 DIAGNOSIS — E11.9 TYPE 2 DIABETES MELLITUS WITHOUT COMPLICATION, WITHOUT LONG-TERM CURRENT USE OF INSULIN (HCC): ICD-10-CM

## 2025-03-10 DIAGNOSIS — L97.929: ICD-10-CM

## 2025-03-10 DIAGNOSIS — I83.813 VARICOSE VEINS OF BILATERAL LOWER EXTREMITIES WITH PAIN: ICD-10-CM

## 2025-03-10 DIAGNOSIS — I87.2 VENOUS INSUFFICIENCY OF BOTH LOWER EXTREMITIES: ICD-10-CM

## 2025-03-10 PROCEDURE — 99213 OFFICE O/P EST LOW 20 MIN: CPT | Mod: 25 | Performed by: NURSE PRACTITIONER

## 2025-03-10 PROCEDURE — 11042 DBRDMT SUBQ TIS 1ST 20SQCM/<: CPT | Performed by: NURSE PRACTITIONER

## 2025-03-10 PROCEDURE — 99213 OFFICE O/P EST LOW 20 MIN: CPT

## 2025-03-10 PROCEDURE — 11042 DBRDMT SUBQ TIS 1ST 20SQCM/<: CPT

## 2025-03-10 NOTE — PROGRESS NOTES
Provider Encounter- Lower Extremity Ulcer      HISTORY OF PRESENT ILLNESS  Wound History:    START OF CARE IN CLINIC: 5/6/2024    REFERRING PROVIDER:   Alistair MARLOW     WOUND ETIOLOGY: venous   LOCATION: L posterior lateral calf     Right lateral calf     HISTORY: Patient developed an ulcer to his left posterior lateral calf in February 2024.  Denies any injury.  Does not know how the ulcer started.  He also developed an ulcer to his right lateral calf.  Does not recall when or how this ulcer started.  Per chart review, ulcer was noted at his wound care appointment on 5/20/2024 that patient reported was new that day.  He has been performing  self wound care for several months including Vaseline, Neosporin with Band-Aids until he followed up with his PCP and was referred to wound care clinic treatment.  The ulcer formed an eschar.  Approximately 6/23/2024, the eschar fell off, causing increased pain and tenderness to patient's leg.  Since the ulcer has occurred, patient has received a prescription for Cipro in beginning of May 2024.  He was admitted from 5/11/2024 - 5/13/2024 for sepsis and elevated liver enzymes.  Received IV antibiotics and discharged home on Augmentin and doxycycline.  Patient completed antibiotics.      Pertinent Medical History: Acute kidney injury, antiphospholipid syndrome, JAYLIN, CVA, morbid obesity, hypertension, nonalcoholic fatty liver disease, type 2 diabetes without long-term insulin   ETIOLOGY HISTORY: Diagnosed: Unknown. Vascular Surgeon: None. Previous vascular procedures none. Compression none. Varicose Veins: Yes  Diagnosed with diabetes greater than 1 year.  Patient does not recall when he was diagnosed eccentrically.  Controls diabetes with oral diabetic medications.  Checks blood sugars daily.  Averages around 130s to 160s.  Denies any numbness or tingling to feet.  Checks feet occasionally.  Does not have orthotics.      TOBACCO USE: Half pack per day    Patient's problem  list, allergies, and current medications reviewed and updated in Epic    Interval History:  6/25/2024 initial provider Clinic visit with Sally MARLOW, FNP-BC, CWON, CFDAVID.  Pt denies fevers, chills, nausea, vomiting.  Blood sugar 130 today.  Reports no sleep for the last 2 days secondary to pain from his left calf.    7/22/2024: Clinic visit with Doug Bond MD. Patient reports frustration over chronic pain and continuance of wounds. His wounds have not made improvement. He reports that he did not tolerate debridement last week. He has areas of painful varicose veins medial aspect of leg. He was counseled extensively on suspected venous insufficiency, etiology of disease, and options for treatment. We discussed importance of compression and that pain associated with compression use should improve as edema improves. We also discussed the possibility of being evaluated for venous ablation, he would like to pursue if candidate. Will place referral to [facility] vein clinic.    7/29/2024: Clinic visit with Doug Bond MD. Patient reports doing ok. Reports pain improved with wearing compression. One of the previously stable eschars to left posterior leg fell off this week. We discussed increasing compression today. We discussed Unna boot, he declined using and would prefer to wear compression socks. Measured and given information on purchasing compression socks. Recommend he bring compression socks to next clinic visit. He has not received appointment from [facility] vein clinic, given number to make appointment.    8/6/2024: Clinic visit with Doug Bond MD. Patient reports feeling ok. Eschar right leg fell off few days ago. Appears to be starting new small satellite wound left posterior leg. He reports that he has appointment with [facility] on 9/9. Patient reports that compression socks have been ordered, but have not been delivered yet. He would like to avoid multilayer wrap and use tubigrip until  compression socks are delivered.    8/26/2024: Clinic visit with Doug Bond MD. Patient reports doing ok. He denies any acute issue. Slightly increased drainage this week. He ordered compression socks, he never received and possibly stolen after delivery. Given information for [facility], which I believe carry compression socks. Patient does not want multilayer wrap. Presented without compression today, reports that he has been wearing every day but did not wear for appointment today. Patient will try to cut down smoking.    10/14/2024 : Clinic visit with ELE Colin, RAMIRO, CWOCN, CFCN.   Patient presents today with layer of slough and eschar to left posterior calf wound.  He is not wearing compression stockings today, but states that he normally does.  His right lower extremity wound is resolved.  He has been seen at [email or web address] vein clinic, ablation procedure recommended, awaiting insurance authorization.  He reports his blood sugar today was 139.  He continues to smoke 1/2 pack/day.    12/23/2024: Clinic visit with Doug Bond MD. Patient reports doing well, reports pain has improved since last seen. Wounds smaller. He does not have compression socks on today, he emphatically promises that he has been wearing daily. Reinforced importance of compression. Patient was seen by [email or web address] vein clinic, reports they are still waiting on insurance authorization for ablations.    3/10/25: Clinic visit with Sally MARLOW, RAMIRO, CWON, CFCN.  Pt denies fevers, chills, nausea, vomiting. BS last night 88.  Has not checked yet today.  Smoking 7 cig/day. F/u with [email or web address] vein and unfortunately insurance will not cover the procedures.  Reports that he puts on his compression socks when he gets home and wears them all the time except when he is sleeping.  Advised patient to wear his compression socks to clinic.  He believes they are about 4 months old.  He denied Tubigrips at today's visit.  Left  medial lower leg ulcer no longer crusted, left posterior calf ulcer slightly increased.  Supplies ordered for patient.  He is following up monthly.    REVIEW OF SYSTEMS:   Unchanged from previous wound clinic assessment on 12/23./2024, except as noted in interval history above    PHYSICAL EXAMINATION:   There were no vitals taken for this visit.    Physical Exam  Vitals reviewed.   Cardiovascular:      Rate and Rhythm: Normal rate.      Pulses: Normal pulses.      Comments: Right palpable DP and PT.  With Doppler biphasic tones noted to PT/DP  Faintly palpable left DP.  Absent PT brisk tones noted to PT/DP  Pulmonary:      Effort: Pulmonary effort is normal.   Abdominal:      Palpations: Abdomen is soft.   Musculoskeletal:      Right lower leg: Edema present.      Left lower leg: Edema present.      Comments: mycotic toenails  +2 nonpitting edema BLE   Skin:     General: Skin is warm and dry.      Comments: Right lateral calf: Resolved    Left lateral posterior/distal calf wound: Wound area slightly increased, thick loose slough moderate serosanguineous drainage. No evidence of infection.    Left medial lower leg: Scab no longer in place, adherent slough with purple discoloration to periwound, no evidence of infection, tenderness with light palpation and during debridement, moderate serosanguineous drainage   Neurological:      Mental Status: He is alert and oriented to person, place, and time.      Comments: With monofilament sensed 10/10 to both feet   Psychiatric:         Mood and Affect: Affect normal.         Cognition and Memory: Memory normal.     Monofilament testing with a 10 gram force: sensation intact: intact bilaterally  Visual Inspection: Feet without maceration, ulcers, fissures.  Pedal pulses: intact bilaterally      WOUND ASSESSMENT  Wound 12/04/24 Tibia Posterior;Lateral Left (Active)   Wound Image    03/10/25 1100   Site Assessment Pink;Yellow 03/10/25 1100   Periwound Assessment Scar  tissue;Edema;Hemosiderin Staining 03/10/25 1100   Margins Attached edges 03/10/25 1100   Closure Secondary intention 12/16/24 1400   Drainage Amount Moderate 03/10/25 1100   Drainage Description Serosanguineous 03/10/25 1100   Treatments Cleansed;Topical Lidocaine;Provider debridement 03/10/25 1100   Wound Cleansing Hypochlorus Acid 03/10/25 1100   Periwound Protectant No-sting Skin Prep;Skin Moisturizer 03/10/25 1100   Dressing Status Old drainage 03/10/25 1100   Dressing Changed New 03/10/25 1100   Dressing Cleansing/Solutions Not Applicable 03/10/25 1100   Dressing Options Hydrofera Blue Ready;Transparent Film 03/10/25 1100   Dressing Change/Treatment Frequency Weekly, and As Needed 03/10/25 1100   Wound Team Following Monthly 03/10/25 1100   Wound Length (cm) 0.5 cm 03/10/25 1100   Wound Width (cm) 0.9 cm 03/10/25 1100   Wound Depth (cm) 0.2 cm 03/10/25 1100   Wound Surface Area (cm^2) 0.45 cm^2 03/10/25 1100   Wound Volume (cm^3) 0.09 cm^3 03/10/25 1100   Post-Procedure Length (cm) 0.5 cm 03/10/25 1100   Post-Procedure Width (cm) 1 cm 03/10/25 1100   Post-Procedure Depth (cm) 0.2 cm 03/10/25 1100   Post-Procedure Surface Area (cm^2) 0.5 cm^2 03/10/25 1100   Post-Procedure Volume (cm^3) 0.1 cm^3 03/10/25 1100   Wound Healing % -900 03/10/25 1100   Tunneling (cm) 0 cm 03/10/25 1100   Undermining (cm) 0 cm 03/10/25 1100   Wound Odor None 03/10/25 1100   Exposed Structures None 03/10/25 1100   Number of days: 96       Wound 03/10/25 Venous Ulcer Leg Distal Left LLE medial (Active)   Wound Image    03/10/25 1100   Site Assessment Pink;Brown;Crusted 03/10/25 1100   Periwound Assessment Edema;Fragile;Hemosiderin Staining 03/10/25 1100   Margins Attached edges 03/10/25 1100   Drainage Amount Moderate 03/10/25 1100   Drainage Description Serosanguineous 03/10/25 1100   Treatments Cleansed;Topical Lidocaine;Provider debridement 03/10/25 1100   Wound Cleansing Hypochlorus Acid 03/10/25 1100   Periwound Protectant No-sting  Skin Prep;Skin Moisturizer 03/10/25 1100   Dressing Status Old drainage 03/10/25 1100   Dressing Changed New 03/10/25 1100   Dressing Cleansing/Solutions Not Applicable 03/10/25 1100   Dressing Options Hydrofera Blue Ready;Transparent Film 03/10/25 1100   Dressing Change/Treatment Frequency Every 72 hrs, and As Needed 03/10/25 1100   Wound Team Following Monthly 03/10/25 1100   Wound Length (cm) 0.3 cm 03/10/25 1100   Wound Width (cm) 0.5 cm 03/10/25 1100   Wound Depth (cm) 0.1 cm 03/10/25 1100   Wound Surface Area (cm^2) 0.15 cm^2 03/10/25 1100   Wound Volume (cm^3) 0.015 cm^3 03/10/25 1100   Post-Procedure Length (cm) 0.4 cm 03/10/25 1100   Post-Procedure Width (cm) 0.5 cm 03/10/25 1100   Post-Procedure Depth (cm) 0.2 cm 03/10/25 1100   Post-Procedure Surface Area (cm^2) 0.2 cm^2 03/10/25 1100   Post-Procedure Volume (cm^3) 0.04 cm^3 03/10/25 1100   Tunneling (cm) 0 cm 03/10/25 1100   Undermining (cm) 0 cm 03/10/25 1100   Wound Odor None 03/10/25 1100   Exposed Structures None 03/10/25 1100   Number of days: 0       PROCEDURE: Excisional debridement of left posterior/distal leg wound and left medial lower leg  -2% viscous lidocaine applied topically to wound bed for approximately 5 minutes prior to debridement  --Curette then used to debride remaining slough from wound bed.  Excisional debridement was performed to remove devitalized tissue until healthy, bleeding tissue was visualized.   Entire surface of wound, 0.7 cm² debrided.  Tissue debrided into the subcutaneous layer.    -Bleeding controlled with manual pressure.    -Wound care completed by wound RN, refer to flowsheet  -Patient tolerated the procedure well, without c/o pain or discomfort.    Pertinent Labs and Diagnostics:    Labs:   A1c:   Lab Results   Component Value Date/Time    HBA1C 8.4 (H) 09/28/2021 10:21 AM            IMAGING: None    VASCULAR STUDIES:   No results found.    LAST  WOUND CULTURE:  DATE :    Lab Results   Component Value Date/Time     CULTRSULT  05/10/2024 11:10 PM     Usual urogenital portillo >100,000 cfu/mL  3 or more organisms isolated, culture of doubtful  significance, please recollect.                ASSESSMENT AND PLAN:     1. Chronic ulcer of left lower leg ([facility])  2. Non-pressure chronic ulcer of other part of right lower leg with other specified severity ([facility])  Left leg ulcer started per patient February 2024.  Right leg ulcer noted by patient mid May 2024    3/10/2025: Left posterior distal lower leg ulcer increased slightly, thick slough.  Left medial lower leg ulcer no longer scabbed, adherent slough present  - Excisional debridement of distal wound was performed in clinic, medically necessary to promote wound healing.  - Patient reports he has been wearing compression socks daily.  See below for further detail.  - He states that he only removed them today because he was coming in for wound care.  - Patient to return to clinic every 4 weeks for assessment and debridement  - Patient to change dressing two times per week and as needed  - He has been seen at [facility] vein clinic, and ablation procedure recommended.  However insurance denied procedure.    -Initiate Hydrofera Blue  -Patient showers daily.  Reports that he is purchasing waterproof dressings over-the-counter.  Discussed different waterproof dressings including cast cover.  Patient declines cast cover.  Initiate Tegaderm to allow patient to shower  - Supplies ordered through [facility]  - Declined Tubigr's    Wound care: Hydrofera Blue, Tegaderm, own compression socks    3. Type 2 diabetes mellitus without complication, without long-term current use of insulin ([facility])    3/10/2025: Patient reports his blood sugar has been fairly well controlled.  Reports today's blood sugar 88.  - Continue to keep blood sugars less than 140 for improved wound healing  - Patient sensate to both feet.  Sense 10 out of 10 with monofilament  - Discussed follow-up with podiatrist for routine foot nail care  as his toenails are thick and mycotic.  Patient declined referral to podiatry    4. Venous insufficiency of both lower extremities  5. Varicose Veins of bilateral lower extremities with pain    3/10/2025  - Patient has been resistant to wearing compression due to discomfort in past.  - Previously counseled extensively on etiology of disease and importance of compression.  -Compression socks were provided to him twice with vein clinic, see above  -- Patient has painful varicose veins medial leg that are not associated with wound.  Has been seen at [facility] vein clinic, see above  -Likely need for lifelong compression of lower legs   -Elevate legs above the level of the heart periodically throughout the day.  -Patient reports that he puts on his compression socks when he gets home and wears them all the time except when he is sleeping.  Advised patient to wear his compression socks to clinic.  He believes they are about 4 months old.  He denied Tubigrips at today's visit.  He does not know the strength of his compression garments.    5. Antiphospholipid syndrome ([facility])    3/10/2025 complicating factor.  Patient on Coumadin.  Follows with Coumadin clinic monthly.  Checks INR himself.    6. Pain associated with wound    3-10-25: Patient complained of significant pain with debridement today despite use of topical lidocaine  - Patient to follow-up with PCP regarding pain medications  -Recommend continuation of edema control, leg elevation  - May take Tylenol 500 mg every 4 hours as needed not to exceed 3000 g in 24 hours  7. Nicotine dependence with current use    3/10/2025: Continues to smoke cigarettes, now smoking 7 cigarettes a day.  Previously was smoking approximate 1/2 pack/day  -Reviewed adverse effects of nicotine on wound healing, and on overall health.  He is still not interested in quitting    PATIENT EDUCATION  - Etiology of  venous stasis ulceration discussed with patient  - Importance of managing edema for  healing of ulcer, and for prevention of new ulcer development  -Need for lifelong compression of lower legs   -Elevate legs above the level of the heart periodically throughout the day.  - Importance of adequate nutrition for wound healing  -Advised to go to ER for any increased redness, swelling, drainage or odor, or if patient develops fever, chills, nausea or vomiting.    My total time spent caring for the patient on the day of the encounter was 20 minutes, reviewing history, assessment, counseling and education, and coordination of care.  This does not include time spent on separately billable procedures/tests.      Please note that this note may have been created using voice recognition software. I have worked with technical experts from [employer] to optimize the interface.  I have made every reasonable attempt to correct obvious errors, but there may be errors of grammar and possibly     N

## 2025-03-10 NOTE — PATIENT INSTRUCTIONS
Should you experience any significant changes in your wound(s) such as infection (redness, swelling, localized heat, increased pain, fever >101 F, chills) or have any questions regarding your home care instructions, please contact the wound center [phone]. If after hours, contact your primary care physician or go the hospital emergency room.  Keep dressing clean and dry and cover while bathing. Only change dressing if over saturated, soiled, or its falling off.     Reviewed POC, importance of keeping the secondary dressing dry and intact, nutrition for wound healing, s/s of complications/infection, when to notify MD/go to ER.  Pt verbalized understanding to all.

## 2025-03-10 NOTE — WOUND TEAM
Pt arrived without compression. He stated that he normally wears them but he knew he was coming into the clinic today so he decided not to. Education done regarding importance of compression and pt agreed to wear his compression stockings on next visit to the wound clinic to improve wound healing and so we can assess if it's the correct level of compression.    Wound care supply order sent to [facility] via online portal. Provider note uploaded on the portal with facesheet.

## 2025-04-07 ENCOUNTER — OFFICE VISIT (OUTPATIENT)
Dept: WOUND CARE | Facility: MEDICAL CENTER | Age: 40
End: 2025-04-07
Attending: NURSE PRACTITIONER
Payer: COMMERCIAL

## 2025-04-07 DIAGNOSIS — L08.9 WOUND INFECTION: Primary | ICD-10-CM

## 2025-04-07 DIAGNOSIS — I83.813 VARICOSE VEINS OF BILATERAL LOWER EXTREMITIES WITH PAIN: ICD-10-CM

## 2025-04-07 DIAGNOSIS — R52 PAIN ASSOCIATED WITH WOUND: ICD-10-CM

## 2025-04-07 DIAGNOSIS — F17.200 NICOTINE DEPENDENCE WITH CURRENT USE: ICD-10-CM

## 2025-04-07 DIAGNOSIS — T14.8XXA PAIN ASSOCIATED WITH WOUND: ICD-10-CM

## 2025-04-07 DIAGNOSIS — E11.9 TYPE 2 DIABETES MELLITUS WITHOUT COMPLICATION, WITHOUT LONG-TERM CURRENT USE OF INSULIN (HCC): ICD-10-CM

## 2025-04-07 DIAGNOSIS — L97.818 NON-PRESSURE CHRONIC ULCER OF OTHER PART OF RIGHT LOWER LEG WITH OTHER SPECIFIED SEVERITY (HCC): ICD-10-CM

## 2025-04-07 DIAGNOSIS — L97.929: ICD-10-CM

## 2025-04-07 DIAGNOSIS — D68.61 ANTIPHOSPHOLIPID SYNDROME (HCC): ICD-10-CM

## 2025-04-07 DIAGNOSIS — T14.8XXA WOUND INFECTION: Primary | ICD-10-CM

## 2025-04-07 DIAGNOSIS — I87.2 VENOUS INSUFFICIENCY OF BOTH LOWER EXTREMITIES: ICD-10-CM

## 2025-04-07 LAB
GRAM STN SPEC: NORMAL
SIGNIFICANT IND 70042: NORMAL
SITE SITE: NORMAL
SOURCE SOURCE: NORMAL

## 2025-04-07 PROCEDURE — 87077 CULTURE AEROBIC IDENTIFY: CPT | Mod: 91

## 2025-04-07 PROCEDURE — 99213 OFFICE O/P EST LOW 20 MIN: CPT

## 2025-04-07 PROCEDURE — 87205 SMEAR GRAM STAIN: CPT

## 2025-04-07 PROCEDURE — 87186 SC STD MICRODIL/AGAR DIL: CPT

## 2025-04-07 PROCEDURE — 87070 CULTURE OTHR SPECIMN AEROBIC: CPT

## 2025-04-07 PROCEDURE — 11042 DBRDMT SUBQ TIS 1ST 20SQCM/<: CPT

## 2025-04-07 PROCEDURE — 11042 DBRDMT SUBQ TIS 1ST 20SQCM/<: CPT | Performed by: NURSE PRACTITIONER

## 2025-04-07 PROCEDURE — 99213 OFFICE O/P EST LOW 20 MIN: CPT | Mod: 25 | Performed by: NURSE PRACTITIONER

## 2025-04-07 NOTE — PATIENT INSTRUCTIONS
-Keep your wound dressing clean, dry, and intact. Change dressing if it's over saturated, soiled or falls off.     -Change dressing every ~3 days: Remove dressings. Clean wound with normal saline or wound cleanser. Pat dry with dry gauze or clean washcloth. Apply skin prep to periwound. Apply Hydrofera Blue Ready to wound bed as directed by provider secured with bandage.    -Should you experience any significant changes in your wound(s), such as infection (redness, swelling, localized heat, increased pain, fever > 101 F, chills) or have any questions regarding your home care instructions, please contact the wound center at [phone]. If after hours, contact your primary care physician or go to the hospital emergency room.     If you are admitted to any hospital, you will need a new referral to come back to the wound clinic and any scheduled appointments that you already have, may be cancelled.

## 2025-04-07 NOTE — PROGRESS NOTES
Provider Encounter- Lower Extremity Ulcer      HISTORY OF PRESENT ILLNESS  Wound History:    START OF CARE IN CLINIC: 5/6/2024    REFERRING PROVIDER:   Alistair MARLOW     WOUND ETIOLOGY: venous   LOCATION: L posterior lateral calf-resolved     Right lateral calf-resolved     Left posterior medial calf-new 4/7/25     Left medial lower leg-new 3/10/2025     HISTORY: Patient developed an ulcer to his left posterior lateral calf in February 2024.  Denies any injury.  Does not know how the ulcer started.  He also developed an ulcer to his right lateral calf.  Does not recall when or how this ulcer started.  Per chart review, ulcer was noted at his wound care appointment on 5/20/2024 that patient reported was new that day.  He has been performing  self wound care for several months including Vaseline, Neosporin with Band-Aids until he followed up with his PCP and was referred to wound care clinic treatment.  The ulcer formed an eschar.  Approximately 6/23/2024, the eschar fell off, causing increased pain and tenderness to patient's leg.  Since the ulcer has occurred, patient has received a prescription for Cipro in beginning of May 2024.  He was admitted from 5/11/2024 - 5/13/2024 for sepsis and elevated liver enzymes.  Received IV antibiotics and discharged home on Augmentin and doxycycline.  Patient completed antibiotics.      Pertinent Medical History: Acute kidney injury, antiphospholipid syndrome, JAYLIN, CVA, morbid obesity, hypertension, nonalcoholic fatty liver disease, type 2 diabetes without long-term insulin   ETIOLOGY HISTORY: Diagnosed: Unknown. Vascular Surgeon: None. Previous vascular procedures none. Compression none. Varicose Veins: Yes  Diagnosed with diabetes greater than 1 year.  Patient does not recall when he was diagnosed eccentrically.  Controls diabetes with oral diabetic medications.  Checks blood sugars daily.  Averages around 130s to 160s.  Denies any numbness or tingling to feet.  Checks  feet occasionally.  Does not have orthotics.      TOBACCO USE: Half pack per day    Patient's problem list, allergies, and current medications reviewed and updated in Epic    Interval History:  6/25/2024 initial provider Clinic visit with GARLAND Zazueta-BC, CWON, CFDAVID.  Pt denies fevers, chills, nausea, vomiting.  Blood sugar 130 today.  Reports no sleep for the last 2 days secondary to pain from his left calf.    7/22/2024: Clinic visit with Doug Bond MD. Patient reports frustration over chronic pain and continuance of wounds. His wounds have not made improvement. He reports that he did not tolerate debridement last week. He has areas of painful varicose veins medial aspect of leg. He was counseled extensively on suspected venous insufficiency, etiology of disease, and options for treatment. We discussed importance of compression and that pain associated with compression use should improve as edema improves. We also discussed the possibility of being evaluated for venous ablation, he would like to pursue if candidate. Will place referral to [facility] vein clinic.    7/29/2024: Clinic visit with Doug Bond MD. Patient reports doing ok. Reports pain improved with wearing compression. One of the previously stable eschars to left posterior leg fell off this week. We discussed increasing compression today. We discussed Unna boot, he declined using and would prefer to wear compression socks. Measured and given information on purchasing compression socks. Recommend he bring compression socks to next clinic visit. He has not received appointment from [facility] vein clinic, given number to make appointment.    8/6/2024: Clinic visit with Doug Bond MD. Patient reports feeling ok. Eschar right leg fell off few days ago. Appears to be starting new small satellite wound left posterior leg. He reports that he has appointment with [facility] on 9/9. Patient reports that compression socks have been  ordered, but have not been delivered yet. He would like to avoid multilayer wrap and use tubigrip until compression socks are delivered.    8/26/2024: Clinic visit with Doug Bond MD. Patient reports doing ok. He denies any acute issue. Slightly increased drainage this week. He ordered compression socks, he never received and possibly stolen after delivery. Given information for [facility], which I believe carry compression socks. Patient does not want multilayer wrap. Presented without compression today, reports that he has been wearing every day but did not wear for appointment today. Patient will try to cut down smoking.    10/14/2024 : Clinic visit with ELE Colin, GARLAND-BC, CWOCN, CFCN.   Patient presents today with layer of slough and eschar to left posterior calf wound.  He is not wearing compression stockings today, but states that he normally does.  His right lower extremity wound is resolved.  He has been seen at [facility] vein clinic, ablation procedure recommended, awaiting insurance authorization.  He reports his blood sugar today was 139.  He continues to smoke 1/2 pack/day.    12/23/2024: Clinic visit with Doug Bond MD. Patient reports doing well, reports pain has improved since last seen. Wounds smaller. He does not have compression socks on today, he emphatically promises that he has been wearing daily. Reinforced importance of compression. Patient was seen by [facility] vein clinic, reports they are still waiting on insurance authorization for ablations.    3/10/25: Clinic visit with Sally MARLOW, RAMIRO, CWON, CFCN.  Pt denies fevers, chills, nausea, vomiting. BS last night 88.  Has not checked yet today.  Smoking 7 cig/day. F/u with [facility] vein and unfortunately insurance will not cover the procedures.  Reports that he puts on his compression socks when he gets home and wears them all the time except when he is sleeping.  Advised patient to wear his compression  socks to clinic.  He believes they are about 4 months old.  He denied Tubigrips at today's visit.  Left medial lower leg ulcer no longer crusted, left posterior calf ulcer slightly increased.  Supplies ordered for patient.  He is following up monthly.    4/7/25: Clinic visit with Sally MARLOW, GARLAND-BC, SAHRA, CFDAVID.  Pt denies fevers, chills, nausea, vomiting. Wound cx collected of left posterior medial lower leg new ulcer.  Initiated on Augmentin.  He first noticed the new ulcer 4/3/2025 after he accidentally scratched his leg.  Tissue around it is denuded and slightly excoriated.  He did heal his left posterior distal lower leg ulcer.  The left medial lower leg ulcers unchanged.  He did bring in his personal compression socks from [employer] today.      REVIEW OF SYSTEMS:   Unchanged from previous wound clinic assessment on 3/10/2025 except as noted in interval history above    PHYSICAL EXAMINATION:   There were no vitals taken for this visit.    Physical Exam  Vitals reviewed.   Cardiovascular:      Rate and Rhythm: Normal rate.      Pulses: Normal pulses.      Comments: Right palpable DP and PT.  With Doppler biphasic tones noted to PT/DP  Faintly palpable left DP.  Absent PT brisk tones noted to PT/DP  Pulmonary:      Effort: Pulmonary effort is normal.   Abdominal:      Palpations: Abdomen is soft.   Musculoskeletal:      Right lower leg: Edema present.      Left lower leg: Edema present.      Comments: mycotic toenails  +2 nonpitting edema BLE   Skin:     General: Skin is warm and dry.      Comments: Left posterior medial lower leg: New, full-thickness, denuded and excoriated periwound, increased erythema, warmth, edema, red moist granular tissue to wound bed    Left medial lower leg: Scattered adherent slough with decreased purple discoloration to periwound, no evidence of infection, tenderness with light palpation and during debridement, minimal serosanguineous drainage        Right lateral calf:  Resolved    Left lateral posterior/distal calf wound: Resolved             Neurological:      Mental Status: He is alert and oriented to person, place, and time.      Comments: With monofilament sensed 10/10 to both feet   Psychiatric:         Mood and Affect: Affect normal.         Cognition and Memory: Memory normal.     Monofilament testing with a 10 gram force: sensation intact: intact bilaterally  Visual Inspection: Feet without maceration, ulcers, fissures.  Pedal pulses: intact bilaterally      WOUND ASSESSMENT  Wound 03/10/25 Venous Ulcer Leg Distal Left LLE medial (Active)   Wound Image    04/07/25 1430   Site Assessment Pink;Yellow 04/07/25 1430   Periwound Assessment Crusted;Edema;Scar tissue 04/07/25 1430   Margins Attached edges 04/07/25 1430   Drainage Amount Small 04/07/25 1430   Drainage Description Serosanguineous 04/07/25 1430   Treatments Cleansed;Topical Lidocaine;Provider debridement;Site care 04/07/25 1430   Wound Cleansing Hypochlorus Acid 04/07/25 1430   Periwound Protectant No-sting Skin Prep 04/07/25 1430   Dressing Status Old drainage 03/10/25 1100   Dressing Changed Changed 04/07/25 1430   Dressing Cleansing/Solutions Not Applicable 04/07/25 1430   Dressing Options Hydrofera Blue Ready;Silicone Adhesive Foam 04/07/25 1430   Dressing Change/Treatment Frequency Every 72 hrs, and As Needed 04/07/25 1430   Wound Team Following Bi-Monthly 04/07/25 1430   Non-staged Wound Description Full thickness 04/07/25 1430   Wound Length (cm) 0.4 cm 04/07/25 1430   Wound Width (cm) 0.5 cm 04/07/25 1430   Wound Depth (cm) 0.1 cm 04/07/25 1430   Wound Surface Area (cm^2) 0.2 cm^2 04/07/25 1430   Wound Volume (cm^3) 0.02 cm^3 04/07/25 1430   Post-Procedure Length (cm) 0.4 cm 04/07/25 1430   Post-Procedure Width (cm) 0.7 cm 04/07/25 1430   Post-Procedure Depth (cm) 0.2 cm 04/07/25 1430   Post-Procedure Surface Area (cm^2) 0.28 cm^2 04/07/25 1430   Post-Procedure Volume (cm^3) 0.056 cm^3 04/07/25 1430   Wound  Healing % -33 04/07/25 1430   Tunneling (cm) 0 cm 04/07/25 1430   Undermining (cm) 0 cm 04/07/25 1430   Wound Odor None 04/07/25 1430   Exposed Structures None 04/07/25 1430   Number of days: 28       Wound 04/07/25 Full Thickness Wound Leg Medial;Posterior;Lower Left (Active)   Wound Image   04/07/25 1430   Site Assessment Red 04/07/25 1430   Periwound Assessment Blanchable erythema;Edema 04/07/25 1430   Margins Attached edges 04/07/25 1430   Drainage Amount Moderate 04/07/25 1430   Drainage Description Sanguineous 04/07/25 1430   Treatments Cleansed;Wound culture;Site care 04/07/25 1430   Wound Cleansing Normal Saline Irrigation 04/07/25 1430   Periwound Protectant No-sting Skin Prep 04/07/25 1430   Dressing Status Other (Comment) 04/07/25 1430   Dressing Changed New 04/07/25 1430   Dressing Cleansing/Solutions Not Applicable 04/07/25 1430   Dressing Options Hydrofera Blue Ready;Silicone Adhesive Foam 04/07/25 1430   Dressing Change/Treatment Frequency Every 72 hrs, and As Needed 04/07/25 1430   Wound Team Following Bi-Monthly 04/07/25 1430   Non-staged Wound Description Full thickness 04/07/25 1430   Wound Length (cm) 1.2 cm 04/07/25 1430   Wound Width (cm) 1 cm 04/07/25 1430   Wound Depth (cm) 0.1 cm 04/07/25 1430   Wound Surface Area (cm^2) 1.2 cm^2 04/07/25 1430   Wound Volume (cm^3) 0.12 cm^3 04/07/25 1430   Tunneling (cm) 0 cm 04/07/25 1430   Undermining (cm) 0 cm 04/07/25 1430   Wound Odor None 04/07/25 1430   Exposed Structures None 04/07/25 1430   Number of days: 0       PROCEDURE: Excisional debridement of  left medial lower leg  -2% viscous lidocaine applied topically to wound bed for approximately 5 minutes prior to debridement  --Curette then used to debride remaining slough from wound bed.  Excisional debridement was performed to remove devitalized tissue until healthy, bleeding tissue was visualized.   Entire surface of wound, 0.28 cm² debrided.  Tissue debrided into the subcutaneous layer.     -Bleeding controlled with manual pressure.    -Wound care completed by wound RN, refer to flowsheet  -Patient tolerated the procedure well, without c/o pain or discomfort.    Pertinent Labs and Diagnostics:    Labs:   A1c:   Lab Results   Component Value Date/Time    HBA1C 8.4 (H) 09/28/2021 10:21 AM            IMAGING: None    VASCULAR STUDIES:   No results found.    LAST  WOUND CULTURE:  DATE :    Lab Results   Component Value Date/Time    CULTRSULT - 04/07/2025 02:53 PM              ASSESSMENT AND PLAN:     1. Chronic ulcer of left lower leg (HCC)  2. Non-pressure chronic ulcer of other part of right lower leg with other specified severity (HCC)  Left leg ulcer started per patient February 2024.  Right leg ulcer noted by patient mid May 2024    4/7/2025: Left posterior distal lower leg ulcer resolved.    Left medial lower leg ulcer unchanged scattered adherent slough present  New ulcer left posterior medial lower leg with denudation and cellulitis  - Excisional debridement of wound was performed in clinic, medically necessary to promote wound healing.  - Patient reports he has been wearing compression socks daily.  He brought his compression socks into his appointment today.  See below for further detail.    -Typically following up monthly however with new infection, patient to follow-up in 2 weeks.    - Patient to change dressing two times per week and as needed  - He has been seen at [facility] vein clinic, and ablation procedure recommended.  However insurance denied procedure.    - Continue Hydrofera Blue  -Patient showers daily.  Reports that he is purchasing waterproof dressings over-the-counter.  Discussed different waterproof dressings including cast cover.  Patient declines cast cover.  Initiated Tegaderm to allow patient to shower last visit.  He reports that Tegaderm is not as adherent and he is still using waterproof Band-Aids over the wound dressing..  - Supplies ordered through DME      Wound care:  Hydrofera Blue, adhesive foam, own compression socks    3. Type 2 diabetes mellitus without complication, without long-term current use of insulin ([facility])    4/7/2025: Patient reports his blood sugar has been fairly well controlled.  Reports today's blood sugar 115.  - Continue to keep blood sugars less than 140 for improved wound healing  - Patient sensate to both feet.  Sense 10 out of 10 with monofilament  - Discussed follow-up with podiatrist for routine foot nail care as his toenails are thick and mycotic.  Patient declined referral to podiatry    4. Venous insufficiency of both lower extremities  5. Varicose Veins of bilateral lower extremities with pain    4/7/2025  - Patient has been resistant to wearing compression due to discomfort in past.  - Previously counseled extensively on etiology of disease and importance of compression.  -Compression socks were provided to him twice with vein clinic, see above  -- Patient has painful varicose veins medial leg that are not associated with wound.  Has been seen at [facility] vein clinic, see above  -Likely need for lifelong compression of lower legs   -Elevate legs above the level of the heart periodically throughout the day.  -Patient reports that he puts on his compression socks when he gets home and wears them all the time except when he is sleeping.    -Patient brought sigvaris compression socks 20 to 30 mmHg with him to his appointment.  He reports they are 5 months old.  He only has 1 set that he is wearing every day.  Advised that he purchase new compression socks as current ones are beginning to wear down.        5. Antiphospholipid syndrome ([facility])    4/7/2025 complicating factor.  Patient on Coumadin.  Follows with Coumadin clinic monthly.  Checks INR himself.  Last Coumadin he reports was 2.8    6. Pain associated with wound    4/7/25: Patient complained of  pain with debridement today despite use of topical lidocaine  - Patient to follow-up with PCP regarding  pain medications  -Recommend continuation of edema control, leg elevation  - May take Tylenol 500 mg every 4 hours as needed not to exceed 3000 g in 24 hours      7. Nicotine dependence with current use    4/7/2025: Continues to smoke cigarettes, now smoking 6 cigarettes a day.  Previously was smoking approximate 7 pack/day  -Reviewed adverse effects of nicotine on wound healing, and on overall health.  He is interested in quitting.  Declines smoking cessation aid      8.  Wound infection    4/7/25: Presents with new ulceration to left posterior medial lower leg with denudation, excoriation, he has increased erythema, warmth, pain, edema  - Wound culture collected  - Initiated on Augmentin.  Follow results              PATIENT EDUCATION  - Etiology of  venous stasis ulceration discussed with patient  - Importance of managing edema for healing of ulcer, and for prevention of new ulcer development  -Need for lifelong compression of lower legs   -Elevate legs above the level of the heart periodically throughout the day.  - Importance of adequate nutrition for wound healing  -Advised to go to ER for any increased redness, swelling, drainage or odor, or if patient develops fever, chills, nausea or vomiting.    My total time spent caring for the patient on the day of the encounter was 20 minutes, reviewing history, assessment, counseling and education, and coordination of care.  This does not include time spent on separately billable procedures/tests.      Please note that this note may have been created using voice recognition software. I have worked with technical experts from [employer] to optimize the interface.  I have made every reasonable attempt to correct obvious errors, but there may be errors of grammar and possibly     N

## 2025-04-11 ENCOUNTER — DOCUMENTATION (OUTPATIENT)
Dept: WOUND CARE | Facility: MEDICAL CENTER | Age: 40
End: 2025-04-11
Payer: COMMERCIAL

## 2025-04-11 DIAGNOSIS — T14.8XXA WOUND INFECTION: ICD-10-CM

## 2025-04-11 DIAGNOSIS — L08.9 WOUND INFECTION: ICD-10-CM

## 2025-04-11 DIAGNOSIS — E11.9 TYPE 2 DIABETES MELLITUS WITHOUT COMPLICATION, WITHOUT LONG-TERM CURRENT USE OF INSULIN (HCC): ICD-10-CM

## 2025-04-11 LAB
BACTERIA WND AEROBE CULT: ABNORMAL
GRAM STN SPEC: ABNORMAL
SIGNIFICANT IND 70042: ABNORMAL
SITE SITE: ABNORMAL
SOURCE SOURCE: ABNORMAL

## 2025-04-11 RX ORDER — LEVOFLOXACIN 750 MG/1
750 TABLET, FILM COATED ORAL DAILY
Qty: 10 TABLET | Refills: 0 | Status: ON HOLD | OUTPATIENT
Start: 2025-04-11 | End: 2025-04-14

## 2025-04-11 NOTE — PROGRESS NOTES
Reviewed wound culture results.  Positive for Pseudomonas aeruginosa and MSSA.  Discussed with microbiology.  Levofloxacin is also sensitive to MSSA.  Patient currently on Augmentin.  Discontinue Augmentin.  Initiated levofloxacin.  Rx sent to patient pharmacy.  Contacted patient.  Left voice message.  - Reviewed photos, labs, including renal function, EKG.  Discussed case with ID MIKE

## 2025-04-13 ENCOUNTER — HOSPITAL ENCOUNTER (INPATIENT)
Facility: MEDICAL CENTER | Age: 40
LOS: 1 days | DRG: 603 | End: 2025-04-14
Attending: EMERGENCY MEDICINE | Admitting: STUDENT IN AN ORGANIZED HEALTH CARE EDUCATION/TRAINING PROGRAM
Payer: COMMERCIAL

## 2025-04-13 DIAGNOSIS — B35.1 ONYCHOMYCOSIS: Primary | ICD-10-CM

## 2025-04-13 DIAGNOSIS — L27.0 DRUG RASH: ICD-10-CM

## 2025-04-13 DIAGNOSIS — L03.90 SEPSIS DUE TO CELLULITIS (HCC): ICD-10-CM

## 2025-04-13 DIAGNOSIS — L03.116 LEFT LEG CELLULITIS: ICD-10-CM

## 2025-04-13 DIAGNOSIS — A41.9 SEPSIS DUE TO CELLULITIS (HCC): ICD-10-CM

## 2025-04-13 DIAGNOSIS — Z72.0 TOBACCO CONSUMPTION: ICD-10-CM

## 2025-04-13 PROBLEM — Z86.73 HISTORY OF CVA (CEREBROVASCULAR ACCIDENT): Status: ACTIVE | Noted: 2025-04-13

## 2025-04-13 PROBLEM — E66.813 CLASS 3 SEVERE OBESITY DUE TO EXCESS CALORIES WITH SERIOUS COMORBIDITY AND BODY MASS INDEX (BMI) OF 50.0 TO 59.9 IN ADULT: Status: ACTIVE | Noted: 2025-04-13

## 2025-04-13 LAB
ALBUMIN SERPL BCP-MCNC: 3.9 G/DL (ref 3.2–4.9)
ALBUMIN/GLOB SERPL: 1 G/DL
ALP SERPL-CCNC: 69 U/L (ref 30–99)
ALT SERPL-CCNC: 38 U/L (ref 2–50)
ANION GAP SERPL CALC-SCNC: 12 MMOL/L (ref 7–16)
AST SERPL-CCNC: 32 U/L (ref 12–45)
BASOPHILS # BLD AUTO: 0.6 % (ref 0–1.8)
BASOPHILS # BLD: 0.06 K/UL (ref 0–0.12)
BILIRUB SERPL-MCNC: 2.4 MG/DL (ref 0.1–1.5)
BUN SERPL-MCNC: 20 MG/DL (ref 8–22)
CALCIUM ALBUM COR SERPL-MCNC: 9.2 MG/DL (ref 8.5–10.5)
CALCIUM SERPL-MCNC: 9.1 MG/DL (ref 8.5–10.5)
CHLORIDE SERPL-SCNC: 101 MMOL/L (ref 96–112)
CO2 SERPL-SCNC: 22 MMOL/L (ref 20–33)
CREAT SERPL-MCNC: 1.44 MG/DL (ref 0.5–1.4)
EKG IMPRESSION: NORMAL
EOSINOPHIL # BLD AUTO: 0.06 K/UL (ref 0–0.51)
EOSINOPHIL NFR BLD: 0.6 % (ref 0–6.9)
ERYTHROCYTE [DISTWIDTH] IN BLOOD BY AUTOMATED COUNT: 40.8 FL (ref 35.9–50)
GFR SERPLBLD CREATININE-BSD FMLA CKD-EPI: 62 ML/MIN/1.73 M 2
GLOBULIN SER CALC-MCNC: 3.8 G/DL (ref 1.9–3.5)
GLUCOSE BLD STRIP.AUTO-MCNC: 142 MG/DL (ref 65–99)
GLUCOSE BLD STRIP.AUTO-MCNC: 157 MG/DL (ref 65–99)
GLUCOSE BLD STRIP.AUTO-MCNC: 162 MG/DL (ref 65–99)
GLUCOSE BLD STRIP.AUTO-MCNC: 192 MG/DL (ref 65–99)
GLUCOSE SERPL-MCNC: 179 MG/DL (ref 65–99)
HCT VFR BLD AUTO: 49 % (ref 42–52)
HGB BLD-MCNC: 16.2 G/DL (ref 14–18)
IMM GRANULOCYTES # BLD AUTO: 0.07 K/UL (ref 0–0.11)
IMM GRANULOCYTES NFR BLD AUTO: 0.7 % (ref 0–0.9)
INR PPP: 1.9 (ref 0.87–1.13)
LACTATE SERPL-SCNC: 1.8 MMOL/L (ref 0.5–2)
LACTATE SERPL-SCNC: 2.4 MMOL/L (ref 0.5–2)
LACTATE SERPL-SCNC: 2.7 MMOL/L (ref 0.5–2)
LYMPHOCYTES # BLD AUTO: 0.58 K/UL (ref 1–4.8)
LYMPHOCYTES NFR BLD: 6 % (ref 22–41)
MCH RBC QN AUTO: 28.2 PG (ref 27–33)
MCHC RBC AUTO-ENTMCNC: 33.1 G/DL (ref 32.3–36.5)
MCV RBC AUTO: 85.4 FL (ref 81.4–97.8)
MONOCYTES # BLD AUTO: 0.2 K/UL (ref 0–0.85)
MONOCYTES NFR BLD AUTO: 2.1 % (ref 0–13.4)
NEUTROPHILS # BLD AUTO: 8.68 K/UL (ref 1.82–7.42)
NEUTROPHILS NFR BLD: 90 % (ref 44–72)
NRBC # BLD AUTO: 0 K/UL
NRBC BLD-RTO: 0 /100 WBC (ref 0–0.2)
PLATELET # BLD AUTO: 241 K/UL (ref 164–446)
PMV BLD AUTO: 9.4 FL (ref 9–12.9)
POTASSIUM SERPL-SCNC: 3.9 MMOL/L (ref 3.6–5.5)
PROT SERPL-MCNC: 7.7 G/DL (ref 6–8.2)
PROTHROMBIN TIME: 21.9 SEC (ref 12–14.6)
RBC # BLD AUTO: 5.74 M/UL (ref 4.7–6.1)
SODIUM SERPL-SCNC: 135 MMOL/L (ref 135–145)
WBC # BLD AUTO: 9.7 K/UL (ref 4.8–10.8)

## 2025-04-13 PROCEDURE — 85610 PROTHROMBIN TIME: CPT

## 2025-04-13 PROCEDURE — 93010 ELECTROCARDIOGRAM REPORT: CPT | Performed by: INTERNAL MEDICINE

## 2025-04-13 PROCEDURE — 93005 ELECTROCARDIOGRAM TRACING: CPT | Mod: TC | Performed by: STUDENT IN AN ORGANIZED HEALTH CARE EDUCATION/TRAINING PROGRAM

## 2025-04-13 PROCEDURE — 700102 HCHG RX REV CODE 250 W/ 637 OVERRIDE(OP)

## 2025-04-13 PROCEDURE — 700102 HCHG RX REV CODE 250 W/ 637 OVERRIDE(OP): Performed by: STUDENT IN AN ORGANIZED HEALTH CARE EDUCATION/TRAINING PROGRAM

## 2025-04-13 PROCEDURE — A9270 NON-COVERED ITEM OR SERVICE: HCPCS | Performed by: STUDENT IN AN ORGANIZED HEALTH CARE EDUCATION/TRAINING PROGRAM

## 2025-04-13 PROCEDURE — 96365 THER/PROPH/DIAG IV INF INIT: CPT

## 2025-04-13 PROCEDURE — 700105 HCHG RX REV CODE 258

## 2025-04-13 PROCEDURE — 36415 COLL VENOUS BLD VENIPUNCTURE: CPT

## 2025-04-13 PROCEDURE — 700111 HCHG RX REV CODE 636 W/ 250 OVERRIDE (IP): Mod: JZ | Performed by: STUDENT IN AN ORGANIZED HEALTH CARE EDUCATION/TRAINING PROGRAM

## 2025-04-13 PROCEDURE — 700105 HCHG RX REV CODE 258: Performed by: EMERGENCY MEDICINE

## 2025-04-13 PROCEDURE — 700105 HCHG RX REV CODE 258: Performed by: STUDENT IN AN ORGANIZED HEALTH CARE EDUCATION/TRAINING PROGRAM

## 2025-04-13 PROCEDURE — A9270 NON-COVERED ITEM OR SERVICE: HCPCS

## 2025-04-13 PROCEDURE — 99223 1ST HOSP IP/OBS HIGH 75: CPT | Mod: 25,GC | Performed by: STUDENT IN AN ORGANIZED HEALTH CARE EDUCATION/TRAINING PROGRAM

## 2025-04-13 PROCEDURE — A9270 NON-COVERED ITEM OR SERVICE: HCPCS | Performed by: EMERGENCY MEDICINE

## 2025-04-13 PROCEDURE — 80053 COMPREHEN METABOLIC PANEL: CPT

## 2025-04-13 PROCEDURE — 87040 BLOOD CULTURE FOR BACTERIA: CPT | Mod: 91

## 2025-04-13 PROCEDURE — 82962 GLUCOSE BLOOD TEST: CPT | Mod: 91

## 2025-04-13 PROCEDURE — 83605 ASSAY OF LACTIC ACID: CPT | Mod: 91

## 2025-04-13 PROCEDURE — 99406 BEHAV CHNG SMOKING 3-10 MIN: CPT | Performed by: STUDENT IN AN ORGANIZED HEALTH CARE EDUCATION/TRAINING PROGRAM

## 2025-04-13 PROCEDURE — 85025 COMPLETE CBC W/AUTO DIFF WBC: CPT

## 2025-04-13 PROCEDURE — 700111 HCHG RX REV CODE 636 W/ 250 OVERRIDE (IP): Mod: JZ

## 2025-04-13 PROCEDURE — 700111 HCHG RX REV CODE 636 W/ 250 OVERRIDE (IP): Performed by: EMERGENCY MEDICINE

## 2025-04-13 PROCEDURE — 770006 HCHG ROOM/CARE - MED/SURG/GYN SEMI*

## 2025-04-13 PROCEDURE — 700102 HCHG RX REV CODE 250 W/ 637 OVERRIDE(OP): Performed by: EMERGENCY MEDICINE

## 2025-04-13 PROCEDURE — 99285 EMERGENCY DEPT VISIT HI MDM: CPT

## 2025-04-13 RX ORDER — SODIUM CHLORIDE, SODIUM LACTATE, POTASSIUM CHLORIDE, AND CALCIUM CHLORIDE .6; .31; .03; .02 G/100ML; G/100ML; G/100ML; G/100ML
30 INJECTION, SOLUTION INTRAVENOUS ONCE
Status: COMPLETED | OUTPATIENT
Start: 2025-04-13 | End: 2025-04-13

## 2025-04-13 RX ORDER — INSULIN LISPRO 100 [IU]/ML
2-9 INJECTION, SOLUTION INTRAVENOUS; SUBCUTANEOUS
Status: DISCONTINUED | OUTPATIENT
Start: 2025-04-13 | End: 2025-04-14 | Stop reason: HOSPADM

## 2025-04-13 RX ORDER — FAMOTIDINE 20 MG/1
20 TABLET, FILM COATED ORAL ONCE
Status: COMPLETED | OUTPATIENT
Start: 2025-04-13 | End: 2025-04-13

## 2025-04-13 RX ORDER — AMOXICILLIN 250 MG
2 CAPSULE ORAL EVERY EVENING
Status: DISCONTINUED | OUTPATIENT
Start: 2025-04-13 | End: 2025-04-14 | Stop reason: HOSPADM

## 2025-04-13 RX ORDER — SODIUM CHLORIDE, SODIUM LACTATE, POTASSIUM CHLORIDE, CALCIUM CHLORIDE 600; 310; 30; 20 MG/100ML; MG/100ML; MG/100ML; MG/100ML
INJECTION, SOLUTION INTRAVENOUS CONTINUOUS
Status: ACTIVE | OUTPATIENT
Start: 2025-04-13 | End: 2025-04-14

## 2025-04-13 RX ORDER — ATORVASTATIN CALCIUM 40 MG/1
40 TABLET, FILM COATED ORAL DAILY
Status: DISCONTINUED | OUTPATIENT
Start: 2025-04-13 | End: 2025-04-14 | Stop reason: HOSPADM

## 2025-04-13 RX ORDER — WARFARIN SODIUM 7.5 MG/1
7.5 TABLET ORAL ONCE
Status: COMPLETED | OUTPATIENT
Start: 2025-04-13 | End: 2025-04-13

## 2025-04-13 RX ORDER — DIPHENHYDRAMINE HCL 25 MG
50 TABLET ORAL ONCE
Status: COMPLETED | OUTPATIENT
Start: 2025-04-13 | End: 2025-04-13

## 2025-04-13 RX ORDER — NICOTINE 21 MG/24HR
14 PATCH, TRANSDERMAL 24 HOURS TRANSDERMAL
Status: DISCONTINUED | OUTPATIENT
Start: 2025-04-13 | End: 2025-04-14 | Stop reason: HOSPADM

## 2025-04-13 RX ORDER — CEPHALEXIN 500 MG/1
1000 CAPSULE ORAL EVERY 8 HOURS
Status: DISCONTINUED | OUTPATIENT
Start: 2025-04-13 | End: 2025-04-14

## 2025-04-13 RX ORDER — WARFARIN SODIUM 5 MG/1
5 TABLET ORAL DAILY
Status: DISCONTINUED | OUTPATIENT
Start: 2025-04-13 | End: 2025-04-13

## 2025-04-13 RX ORDER — DIPHENHYDRAMINE HCL 25 MG
25 TABLET ORAL EVERY 6 HOURS PRN
Status: DISCONTINUED | OUTPATIENT
Start: 2025-04-13 | End: 2025-04-14 | Stop reason: HOSPADM

## 2025-04-13 RX ORDER — HYDROCHLOROTHIAZIDE 25 MG/1
25 TABLET ORAL
Status: DISCONTINUED | OUTPATIENT
Start: 2025-04-13 | End: 2025-04-14 | Stop reason: HOSPADM

## 2025-04-13 RX ORDER — CIPROFLOXACIN 500 MG/1
750 TABLET, FILM COATED ORAL EVERY 12 HOURS
Status: DISCONTINUED | OUTPATIENT
Start: 2025-04-13 | End: 2025-04-14 | Stop reason: HOSPADM

## 2025-04-13 RX ORDER — ACETAMINOPHEN 325 MG/1
650 TABLET ORAL EVERY 6 HOURS PRN
Status: DISCONTINUED | OUTPATIENT
Start: 2025-04-13 | End: 2025-04-14 | Stop reason: HOSPADM

## 2025-04-13 RX ORDER — WARFARIN SODIUM 5 MG/1
5 TABLET ORAL DAILY
Status: DISCONTINUED | OUTPATIENT
Start: 2025-04-14 | End: 2025-04-14 | Stop reason: HOSPADM

## 2025-04-13 RX ORDER — SODIUM CHLORIDE, SODIUM LACTATE, POTASSIUM CHLORIDE, CALCIUM CHLORIDE 600; 310; 30; 20 MG/100ML; MG/100ML; MG/100ML; MG/100ML
INJECTION, SOLUTION INTRAVENOUS CONTINUOUS
Status: DISCONTINUED | OUTPATIENT
Start: 2025-04-13 | End: 2025-04-13

## 2025-04-13 RX ORDER — DEXTROSE MONOHYDRATE 25 G/50ML
25 INJECTION, SOLUTION INTRAVENOUS
Status: DISCONTINUED | OUTPATIENT
Start: 2025-04-13 | End: 2025-04-14 | Stop reason: HOSPADM

## 2025-04-13 RX ORDER — SODIUM CHLORIDE, SODIUM LACTATE, POTASSIUM CHLORIDE, AND CALCIUM CHLORIDE .6; .31; .03; .02 G/100ML; G/100ML; G/100ML; G/100ML
1000 INJECTION, SOLUTION INTRAVENOUS ONCE
Status: COMPLETED | OUTPATIENT
Start: 2025-04-13 | End: 2025-04-13

## 2025-04-13 RX ORDER — POLYETHYLENE GLYCOL 3350 17 G/17G
1 POWDER, FOR SOLUTION ORAL
Status: DISCONTINUED | OUTPATIENT
Start: 2025-04-13 | End: 2025-04-14 | Stop reason: HOSPADM

## 2025-04-13 RX ORDER — LISINOPRIL 20 MG/1
20 TABLET ORAL
Status: DISCONTINUED | OUTPATIENT
Start: 2025-04-13 | End: 2025-04-14 | Stop reason: HOSPADM

## 2025-04-13 RX ORDER — LISINOPRIL AND HYDROCHLOROTHIAZIDE 20; 25 MG/1; MG/1
1 TABLET ORAL DAILY
Status: DISCONTINUED | OUTPATIENT
Start: 2025-04-13 | End: 2025-04-13

## 2025-04-13 RX ORDER — TERBINAFINE HYDROCHLORIDE 250 MG/1
250 TABLET ORAL DAILY
Status: DISCONTINUED | OUTPATIENT
Start: 2025-04-13 | End: 2025-04-14 | Stop reason: HOSPADM

## 2025-04-13 RX ADMIN — FAMOTIDINE 20 MG: 20 TABLET, FILM COATED ORAL at 01:39

## 2025-04-13 RX ADMIN — INSULIN LISPRO 2 UNITS: 100 INJECTION, SOLUTION INTRAVENOUS; SUBCUTANEOUS at 12:03

## 2025-04-13 RX ADMIN — SODIUM CHLORIDE, POTASSIUM CHLORIDE, SODIUM LACTATE AND CALCIUM CHLORIDE: 600; 310; 30; 20 INJECTION, SOLUTION INTRAVENOUS at 11:08

## 2025-04-13 RX ADMIN — CIPROFLOXACIN HYDROCHLORIDE 750 MG: 500 TABLET, FILM COATED ORAL at 17:27

## 2025-04-13 RX ADMIN — PIPERACILLIN AND TAZOBACTAM 3.38 G: 3; .375 INJECTION, POWDER, FOR SOLUTION INTRAVENOUS at 06:23

## 2025-04-13 RX ADMIN — SENNOSIDES AND DOCUSATE SODIUM 2 TABLET: 50; 8.6 TABLET ORAL at 17:28

## 2025-04-13 RX ADMIN — PIPERACILLIN AND TAZOBACTAM 4.5 G: 4; .5 INJECTION, POWDER, LYOPHILIZED, FOR SOLUTION INTRAVENOUS; PARENTERAL at 03:59

## 2025-04-13 RX ADMIN — ATORVASTATIN CALCIUM 40 MG: 40 TABLET, FILM COATED ORAL at 06:17

## 2025-04-13 RX ADMIN — INSULIN LISPRO 2 UNITS: 100 INJECTION, SOLUTION INTRAVENOUS; SUBCUTANEOUS at 17:33

## 2025-04-13 RX ADMIN — DIPHENHYDRAMINE HYDROCHLORIDE 50 MG: 25 TABLET ORAL at 01:40

## 2025-04-13 RX ADMIN — WARFARIN SODIUM 7.5 MG: 7.5 TABLET ORAL at 12:06

## 2025-04-13 RX ADMIN — PIPERACILLIN AND TAZOBACTAM 4.5 G: 4; .5 INJECTION, POWDER, FOR SOLUTION INTRAVENOUS at 14:08

## 2025-04-13 RX ADMIN — SODIUM CHLORIDE, POTASSIUM CHLORIDE, SODIUM LACTATE AND CALCIUM CHLORIDE 2190 ML: 600; 310; 30; 20 INJECTION, SOLUTION INTRAVENOUS at 03:58

## 2025-04-13 RX ADMIN — TERBINAFINE 250 MG: 250 TABLET ORAL at 08:44

## 2025-04-13 RX ADMIN — DIPHENHYDRAMINE HYDROCHLORIDE 25 MG: 25 TABLET ORAL at 23:31

## 2025-04-13 RX ADMIN — ACETAMINOPHEN 650 MG: 325 TABLET ORAL at 08:50

## 2025-04-13 RX ADMIN — SODIUM CHLORIDE, POTASSIUM CHLORIDE, SODIUM LACTATE AND CALCIUM CHLORIDE 1000 ML: 600; 310; 30; 20 INJECTION, SOLUTION INTRAVENOUS at 05:49

## 2025-04-13 RX ADMIN — SODIUM CHLORIDE, POTASSIUM CHLORIDE, SODIUM LACTATE AND CALCIUM CHLORIDE: 600; 310; 30; 20 INJECTION, SOLUTION INTRAVENOUS at 17:27

## 2025-04-13 RX ADMIN — CEPHALEXIN 1000 MG: 500 CAPSULE ORAL at 22:34

## 2025-04-13 RX ADMIN — INSULIN LISPRO 2 UNITS: 100 INJECTION, SOLUTION INTRAVENOUS; SUBCUTANEOUS at 08:39

## 2025-04-13 SDOH — ECONOMIC STABILITY: TRANSPORTATION INSECURITY
IN THE PAST 12 MONTHS, HAS THE LACK OF TRANSPORTATION KEPT YOU FROM MEDICAL APPOINTMENTS OR FROM GETTING MEDICATIONS?: PATIENT DECLINED

## 2025-04-13 SDOH — ECONOMIC STABILITY: TRANSPORTATION INSECURITY
IN THE PAST 12 MONTHS, HAS LACK OF RELIABLE TRANSPORTATION KEPT YOU FROM MEDICAL APPOINTMENTS, MEETINGS, WORK OR FROM GETTING THINGS NEEDED FOR DAILY LIVING?: PATIENT DECLINED

## 2025-04-13 ASSESSMENT — ENCOUNTER SYMPTOMS
NAUSEA: 0
LOSS OF CONSCIOUSNESS: 0
FEVER: 1
ABDOMINAL PAIN: 0
CHILLS: 1
PALPITATIONS: 0
DIARRHEA: 0
BLURRED VISION: 0
HEADACHES: 0
FALLS: 0
SEIZURES: 0
COUGH: 0
DOUBLE VISION: 0
CONSTIPATION: 0
VOMITING: 0
SPUTUM PRODUCTION: 0
DIZZINESS: 1
BACK PAIN: 1
FOCAL WEAKNESS: 0
SHORTNESS OF BREATH: 1
SORE THROAT: 1

## 2025-04-13 ASSESSMENT — SOCIAL DETERMINANTS OF HEALTH (SDOH)
WITHIN THE LAST YEAR, HAVE YOU BEEN KICKED, HIT, SLAPPED, OR OTHERWISE PHYSICALLY HURT BY YOUR PARTNER OR EX-PARTNER?: NO
WITHIN THE LAST YEAR, HAVE YOU BEEN AFRAID OF YOUR PARTNER OR EX-PARTNER?: NO
WITHIN THE LAST YEAR, HAVE TO BEEN RAPED OR FORCED TO HAVE ANY KIND OF SEXUAL ACTIVITY BY YOUR PARTNER OR EX-PARTNER?: NO
IN THE PAST 12 MONTHS, HAS THE ELECTRIC, GAS, OIL, OR WATER COMPANY THREATENED TO SHUT OFF SERVICE IN YOUR HOME?: PATIENT DECLINED
WITHIN THE LAST YEAR, HAVE YOU BEEN HUMILIATED OR EMOTIONALLY ABUSED IN OTHER WAYS BY YOUR PARTNER OR EX-PARTNER?: NO
WITHIN THE PAST 12 MONTHS, YOU WORRIED THAT YOUR FOOD WOULD RUN OUT BEFORE YOU GOT THE MONEY TO BUY MORE: PATIENT DECLINED
WITHIN THE PAST 12 MONTHS, THE FOOD YOU BOUGHT JUST DIDN'T LAST AND YOU DIDN'T HAVE MONEY TO GET MORE: PATIENT DECLINED

## 2025-04-13 ASSESSMENT — COGNITIVE AND FUNCTIONAL STATUS - GENERAL
DAILY ACTIVITIY SCORE: 24
SUGGESTED CMS G CODE MODIFIER DAILY ACTIVITY: CH
SUGGESTED CMS G CODE MODIFIER MOBILITY: CH
MOBILITY SCORE: 24

## 2025-04-13 ASSESSMENT — LIFESTYLE VARIABLES
HAVE PEOPLE ANNOYED YOU BY CRITICIZING YOUR DRINKING: NO
CONSUMPTION TOTAL: NEGATIVE
TOTAL SCORE: 0
HAVE YOU EVER FELT YOU SHOULD CUT DOWN ON YOUR DRINKING: NO
EVER HAD A DRINK FIRST THING IN THE MORNING TO STEADY YOUR NERVES TO GET RID OF A HANGOVER: NO
AVERAGE NUMBER OF DAYS PER WEEK YOU HAVE A DRINK CONTAINING ALCOHOL: 0
HOW MANY TIMES IN THE PAST YEAR HAVE YOU HAD 5 OR MORE DRINKS IN A DAY: 0
ALCOHOL_USE: NO
ON A TYPICAL DAY WHEN YOU DRINK ALCOHOL HOW MANY DRINKS DO YOU HAVE: 0
EVER FELT BAD OR GUILTY ABOUT YOUR DRINKING: NO
TOTAL SCORE: 0
TOTAL SCORE: 0
DOES PATIENT WANT TO STOP DRINKING: NO

## 2025-04-13 ASSESSMENT — FIBROSIS 4 INDEX
FIB4 SCORE: 0.9
FIB4 SCORE: 2.51

## 2025-04-13 ASSESSMENT — PAIN DESCRIPTION - PAIN TYPE
TYPE: ACUTE PAIN
TYPE: ACUTE PAIN

## 2025-04-13 NOTE — ED NOTES
Medication history reviewed with patient.  Med rec is complete.  Allergies reviewed.     Patient was started on Augmentin on 4/7/25, was told to discontinue Augmentin and start Levaquin on 4/11/25. Patient took one dose of Levaquin.     Anticoagulants taken in the last 14 days? Yes  Anticoagulant: Warfarin, Last dose: 4/11/25 PM  .   Dispense history available in EPIC? Yes      Fred Paul

## 2025-04-13 NOTE — ASSESSMENT & PLAN NOTE
Presented to wound care clinic 4/7/2025 for new wound, was on Augmentin but started on levofloxacin after cultures came back positive for Pseudomonas and MSSA  However reports allergic reaction to levofloxacin with fever/chills/sore throat/throat tightening  Unclear if fever/chills was from infection vs allergic reaction  Currently very soft BPs, and tachycardic, nearly meeting sepsis criteria    Continue Zosyn for coverage  Consider ID consult in the morning  Blood cultures x 2  Status post 2 L in ED, give 1 more liter as patient is hypotensive, then continue fluids 150 cc  Trend lactic acid

## 2025-04-13 NOTE — CARE PLAN
The patient is Stable - Low risk of patient condition declining or worsening    Shift Goals  Clinical Goals: Monitor blood glucose  Patient Goals: Comfort  Family Goals: LEYLA    Patient a/o x4. Patient stood at the side of the bed. Patient ambulates to bathroom. Patient has been afebrile and no chills. Call light and belongings are within reach. Bed is in low locked position.     Progress made toward(s) clinical / shift goals:    Problem: Knowledge Deficit - Standard  Goal: Patient and family/care givers will demonstrate understanding of plan of care, disease process/condition, diagnostic tests and medications  Outcome: Progressing     Problem: Hemodynamics  Goal: Patient's hemodynamics, fluid balance and neurologic status will be stable or improve  Outcome: Progressing     Problem: Fluid Volume  Goal: Fluid volume balance will be maintained  Outcome: Progressing     Problem: Urinary - Renal Perfusion  Goal: Ability to achieve and maintain adequate renal perfusion and functioning will improve  Outcome: Progressing     Problem: Respiratory  Goal: Patient will achieve/maintain optimum respiratory ventilation and gas exchange  Outcome: Progressing     Problem: Mechanical Ventilation  Goal: Safe management of artificial airway and ventilation  Outcome: Progressing  Goal: Successful weaning off mechanical ventilator, spontaneously maintains adequate gas exchange  Outcome: Progressing  Goal: Patient will be able to express needs and understand communication  Outcome: Progressing     Problem: Physical Regulation  Goal: Diagnostic test results will improve  Outcome: Progressing  Goal: Signs and symptoms of infection will decrease  Outcome: Progressing     Problem: Pain - Standard  Goal: Alleviation of pain or a reduction in pain to the patient’s comfort goal  Outcome: Progressing     Problem: Fall Risk  Goal: Patient will remain free from falls  Outcome: Progressing       Patient is not progressing towards the following  goals:

## 2025-04-13 NOTE — ASSESSMENT & PLAN NOTE
Creatinine of 1.4 on admission  Could be secondary to endorgan damage from infection    Fluids as above  Continue to monitor

## 2025-04-13 NOTE — ED TRIAGE NOTES
Chief Complaint   Patient presents with    Fever     Fever and chills after taking amoxiclav 875/125 mg -> redness of his wounds  + throat swelling -> resolved  + itchiness-> resolved    Temp: 100 F    Chills     Pain:  0/10  Ambulatory:  Yes  Alert and Oriented: x 4  Oxygen Treatment: No    Pt came in to triage for the above complaints.     Pt is speaking in full sentences, follows commands and responds appropriately to questions.     Respirations are even and unlabored.    Pt placed in lobby. Pt educated on triage process.     Pt encouraged to inform staff for any changes in condition or if needs help while waiting to be room in.      Vitals:    04/13/25 0030   BP: 126/83   Pulse: (!) 140   Resp: 16   Temp: 36.9 °C (98.5 °F)   SpO2: 98%

## 2025-04-13 NOTE — ED PROVIDER NOTES
"ED Provider Note    CHIEF COMPLAINT  Chief Complaint   Patient presents with    Fever     Fever and chills after taking amoxiclav 875/125 mg -> redness of his wounds  + throat swelling -> resolved  + itchiness-> resolved    Temp: 100 F    Chills       EXTERNAL RECORDS REVIEWED  Outpatient Notes outpatient wound care notes and culture results reviewed    HPI/ROS  LIMITATION TO HISTORY   Select: : None  OUTSIDE HISTORIAN(S):  None    Christ Calixto is a 40 y.o. male who presents to the emergency department with subjective fever, chills and concern over medication side effect.  Patient states that he is followed by outpatient wound care for chronic lower extremity wounds.  Had been started on Augmentin last week and today took first dose of Levaquin for reasons unknown to him.  Chart review reveals that wound cultures were positive for Pseudomonas and MSSA and therefore this additional antibiotic was added.  Earlier after taking the medication the patient was having some throat discomfort and generalized itchiness but currently \"just feels hot \"    PAST MEDICAL HISTORY   has a past medical history of Acute midline low back pain with left-sided sciatica (9/3/2020), Anxiety (2/18/2020), Arthritis, Blood clotting disorder ([facility]) (2019), Diabetes ([facility]), Heart burn, Hemorrhagic disorder ([facility]), Hypertension, Obesities, morbid ([facility]), JAYLIN (obstructive sleep apnea) (1/30/2020), Seizure ([facility]) (12/30/2019), Smoking (7/10/2014), and Stroke ([facility]).    SURGICAL HISTORY   has a past surgical history that includes sigmoidoscopy,diagnostic (N/A, 10/12/2021) and removal anal fistula,subcutaneous (N/A, 11/9/2021).    FAMILY HISTORY  Family History   Problem Relation Age of Onset    Diabetes Mother     Diabetes Father     Heart Disease Paternal Grandfather         MI       SOCIAL HISTORY  Social History     Tobacco Use    Smoking status: Every Day     Current packs/day: 0.40     Average packs/day: 0.9 packs/day for 16.2 years (14.3 " "ttl pk-yrs)     Types: Cigarettes     Start date: 9/28/2006     Last attempt to quit: 1/30/2019    Smokeless tobacco: Never   Vaping Use    Vaping status: Never Used   Substance and Sexual Activity    Alcohol use: No    Drug use: Yes     Types: Inhaled     Comment: ocassional marijuana    Sexual activity: Not Currently       CURRENT MEDICATIONS  Home Medications       Reviewed by Flako Limon R.N. (Registered Nurse) on 04/13/25 at 0042  Med List Status: Partial     Medication Last Dose Status   acetaminophen (TYLENOL) 500 MG Tab  Active   atorvastatin (LIPITOR) 20 MG Tab  Active   Cholecalciferol (VITAMIN D) 2000 UNIT Tab  Active   glyBURIDE-metFORMIN (GLUCOVANCE) 5-500 MG Tab  Active   levoFLOXacin (LEVAQUIN) 750 MG tablet  Active   lisinopril-hydrochlorothiazide (PRINZIDE) 20-25 MG per tablet  Active   ondansetron (ZOFRAN ODT) 4 MG TABLET DISPERSIBLE  Active   warfarin (COUMADIN) 5 MG Tab  Active                    ALLERGIES  No Known Allergies    PHYSICAL EXAM  VITAL SIGNS: BP 95/50   Pulse (!) 109   Temp 36.9 °C (98.5 °F) (Temporal)   Resp 19   Ht 1.778 m (5' 10\")   Wt (!) 159 kg (350 lb)   SpO2 97%   BMI 50.22 kg/m²      Pulse ox interpretation: I interpret this pulse ox as normal.  Constitutional: Alert in no apparent distress.  HENT: No signs of trauma, Bilateral external ears normal, Nose normal.   Eyes: Pupils are equal and reactive  Neck: Normal range of motion, No tenderness, Supple  Cardiovascular: Regular rate and rhythm, no murmurs.   Thorax & Lungs: Normal breath sounds, No respiratory distress  Skin: Warm, Dry  Left lower extremity: Wound care wound bandages in place over dermal wounds to left leg.  Musculoskeletal: Good range of motion in all major joints. No tenderness to palpation or major deformities noted.   Neurologic: Alert , Normal motor function, Normal sensory function, No focal deficits noted.   Psychiatric: Affect normal, Judgment normal, Mood normal.         EKG/LABS  Results " for orders placed or performed during the hospital encounter of 04/13/25   Lactic Acid    Collection Time: 04/13/25  3:47 AM   Result Value Ref Range    Lactic Acid 2.7 (H) 0.5 - 2.0 mmol/L   CBC with Differential    Collection Time: 04/13/25  3:47 AM   Result Value Ref Range    WBC 9.7 4.8 - 10.8 K/uL    RBC 5.74 4.70 - 6.10 M/uL    Hemoglobin 16.2 14.0 - 18.0 g/dL    Hematocrit 49.0 42.0 - 52.0 %    MCV 85.4 81.4 - 97.8 fL    MCH 28.2 27.0 - 33.0 pg    MCHC 33.1 32.3 - 36.5 g/dL    RDW 40.8 35.9 - 50.0 fL    Platelet Count 241 164 - 446 K/uL    MPV 9.4 9.0 - 12.9 fL    Neutrophils-Polys 90.00 (H) 44.00 - 72.00 %    Lymphocytes 6.00 (L) 22.00 - 41.00 %    Monocytes 2.10 0.00 - 13.40 %    Eosinophils 0.60 0.00 - 6.90 %    Basophils 0.60 0.00 - 1.80 %    Immature Granulocytes 0.70 0.00 - 0.90 %    Nucleated RBC 0.00 0.00 - 0.20 /100 WBC    Neutrophils (Absolute) 8.68 (H) 1.82 - 7.42 K/uL    Lymphs (Absolute) 0.58 (L) 1.00 - 4.80 K/uL    Monos (Absolute) 0.20 0.00 - 0.85 K/uL    Eos (Absolute) 0.06 0.00 - 0.51 K/uL    Baso (Absolute) 0.06 0.00 - 0.12 K/uL    Immature Granulocytes (abs) 0.07 0.00 - 0.11 K/uL    NRBC (Absolute) 0.00 K/uL   Complete Metabolic Panel    Collection Time: 04/13/25  3:47 AM   Result Value Ref Range    Sodium 135 135 - 145 mmol/L    Potassium 3.9 3.6 - 5.5 mmol/L    Chloride 101 96 - 112 mmol/L    Co2 22 20 - 33 mmol/L    Anion Gap 12.0 7.0 - 16.0    Glucose 179 (H) 65 - 99 mg/dL    Bun 20 8 - 22 mg/dL    Creatinine 1.44 (H) 0.50 - 1.40 mg/dL    Calcium 9.1 8.5 - 10.5 mg/dL    Correct Calcium 9.2 8.5 - 10.5 mg/dL    AST(SGOT) 32 12 - 45 U/L    ALT(SGPT) 38 2 - 50 U/L    Alkaline Phosphatase 69 30 - 99 U/L    Total Bilirubin 2.4 (H) 0.1 - 1.5 mg/dL    Albumin 3.9 3.2 - 4.9 g/dL    Total Protein 7.7 6.0 - 8.2 g/dL    Globulin 3.8 (H) 1.9 - 3.5 g/dL    A-G Ratio 1.0 g/dL   ESTIMATED GFR    Collection Time: 04/13/25  3:47 AM   Result Value Ref Range    GFR (CKD-EPI) 62 >60 mL/min/1.73 m 2              COURSE & MEDICAL DECISION MAKING    ASSESSMENT, COURSE AND PLAN  Care Narrative: 40-year-old male presenting with above presentation.  Initially we will treat with antihistamines.    DISPOSITION AND DISCUSSIONS  I have discussed management of the patient with the following physicians and NATHEN's: Medicine service    Discussion of management with other [facility] or appropriate source(s): Pharmacy for medication selection and verification      40-year-old male presenting to the emerged from with above presentation.  Overall the patient appears clinically well.  Patient feeling slightly better after antihistamines however vital signs have worsened with increased tachycardia and decreased blood pressure.  I believe this is more likely secondary to infectious reaction rather than anaphylaxis.  Additional labs will be ordered.  Given culture sensitivities and no other appropriate oral options for pseudomonal coverage will admit and I have started Zosyn.  This medication was started at the point of lab review and medication review and culture review with pharmacist at roughly 3 AM.  This is the point at which I had increased concern for more systemic infection and possibility of sepsis.    FINAL DIAGNOSIS  1. Left leg cellulitis         Electronically signed by: Roberto Santana M.D., 4/13/2025 1:33 AM

## 2025-04-13 NOTE — PROGRESS NOTES
Received patient from the ED alert and oriented x4. On room air, able to ambulate from gurney to bed. Medicated per MAR and tolerated well. Admit profile and skin check not completed at this time.per pt he wants to rest now and can do it later. Fall precautions in place ( alarm) and call light within reach. All needs met at this time.

## 2025-04-13 NOTE — PROGRESS NOTES
4 Eyes Skin Assessment Completed by Jennifer Schoening, RN and Marilin Emmanuel RN.    Head WDL  Ears WDL  Nose WDL  Mouth WDL  Neck WDL  Breast/Chest WDL  Shoulder Blades WDL  Spine WDL  (R) Arm/Elbow/Hand Redness    (L) Arm/Elbow/Hand Scratches   Abdomen WDL  Groin WDL  Scrotum/Coccyx/Buttocks WDL  (R) Leg WDL  (L) Leg Redness and Edema, 2 wounds near ankle   (R) Heel/Foot/Toe Dry and cracking  (L) Heel/Foot/Toe Dry and cracking          Devices In Places Blood Pressure Cuff, PIV      Interventions In Place Pillows    Possible Skin Injury Yes    Pictures Uploaded Into Epic Yes  Wound Consult Placed Yes  RN Wound Prevention Protocol Ordered Yes

## 2025-04-13 NOTE — H&P
[facility] Internal Medicine History & Physical Note    Date of Service  4/13/2025    [facility] Team: ANUPAMA   Attending: Shiva Ortega M.d.  Senior Resident: Dr. Seay  Contact Number: [phone]    Primary Care Physician  MAXX Williamson.    Consultants  None    Code Status  Full Code    Chief Complaint  Chief Complaint   Patient presents with    Fever     Fever and chills after taking amoxiclav 875/125 mg -> redness of his wounds  + throat swelling -> resolved  + itchiness-> resolved    Temp: 100 F    Chills       History of Presenting Illness (HPI):  Christ Calixto is a 40 y.o. male with type 2 diabetes and chronic leg wounds following with wound clinic, who presented 4/13/2025 with allergic reaction after taking levofloxacin for left leg wound.    Patient was recently seen 4/7/2025, there is noted he developed a new wound on the left posterior medial lower leg.  He is not sure how this occurred, and believes he may have scratched his skin somewhere.  He himself is not sure of exact onset, and states it does not hurt at all.  He was started on Augmentin, with wound cultures collected.  A week later, it was shown that his wound cultures were growing Pseudomonas and MSSA, so he was started on levofloxacin.  However, after taking levofloxacin, patient states that he had chills, fever, sore throat, and tightening sensation of his throat.  At that point he presented to the ED for further evaluation.    On initial presentation, he had temperature 98.5, heart rate 140, respiratory rate 16, blood pressure 126/83, and oxygen saturation 98% on room air.  His CBC demonstrated WBC 9.7, hemoglobin 16.2, platelets 241.  CMP notable for creatinine 1.44.  Lactic acid 2.7.  While in ED, patient given Zosyn, LR 2197 ml,, and Benadryl 50.  Patient will be admitted for management of left lower leg cellulitis, and suspected allergic reaction to levofloxacin.    I discussed the plan of care with patient.    Review of  Systems  Review of Systems   Constitutional:  Positive for chills and fever.   HENT:  Positive for sore throat. Negative for hearing loss.    Eyes:  Negative for blurred vision and double vision.   Respiratory:  Positive for shortness of breath. Negative for cough and sputum production.    Cardiovascular:  Negative for chest pain and palpitations.   Gastrointestinal:  Negative for abdominal pain, constipation, diarrhea, nausea and vomiting.   Genitourinary:  Negative for dysuria and hematuria.   Musculoskeletal:  Positive for back pain. Negative for falls and joint pain.   Skin:  Positive for rash. Negative for itching.   Neurological:  Positive for dizziness. Negative for focal weakness, seizures, loss of consciousness and headaches.       Past Medical History   has a past medical history of Acute midline low back pain with left-sided sciatica (9/3/2020), Anxiety (2/18/2020), Arthritis, Blood clotting disorder ([facility]) (2019), Diabetes ([facility]), Heart burn, Hemorrhagic disorder ([facility]), Hypertension, Obesities, morbid ([facility]), JAYLIN (obstructive sleep apnea) (1/30/2020), Seizure ([facility]) (12/30/2019), Smoking (7/10/2014), and Stroke ([facility]).    Surgical History   has a past surgical history that includes pr sigmoidoscopy,diagnostic (N/A, 10/12/2021) and pr removal anal fistula,subcutaneous (N/A, 11/9/2021).     Family History  family history includes Diabetes in his father and mother; Heart Disease in his paternal grandfather.   Family history reviewed with patient.     Social History  Tobacco: 10 cigarettes a day  Alcohol: None recent  Recreational drugs (illegal or prescription): Denies  Living Situation: With roommates  Recent Travel: Denies  Primary Care Provider: Reviewed    Other (stressors, spirituality, exposures): N/A    Allergies  No Known Allergies    Medications  Prior to Admission Medications   Prescriptions Last Dose Informant Patient Reported? Taking?   Cholecalciferol (VITAMIN D) 2000 UNIT Tab 4/12/2025 Morning  Patient Yes Yes   Sig: Take 2,000 Units by mouth every day.   amoxicillin-clavulanate (AUGMENTIN) 875-125 MG Tab 4/10/2025 Evening  Yes Yes   Sig: Take 1 Tablet by mouth 2 times a day. Started 4/7/25   Patient taking differently: Take 1 Tablet by mouth 2 times a day. Started 4/7/25  DISCONTINUED BY PROVIDER, CHANGED TO LEVOFLOXACIN   atorvastatin (LIPITOR) 20 MG Tab 4/12/2025 Morning Patient No Yes   Sig: Take 1 tablet by mouth nightly   Patient taking differently: Take 40 mg by mouth every day. Take 1 tablet by mouth nightly   glyBURIDE-metFORMIN (GLUCOVANCE) 5-500 MG Tab 4/12/2025 Morning Patient No Yes   Sig: TAKE 1 TABLET BY MOUTH IN THE MORNING WITH BREAKFAST   Patient taking differently: Take 1 Tablet by mouth every morning with breakfast. TAKE 1 TABLET BY MOUTH IN THE MORNING WITH BREAKFAST   levoFLOXacin (LEVAQUIN) 750 MG tablet 4/12/2025 Morning  No Yes   Sig: Take 1 Tablet by mouth every day for 10 days.   lisinopril-hydrochlorothiazide (PRINZIDE) 20-25 MG per tablet 4/12/2025 Morning Patient No Yes   Sig: Take 1 tablet by mouth every day.   warfarin (COUMADIN) 5 MG Tab 4/11/2025 Evening Patient Yes No   Sig: Take 5 mg by mouth every day.      Facility-Administered Medications: None       Physical Exam  Temp:  [36.9 °C (98.5 °F)] 36.9 °C (98.5 °F)  Pulse:  [109-140] 109  Resp:  [16-19] 19  BP: ()/(50-83) 95/50  SpO2:  [95 %-99 %] 97 %  Blood Pressure: 95/50   Temperature: 36.9 °C (98.5 °F)   Pulse: (!) 109   Respiration: 19   Pulse Oximetry: 97 %       Physical Exam  Constitutional:       General: He is not in acute distress.     Appearance: Normal appearance. He is not ill-appearing or toxic-appearing.   HENT:      Head: Normocephalic and atraumatic.      Right Ear: External ear normal.      Left Ear: External ear normal.      Nose: Nose normal. No rhinorrhea.      Mouth/Throat:      Mouth: Mucous membranes are moist.   Eyes:      General: No scleral icterus.        Right eye: No discharge.          "Left eye: No discharge.      Extraocular Movements: Extraocular movements intact.   Cardiovascular:      Rate and Rhythm: Regular rhythm. Tachycardia present.      Pulses: Normal pulses.      Heart sounds: No murmur heard.     No gallop.   Pulmonary:      Effort: Pulmonary effort is normal. No respiratory distress.      Breath sounds: Normal breath sounds. No wheezing or rales.   Abdominal:      General: Bowel sounds are normal. There is no distension.      Palpations: Abdomen is soft.      Tenderness: There is no abdominal tenderness. There is no guarding or rebound.   Musculoskeletal:         General: No swelling or tenderness.      Right lower leg: No edema.      Left lower leg: No edema.      Comments: Left leg with well bandaged wound, underneath no obvious erythema, has packing inside ulcer   Skin:     General: Skin is warm.      Capillary Refill: Capillary refill takes less than 2 seconds.      Coloration: Skin is not jaundiced.      Findings: No rash.   Neurological:      Mental Status: He is alert and oriented to person, place, and time. Mental status is at baseline.   Psychiatric:         Mood and Affect: Mood normal.         Behavior: Behavior normal.         Laboratory:  Recent Labs     04/13/25  0347   WBC 9.7   RBC 5.74   HEMOGLOBIN 16.2   HEMATOCRIT 49.0   MCV 85.4   MCH 28.2   MCHC 33.1   RDW 40.8   PLATELETCT 241   MPV 9.4     Recent Labs     04/13/25  0347   SODIUM 135   POTASSIUM 3.9   CHLORIDE 101   CO2 22   GLUCOSE 179*   BUN 20   CREATININE 1.44*   CALCIUM 9.1     Recent Labs     04/13/25  0347   ALTSGPT 38   ASTSGOT 32   ALKPHOSPHAT 69   TBILIRUBIN 2.4*   GLUCOSE 179*         No results for input(s): \"NTPROBNP\" in the last 72 hours.      No results for input(s): \"TROPONINT\" in the last 72 hours.    Imaging:  No orders to display       no X-Ray or EKG requiring interpretation    Assessment/Plan:  I anticipate this patient will require at least two midnights for appropriate medical management, " necessitating inpatient admission because due to left lower leg cellulitis, with no obvious p.o. antibiotic to cover Pseudomonas and MSSA, due to concern of levofloxacin allergy  Patient will need a Med/Surg bed on MEDICAL service .  The need is secondary to IV antibiotics.    * Cellulitis of left leg- (present on admission)  Assessment & Plan  Presented to wound care clinic 4/7/2025 for new wound, was on Augmentin but started on levofloxacin after cultures came back positive for Pseudomonas and MSSA  However reports allergic reaction to levofloxacin with fever/chills/sore throat/throat tightening  Unclear if fever/chills was from infection vs allergic reaction  Currently very soft BPs, and tachycardic, nearly meeting sepsis criteria    Continue Zosyn for coverage  Consider ID consult in the morning  Blood cultures x 2  Status post 2 L in ED, give 1 more liter as patient is hypotensive, then continue fluids 150 cc  Trend lactic acid    ABHIJIT (acute kidney injury) (HCC)- (present on admission)  Assessment & Plan  Creatinine of 1.4 on admission  Could be secondary to endorgan damage from infection    Fluids as above  Continue to monitor    Hypertension- (present on admission)  Assessment & Plan  History of  Currently hypotensive due to infection    Hold home lisinopril hydrochlorothiazide    History of CVA (cerebrovascular accident)- (present on admission)  Assessment & Plan  Continue home atorvastatin    Antiphospholipid syndrome (HCC)- (present on admission)  Assessment & Plan  Continue home warfarin  Pharmacy warfarin dosing    JAYLIN (obstructive sleep apnea)- (present on admission)  Assessment & Plan  Continue CPAP    Type 2 diabetes mellitus without complication, without long-term current use of insulin (HCC)- (present on admission)  Assessment & Plan  Hold home glyburide metformin    Sliding scale insulin  Accu-Cheks, hypoglycemic protocol        VTE prophylaxis: therapeutic anticoagulation with warfarin

## 2025-04-14 ENCOUNTER — PHARMACY VISIT (OUTPATIENT)
Dept: PHARMACY | Facility: MEDICAL CENTER | Age: 40
End: 2025-04-14
Payer: COMMERCIAL

## 2025-04-14 VITALS
SYSTOLIC BLOOD PRESSURE: 121 MMHG | RESPIRATION RATE: 17 BRPM | HEIGHT: 70 IN | BODY MASS INDEX: 45.1 KG/M2 | OXYGEN SATURATION: 93 % | WEIGHT: 315 LBS | TEMPERATURE: 95.8 F | DIASTOLIC BLOOD PRESSURE: 87 MMHG | HEART RATE: 98 BPM

## 2025-04-14 PROBLEM — Z72.0 TOBACCO CONSUMPTION: Status: ACTIVE | Noted: 2025-04-14

## 2025-04-14 LAB
ALBUMIN SERPL BCP-MCNC: 3.2 G/DL (ref 3.2–4.9)
ALBUMIN/GLOB SERPL: 1 G/DL
ALP SERPL-CCNC: 51 U/L (ref 30–99)
ALT SERPL-CCNC: 26 U/L (ref 2–50)
ANION GAP SERPL CALC-SCNC: 10 MMOL/L (ref 7–16)
AST SERPL-CCNC: 24 U/L (ref 12–45)
BILIRUB SERPL-MCNC: 2.4 MG/DL (ref 0.1–1.5)
BUN SERPL-MCNC: 22 MG/DL (ref 8–22)
CALCIUM ALBUM COR SERPL-MCNC: 9 MG/DL (ref 8.5–10.5)
CALCIUM SERPL-MCNC: 8.4 MG/DL (ref 8.5–10.5)
CHLORIDE SERPL-SCNC: 105 MMOL/L (ref 96–112)
CO2 SERPL-SCNC: 21 MMOL/L (ref 20–33)
CREAT SERPL-MCNC: 1.35 MG/DL (ref 0.5–1.4)
ERYTHROCYTE [DISTWIDTH] IN BLOOD BY AUTOMATED COUNT: 40.9 FL (ref 35.9–50)
GFR SERPLBLD CREATININE-BSD FMLA CKD-EPI: 67 ML/MIN/1.73 M 2
GLOBULIN SER CALC-MCNC: 3.2 G/DL (ref 1.9–3.5)
GLUCOSE BLD STRIP.AUTO-MCNC: 183 MG/DL (ref 65–99)
GLUCOSE SERPL-MCNC: 124 MG/DL (ref 65–99)
HCT VFR BLD AUTO: 43.8 % (ref 42–52)
HGB BLD-MCNC: 14.8 G/DL (ref 14–18)
INR PPP: 1.86 (ref 0.87–1.13)
MCH RBC QN AUTO: 28.8 PG (ref 27–33)
MCHC RBC AUTO-ENTMCNC: 33.8 G/DL (ref 32.3–36.5)
MCV RBC AUTO: 85.2 FL (ref 81.4–97.8)
PLATELET # BLD AUTO: 166 K/UL (ref 164–446)
PMV BLD AUTO: 9.4 FL (ref 9–12.9)
POTASSIUM SERPL-SCNC: 3.7 MMOL/L (ref 3.6–5.5)
PROT SERPL-MCNC: 6.4 G/DL (ref 6–8.2)
PROTHROMBIN TIME: 21.5 SEC (ref 12–14.6)
RBC # BLD AUTO: 5.14 M/UL (ref 4.7–6.1)
SODIUM SERPL-SCNC: 136 MMOL/L (ref 135–145)
WBC # BLD AUTO: 5.7 K/UL (ref 4.8–10.8)

## 2025-04-14 PROCEDURE — A9270 NON-COVERED ITEM OR SERVICE: HCPCS | Performed by: STUDENT IN AN ORGANIZED HEALTH CARE EDUCATION/TRAINING PROGRAM

## 2025-04-14 PROCEDURE — 99239 HOSP IP/OBS DSCHRG MGMT >30: CPT | Performed by: STUDENT IN AN ORGANIZED HEALTH CARE EDUCATION/TRAINING PROGRAM

## 2025-04-14 PROCEDURE — 700102 HCHG RX REV CODE 250 W/ 637 OVERRIDE(OP): Performed by: STUDENT IN AN ORGANIZED HEALTH CARE EDUCATION/TRAINING PROGRAM

## 2025-04-14 PROCEDURE — RXMED WILLOW AMBULATORY MEDICATION CHARGE: Performed by: STUDENT IN AN ORGANIZED HEALTH CARE EDUCATION/TRAINING PROGRAM

## 2025-04-14 PROCEDURE — 85610 PROTHROMBIN TIME: CPT

## 2025-04-14 PROCEDURE — 700102 HCHG RX REV CODE 250 W/ 637 OVERRIDE(OP)

## 2025-04-14 PROCEDURE — 85027 COMPLETE CBC AUTOMATED: CPT

## 2025-04-14 PROCEDURE — A9270 NON-COVERED ITEM OR SERVICE: HCPCS

## 2025-04-14 PROCEDURE — 82962 GLUCOSE BLOOD TEST: CPT

## 2025-04-14 PROCEDURE — 80053 COMPREHEN METABOLIC PANEL: CPT

## 2025-04-14 RX ORDER — DIPHENHYDRAMINE HCL 25 MG
50 TABLET ORAL EVERY 6 HOURS PRN
Qty: 30 TABLET | Refills: 0 | Status: SHIPPED | OUTPATIENT
Start: 2025-04-14

## 2025-04-14 RX ORDER — DIPHENHYDRAMINE HYDROCHLORIDE, ZINC ACETATE 2; .1 G/100G; G/100G
1 CREAM TOPICAL 3 TIMES DAILY PRN
Qty: 35 G | Refills: 0 | Status: SHIPPED | OUTPATIENT
Start: 2025-04-14 | End: 2025-04-24

## 2025-04-14 RX ORDER — DIPHENHYDRAMINE HYDROCHLORIDE, ZINC ACETATE 2; .1 G/100G; G/100G
CREAM TOPICAL 3 TIMES DAILY PRN
Status: DISCONTINUED | OUTPATIENT
Start: 2025-04-14 | End: 2025-04-14 | Stop reason: HOSPADM

## 2025-04-14 RX ORDER — HYDROXYZINE HYDROCHLORIDE 25 MG/1
25 TABLET, FILM COATED ORAL 3 TIMES DAILY PRN
Qty: 30 TABLET | Refills: 0 | Status: CANCELLED | OUTPATIENT
Start: 2025-04-14

## 2025-04-14 RX ORDER — TERBINAFINE HYDROCHLORIDE 250 MG/1
250 TABLET ORAL DAILY
Qty: 30 TABLET | Refills: 3 | Status: SHIPPED | OUTPATIENT
Start: 2025-04-14

## 2025-04-14 RX ORDER — CEPHALEXIN 500 MG/1
1000 CAPSULE ORAL EVERY 8 HOURS
Qty: 18 CAPSULE | Refills: 0 | Status: ACTIVE | OUTPATIENT
Start: 2025-04-14 | End: 2025-04-14

## 2025-04-14 RX ORDER — CIPROFLOXACIN 750 MG/1
750 TABLET, FILM COATED ORAL EVERY 12 HOURS
Qty: 20 TABLET | Refills: 0 | Status: ACTIVE | OUTPATIENT
Start: 2025-04-14 | End: 2025-04-24

## 2025-04-14 RX ORDER — DIPHENHYDRAMINE HYDROCHLORIDE, ZINC ACETATE 2; .1 G/100G; G/100G
CREAM TOPICAL ONCE
Status: DISCONTINUED | OUTPATIENT
Start: 2025-04-14 | End: 2025-04-14

## 2025-04-14 RX ORDER — CIPROFLOXACIN 750 MG/1
750 TABLET, FILM COATED ORAL EVERY 12 HOURS
Qty: 18 TABLET | Refills: 0 | Status: ACTIVE | OUTPATIENT
Start: 2025-04-14 | End: 2025-04-14

## 2025-04-14 RX ORDER — NICOTINE 21 MG/24HR
1 PATCH, TRANSDERMAL 24 HOURS TRANSDERMAL EVERY 24 HOURS
Qty: 70 PATCH | Refills: 0 | Status: SHIPPED | OUTPATIENT
Start: 2025-04-14 | End: 2025-06-23

## 2025-04-14 RX ORDER — DIPHENHYDRAMINE HCL 25 MG
50 TABLET ORAL ONCE
Status: COMPLETED | OUTPATIENT
Start: 2025-04-14 | End: 2025-04-14

## 2025-04-14 RX ADMIN — INSULIN LISPRO 2 UNITS: 100 INJECTION, SOLUTION INTRAVENOUS; SUBCUTANEOUS at 09:08

## 2025-04-14 RX ADMIN — Medication: at 08:57

## 2025-04-14 RX ADMIN — AMOXICILLIN AND CLAVULANATE POTASSIUM 1 TABLET: 875; 125 TABLET, FILM COATED ORAL at 08:56

## 2025-04-14 RX ADMIN — ATORVASTATIN CALCIUM 40 MG: 40 TABLET, FILM COATED ORAL at 05:55

## 2025-04-14 RX ADMIN — CIPROFLOXACIN HYDROCHLORIDE 750 MG: 500 TABLET, FILM COATED ORAL at 08:56

## 2025-04-14 RX ADMIN — DIPHENHYDRAMINE HYDROCHLORIDE 50 MG: 25 TABLET ORAL at 08:56

## 2025-04-14 NOTE — PROGRESS NOTES
Pt sent to [facility] with transport via wheelchair.  All pt belongings sent down. Pt informed meds to bed would be delivered downstairs.  No additional needs at this time. Pt noted benadryl helped with allergic reaction.

## 2025-04-14 NOTE — DISCHARGE SUMMARY
Discharge Summary    CHIEF COMPLAINT ON ADMISSION  Chief Complaint   Patient presents with    Fever     Fever and chills after taking amoxiclav 875/125 mg -> redness of his wounds  + throat swelling -> resolved  + itchiness-> resolved    Temp: 100 F    Chills       Reason for Admission  Flu Like symptoms     Admission Date  4/13/2025    CODE STATUS  Prior    HPI & HOSPITAL COURSE  This is a 40 y.o. male here with a past medical history significant for left leg wound for which he sees wound care as an outpatient.    The patient reported he was initially provided Augmentin.  However, he received a call from the outpatient wound clinic as they were informed of the positive Pseudomonas finding on wound culture.  As such, the patient was placed on levofloxacin.    The patient reported having a rash and swelling after starting the levofloxacin.  The patient expressed concern for an allergic reaction to levofloxacin.  As such, this medication was immediately discontinued.  He was placed on empiric Zosyn to treat his cellulitis.  This concern was discussed with the infectious disease pharmacist who determined that the patient has previously tolerated oral ciprofloxacin previously.  As such, Zosyn was discontinued and the patient was transition to oral ciprofloxacin.  His previous wound culture and susceptibility was reviewed and ciprofloxacin was an appropriate choice based on susceptibilities.  The patient had requested to be resumed on Augmentin which he tolerated previously as well.  Again, the wound culture and susceptibilities deemed that Augmentin was appropriate. The patient denied any side effects related to ciprofloxacin administration.    The patient did show redness on his arms and back which is likely related to his drug reaction.  Both oral and topical steroids were considered however ultimately not provided in attempt to reduce the patient's risk for side effects.  Alternatively, the patient was provided as  needed oral and topical Benadryl for his rash for which she was provided some relief in his symptoms with an interval improvement in the appearance of his rash.    While hospitalized, he was found to have an ABHIJIT for which she was provided IV fluid resuscitation.  His ABHIJIT resolved on the day of discharge.  The patient's outpatient hydrochlorothiazide/lisinopril combination medication was held as his blood pressures were rather soft and he had an ABHIJIT.  He is to discuss this medication with his outpatient primary care physician.    Therefore, he is discharged in good and stable condition to home with close outpatient follow-up.    The patient recovered much more quickly than anticipated on admission.    Discharge Date  4/14/2025    FOLLOW UP ITEMS POST DISCHARGE  The patient is to follow-up with his primary care physician within 1 week of doing discharge  -Blood pressure management    The patient's follow-up with wound care as scheduled on Monday 4/21    DISCHARGE DIAGNOSES  Principal Problem:    Sepsis due to cellulitis (HCC) (POA: Yes)  Active Problems:    Hypertension (POA: Yes)    Type 2 diabetes mellitus without complication, without long-term current use of insulin (HCC) (POA: Yes)    JAYLIN (obstructive sleep apnea) (POA: Yes)    Antiphospholipid syndrome (HCC) (POA: Yes)    ABHIJIT (acute kidney injury) (HCC) (POA: Yes)    History of CVA (cerebrovascular accident) (POA: Yes)    Class 3 severe obesity due to excess calories with serious comorbidity and body mass index (BMI) of 50.0 to 59.9 in adult (HCC) (POA: Yes)    Onychomycosis (POA: Yes)    Tobacco consumption (POA: Unknown)  Resolved Problems:    Sepsis (HCC) (POA: Yes)      FOLLOW UP  Future Appointments   Date Time Provider Department Center   4/21/2025  2:30 PM ZHANE Garrett [address] [place] [address]   5/5/2025  2:00 PM ZHANE Garrett [address] [place] [address], Emergency Dept  [address] [facility] [address]  [phone]  Go to  If  symptoms worsen      MEDICATIONS ON DISCHARGE     Medication List        PAUSE taking these medications        Instructions   lisinopril-hydrochlorothiazide 20-25 MG per tablet  Wait to take this until your doctor or other care provider tells you to start again.  Commonly known as: Prinzide   Take 1 tablet by mouth every day.  Dose: 1 Tablet            START taking these medications        Instructions   ciprofloxacin 750 MG Tabs  Commonly known as: Cipro   Take 1 Tablet by mouth every 12 hours for 10 days.  Dose: 750 mg     diphenhydrAMINE 25 MG Tabs  Commonly known as: Benadryl   Take 2 Tablets by mouth every 6 hours as needed for Itching.  Dose: 50 mg     diphenhydrAMINE-zinc acetate cream  Commonly known as: Benadryl Itch   Apply 1 Each topically 3 times a day as needed (rash) for up to 10 days.  Dose: 1 Each     nicotine 14 MG/24HR Pt24  Commonly known as: Nicoderm   Place 1 Patch on the skin every 24 hours for 70 days.  Dose: 1 Patch     nicotine polacrilex 2 MG Gum  Commonly known as: Nicorette   Take 1 Each by mouth every 1 hour as needed for Smoking Cessation (For nicotine urge).  Dose: 2 mg     terbinafine 250 MG Tabs  Commonly known as: LamISIL   Take 1 Tablet by mouth every day.  Dose: 250 mg            CHANGE how you take these medications        Instructions   amoxicillin-clavulanate 875-125 MG Tabs  What changed:   when to take this  additional instructions  Commonly known as: Augmentin   Take 1 Tablet by mouth every 12 hours for 10 days.  Dose: 1 Tablet     atorvastatin 20 MG Tabs  What changed:   how much to take  how to take this  when to take this  Commonly known as: Lipitor   Take 1 tablet by mouth nightly     glyBURIDE-metFORMIN 5-500 MG Tabs  What changed:   how much to take  how to take this  when to take this  Commonly known as: Glucovance   TAKE 1 TABLET BY MOUTH IN THE MORNING WITH BREAKFAST            CONTINUE taking these medications        Instructions   vitamin D 2000 UNIT Tabs   Take  2,000 Units by mouth every day.  Dose: 2,000 Units     warfarin 5 MG Tabs  Commonly known as: Coumadin   Take 5 mg by mouth every day.  Dose: 5 mg            STOP taking these medications      levoFLOXacin 750 MG tablet  Commonly known as: Levaquin              Allergies  Allergies   Allergen Reactions    Levofloxacin      Rash       DIET  No orders of the defined types were placed in this encounter.      ACTIVITY  As tolerated.  Weight bearing as tolerated    CONSULTATIONS  None    PROCEDURES  None    LABORATORY  Lab Results   Component Value Date    SODIUM 136 04/14/2025    POTASSIUM 3.7 04/14/2025    CHLORIDE 105 04/14/2025    CO2 21 04/14/2025    GLUCOSE 124 (H) 04/14/2025    BUN 22 04/14/2025    CREATININE 1.35 04/14/2025        Lab Results   Component Value Date    WBC 5.7 04/14/2025    HEMOGLOBIN 14.8 04/14/2025    HEMATOCRIT 43.8 04/14/2025    PLATELETCT 166 04/14/2025      No orders to display         Total time of the discharge process exceeds 35 minutes.

## 2025-04-14 NOTE — PROGRESS NOTES
Inpatient Anticoagulation Service Note for 4/13/2025    Reason for Anticoagulation: Stroke, Antiphospholipid Syndrome    Hemoglobin Value: 16.2  Hematocrit Value: 49  Lab Platelet Value: 241  Target INR: 2.0 to 3.0     Date/Time INR Value    04/13/25 0836 1.9            Date/Time Dose (mg)    04/13/25 1659 7.5           Average Dose (mg): Home dosing: warfarin 5 mg daily  Significant Interactions: Antibiotics (Levofloxacin, Zosyn, Ciprofloxacin, Cephalexin)  Bridge Therapy: No  Reversal Agent Administered: Not Applicable    Comments: Patient is on warfarin for history of antiphospholipid syndrome and CVA. His home dosing is 5 mg daily. He was admitted for a left leg cellulitis with adverse reaction to the levofloxacin he was given. His antibiotics were changed to Zosyn, then subsequently to ciprofloxacin and cephalexin.  INR was slightly subtherapeutic today at 1.9 (goal 2-3). CBC within normal limits. DDIs with current antibiotics, could increase INR. Last dose of warfarin was 4/11 PM (no dose last night). Will give increased dose of warfarin 7.5 mg today with plan to resume 5 mg daily dosing tomorrow. Repeat INR tomorrow. Pharmacy will continue to follow and make adjustments as appropriate.     Plan: warfarin 7.5 mg  Education Material Provided?: No (Established warfarin patient)    Pharmacist suggested discharge dosing: Likely can resume home dosing of warfarin 5 mg daily, pending INR trend. Recommend repeat INR within 48-72 hours of discharge.      Sheeba Bah, PharmD

## 2025-04-14 NOTE — CARE PLAN
The patient is Stable - Low risk of patient condition declining or worsening    Shift Goals  Clinical Goals: Pt blood glucose will be controlled throughout the shift  Patient Goals: COmfort and answers  Family Goals: LEYLA    Progress made toward(s) clinical / shift goals:  Pts glucose was monitored throughout the shift ACHS. Pt did not require insulin during the shift        Problem: Knowledge Deficit - Standard  Goal: Patient and family/care givers will demonstrate understanding of plan of care, disease process/condition, diagnostic tests and medications  Outcome: Progressing     Problem: Hemodynamics  Goal: Patient's hemodynamics, fluid balance and neurologic status will be stable or improve  Outcome: Progressing     Problem: Fluid Volume  Goal: Fluid volume balance will be maintained  Outcome: Progressing     Problem: Respiratory  Goal: Patient will achieve/maintain optimum respiratory ventilation and gas exchange  Outcome: Progressing       Patient is not progressing towards the following goals:

## 2025-04-14 NOTE — PROGRESS NOTES
Discharge orders received.  Patient arrived to the discharge lounge.  PIV removed by discharge RN. Meds to beds medications verified by discharge RN, antibiotics and benadryl prescriptions sent to patients home pharmacy.  Instructions given, medications reviewed and general discharge education provided to patient.  Follow up appointments discussed.  Patient verbalized understanding of dc instructions and prescriptions.  Patient signed discharge instructions.  Patient verbalized he had all belongings with him, Denied having any home medications locked in our inpatient pharmacy that  he needs back. Patient ambulated with steady gait from discharge lounge to private vehicle. Patient left via car with family to home in stable condition.

## 2025-04-17 ENCOUNTER — HOSPITAL ENCOUNTER (EMERGENCY)
Facility: MEDICAL CENTER | Age: 40
End: 2025-04-17
Attending: EMERGENCY MEDICINE
Payer: COMMERCIAL

## 2025-04-17 ENCOUNTER — APPOINTMENT (OUTPATIENT)
Dept: RADIOLOGY | Facility: MEDICAL CENTER | Age: 40
End: 2025-04-17
Attending: EMERGENCY MEDICINE
Payer: COMMERCIAL

## 2025-04-17 VITALS
BODY MASS INDEX: 54.88 KG/M2 | RESPIRATION RATE: 17 BRPM | OXYGEN SATURATION: 100 % | DIASTOLIC BLOOD PRESSURE: 89 MMHG | WEIGHT: 315 LBS | HEART RATE: 84 BPM | TEMPERATURE: 97.6 F | SYSTOLIC BLOOD PRESSURE: 134 MMHG

## 2025-04-17 DIAGNOSIS — R20.2 PARESTHESIA: ICD-10-CM

## 2025-04-17 LAB
ALBUMIN SERPL BCP-MCNC: 3.4 G/DL (ref 3.2–4.9)
ALBUMIN/GLOB SERPL: 0.9 G/DL
ALP SERPL-CCNC: 68 U/L (ref 30–99)
ALT SERPL-CCNC: 39 U/L (ref 2–50)
ANION GAP SERPL CALC-SCNC: 9 MMOL/L (ref 7–16)
AST SERPL-CCNC: 38 U/L (ref 12–45)
BASOPHILS # BLD AUTO: 0.5 % (ref 0–1.8)
BASOPHILS # BLD: 0.03 K/UL (ref 0–0.12)
BILIRUB SERPL-MCNC: 1 MG/DL (ref 0.1–1.5)
BUN SERPL-MCNC: 17 MG/DL (ref 8–22)
CALCIUM ALBUM COR SERPL-MCNC: 9.5 MG/DL (ref 8.5–10.5)
CALCIUM SERPL-MCNC: 9 MG/DL (ref 8.5–10.5)
CHLORIDE SERPL-SCNC: 105 MMOL/L (ref 96–112)
CO2 SERPL-SCNC: 23 MMOL/L (ref 20–33)
CREAT SERPL-MCNC: 1 MG/DL (ref 0.5–1.4)
CRP SERPL HS-MCNC: 1.51 MG/DL (ref 0–0.75)
EOSINOPHIL # BLD AUTO: 0.08 K/UL (ref 0–0.51)
EOSINOPHIL NFR BLD: 1.4 % (ref 0–6.9)
ERYTHROCYTE [DISTWIDTH] IN BLOOD BY AUTOMATED COUNT: 40.3 FL (ref 35.9–50)
ERYTHROCYTE [SEDIMENTATION RATE] IN BLOOD BY WESTERGREN METHOD: 19 MM/HOUR (ref 0–20)
EST. AVERAGE GLUCOSE BLD GHB EST-MCNC: 212 MG/DL
GFR SERPLBLD CREATININE-BSD FMLA CKD-EPI: 96 ML/MIN/1.73 M 2
GLOBULIN SER CALC-MCNC: 3.8 G/DL (ref 1.9–3.5)
GLUCOSE SERPL-MCNC: 164 MG/DL (ref 65–99)
HBA1C MFR BLD: 9 % (ref 4–5.6)
HCT VFR BLD AUTO: 45.5 % (ref 42–52)
HGB BLD-MCNC: 15.6 G/DL (ref 14–18)
IMM GRANULOCYTES # BLD AUTO: 0.03 K/UL (ref 0–0.11)
IMM GRANULOCYTES NFR BLD AUTO: 0.5 % (ref 0–0.9)
LYMPHOCYTES # BLD AUTO: 1.01 K/UL (ref 1–4.8)
LYMPHOCYTES NFR BLD: 18 % (ref 22–41)
MAGNESIUM SERPL-MCNC: 1.7 MG/DL (ref 1.5–2.5)
MCH RBC QN AUTO: 29.2 PG (ref 27–33)
MCHC RBC AUTO-ENTMCNC: 34.3 G/DL (ref 32.3–36.5)
MCV RBC AUTO: 85.2 FL (ref 81.4–97.8)
MONOCYTES # BLD AUTO: 0.35 K/UL (ref 0–0.85)
MONOCYTES NFR BLD AUTO: 6.2 % (ref 0–13.4)
NEUTROPHILS # BLD AUTO: 4.12 K/UL (ref 1.82–7.42)
NEUTROPHILS NFR BLD: 73.4 % (ref 44–72)
NRBC # BLD AUTO: 0 K/UL
NRBC BLD-RTO: 0 /100 WBC (ref 0–0.2)
PHOSPHATE SERPL-MCNC: 2.8 MG/DL (ref 2.5–4.5)
PLATELET # BLD AUTO: 198 K/UL (ref 164–446)
PMV BLD AUTO: 9.3 FL (ref 9–12.9)
POTASSIUM SERPL-SCNC: 4.2 MMOL/L (ref 3.6–5.5)
PROT SERPL-MCNC: 7.2 G/DL (ref 6–8.2)
RBC # BLD AUTO: 5.34 M/UL (ref 4.7–6.1)
SODIUM SERPL-SCNC: 137 MMOL/L (ref 135–145)
WBC # BLD AUTO: 5.6 K/UL (ref 4.8–10.8)

## 2025-04-17 PROCEDURE — 85652 RBC SED RATE AUTOMATED: CPT

## 2025-04-17 PROCEDURE — 93970 EXTREMITY STUDY: CPT

## 2025-04-17 PROCEDURE — 86140 C-REACTIVE PROTEIN: CPT

## 2025-04-17 PROCEDURE — 84100 ASSAY OF PHOSPHORUS: CPT

## 2025-04-17 PROCEDURE — 85025 COMPLETE CBC W/AUTO DIFF WBC: CPT

## 2025-04-17 PROCEDURE — 83036 HEMOGLOBIN GLYCOSYLATED A1C: CPT

## 2025-04-17 PROCEDURE — 80053 COMPREHEN METABOLIC PANEL: CPT

## 2025-04-17 PROCEDURE — 99285 EMERGENCY DEPT VISIT HI MDM: CPT

## 2025-04-17 PROCEDURE — 83735 ASSAY OF MAGNESIUM: CPT

## 2025-04-17 PROCEDURE — 36415 COLL VENOUS BLD VENIPUNCTURE: CPT

## 2025-04-17 RX ORDER — CIPROFLOXACIN 750 MG/1
750 TABLET, FILM COATED ORAL 2 TIMES DAILY
Qty: 20 TABLET | Refills: 0 | Status: ACTIVE | OUTPATIENT
Start: 2025-04-17 | End: 2025-04-27

## 2025-04-17 ASSESSMENT — FIBROSIS 4 INDEX: FIB4 SCORE: 1.19

## 2025-04-17 NOTE — ED PROVIDER NOTES
ED Provider Note    CHIEF COMPLAINT  Chief Complaint   Patient presents with    Numbness    Tingling       EXTERNAL RECORDS REVIEWED  Inpatient Notes patient was admitted here at [facility] April 13 through April 14, 2025.  He presented with fever, chills, throat swelling and itchiness after taking 1 dose of Levaquin.  It was reported the patient has been seeing wound care as an outpatient for left leg wound.  He was reportedly started on Augmentin for the left leg wound.  He received a call from the wound clinic informing him that he had Pseudomonas grew out of his wound culture.  He was then transition to Levaquin.  He took 1 dose of Levaquin and started having a rash and swelling.  He thought he was having an allergic reaction.  The case was discussed with the infectious disease pharmacist who determined that the patient had previously tolerated ciprofloxacin.  For this reason Zosyn was discontinued and patient was transitioned to oral Cipro.  The patient did not have any side effects related to the oral Cipro administration while here in the hospital.  He had redness to his arms and his back which was thought to be drug reaction.  He was given some Benadryl.  He also had some mild ABHIJIT which resolved with IV fluid resuscitation.        HPI/ROS  LIMITATION TO HISTORY   Select: : None  OUTSIDE HISTORIAN(S):  None    Christ Calixto is a 40 y.o. male who presents to the ER complaining of tingling to the bottoms of both feet as well as the tops of all of the toes of both feet as well as tingling to the dorsal surface of bilateral hands extending onto the dorsal surfaces of digits #2 and 3 of both hands.  Symptoms began several hours before he was discharged from the hospital on  April 14th after he was admitted for fever and redness to her arms and back which was thought to be related to a drug reaction (Levaquin).  Patient was given oral and topical Benadryl for his rash which provided some relief.  He was  found to have some ABHIJIT and was given some IV fluids.  ABHIJIT resolved on day of discharge.  Per the discharge summary, patient was sent home on 750 of Cipro for 10 days as it was determined by infectious disease the patient had tolerated Cipro in the past.  He was started on terbinafine for onychomycosis.     PAST MEDICAL HISTORY   has a past medical history of Acute midline low back pain with left-sided sciatica (9/3/2020), Anxiety (2/18/2020), Arthritis, Blood clotting disorder (HCC) (2019), Diabetes ([facility]), Heart burn, Hemorrhagic disorder ([facility]), Hypertension, Obesities, morbid ([facility]), JAYLIN (obstructive sleep apnea) (1/30/2020), Seizure ([facility]) (12/30/2019), Smoking (7/10/2014), and Stroke ([facility]).    SURGICAL HISTORY   has a past surgical history that includes sigmoidoscopy,diagnostic (N/A, 10/12/2021) and removal anal fistula,subcutaneous (N/A, 11/9/2021).    FAMILY HISTORY  Family History   Problem Relation Age of Onset    Diabetes Mother     Diabetes Father     Heart Disease Paternal Grandfather         MI       SOCIAL HISTORY  Social History     Tobacco Use    Smoking status: Every Day     Current packs/day: 0.40     Average packs/day: 0.9 packs/day for 16.3 years (14.3 ttl pk-yrs)     Types: Cigarettes     Start date: 9/28/2006     Last attempt to quit: 1/30/2019    Smokeless tobacco: Never   Vaping Use    Vaping status: Never Used   Substance and Sexual Activity    Alcohol use: No    Drug use: Yes     Types: Inhaled     Comment: ocassional marijuana    Sexual activity: Not Currently       CURRENT MEDICATIONS  Home Medications       Reviewed by Misa Galvin R.N. (Registered Nurse) on 04/17/25 at 1334  Med List Status: Partial     Medication Last Dose Status   amoxicillin-clavulanate (AUGMENTIN) 875-125 MG Tab  Active   atorvastatin (LIPITOR) 20 MG Tab  Active   Cholecalciferol (VITAMIN D) 2000 UNIT Tab  Active   ciprofloxacin (CIPRO) 750 MG Tab  Active   diphenhydrAMINE (BENADRYL) 25 MG Tab  Active    diphenhydrAMINE-zinc acetate (BENADRYL ITCH) cream  Active   glyBURIDE-metFORMIN (GLUCOVANCE) 5-500 MG Tab  Active   lisinopril-hydrochlorothiazide (PRINZIDE) 20-25 MG per tablet  Active   nicotine (NICODERM) 14 MG/24HR PATCH 24 HR  Active   nicotine polacrilex (NICORETTE) 2 MG Gum  Active   terbinafine (LAMISIL) 250 MG Tab  Active   warfarin (COUMADIN) 5 MG Tab  Active                    ALLERGIES  Allergies   Allergen Reactions    Levofloxacin      Rash       PHYSICAL EXAM  VITAL SIGNS: /89   Pulse 84   Temp 36.4 °C (97.6 °F) (Temporal)   Resp 17   Wt (!) 174 kg (382 lb 8 oz)   SpO2 100%   BMI 54.88 kg/m²    Constitutional:  Well developed, well nourished; anxious.  HENT: Normocephalic, Atraumatic, Bilateral external ears normal, dry mucous membranes.  Markedly elevated BMI  Eyes: PERRL, EOMI, Conjunctiva normal, No discharge.   Neck: Normal range of motion, supple, nontender  Lymphatic: No lymphadenopathy noted.   Cardiovascular: Normal heart rate, Normal rhythm, No murmurs, rubs or gallops   Thorax & Lungs: CTA=bilaterally;  No respiratory distress,  No wheezing rales, or rhonchi; No chest tenderness. No crepitus or subQ air  Abdomen: soft, good bowel sounds, no guarding no rebound, no masses, no pulsatile mass, no tenderness, no distention  Skin: Warm, Dry, No erythema, No rash.   Back: No tenderness, No CVA tenderness.   Extremities: 2+ dp and pt pulses bilateral LEs; 2+ radial pulses bilaterally.  trace to 1+ pretibial edema bilaterally.  Lower extremities have a blotchy purpleish red discoloration which is not raised to bilateral lower legs.  The blotchy purplish red discoloration is not blanchable.  No petechiae.  No palpable purpura.  No blistering or desquamation of the skin.  Bandage overlying the left lateral ankle.  Neurologic: Alert & oriented x 4, clear speech, upper extremity strength are 5 out of 5 and equal bilaterally full , pincer , interossei and wrist extensors.   Sensation is intact to light touch to bilateral wrists, bilateral forearms, and bilateral upper arms.  Sensation is intact to light touch to palms.  Patient describes some decrease sensation with light touch over the dorsum of bilateral hands and over the dorsum of the finger #2 and 3 on bilateral hands.  Lower extremity strength are 5 out of 5 and equal to testing of dorsiflexors and plantar flexors.  Patient describes decreased sensation to the soles of bilateral feet onto the dorsums of bilateral mid feet and dorsums of bilateral toes.  Deep tendon reflexes are 2 out of 4 and equal at knees.  Deep tendon reflexes are 1 out of 4 and equal at ankles.  Psychiatric: appropriate, normal affect     EKG/LABS  Results for orders placed or performed during the hospital encounter of 04/17/25   CBC WITH DIFFERENTIAL    Collection Time: 04/17/25  3:35 PM   Result Value Ref Range    WBC 5.6 4.8 - 10.8 K/uL    RBC 5.34 4.70 - 6.10 M/uL    Hemoglobin 15.6 14.0 - 18.0 g/dL    Hematocrit 45.5 42.0 - 52.0 %    MCV 85.2 81.4 - 97.8 fL    MCH 29.2 27.0 - 33.0 pg    MCHC 34.3 32.3 - 36.5 g/dL    RDW 40.3 35.9 - 50.0 fL    Platelet Count 198 164 - 446 K/uL    MPV 9.3 9.0 - 12.9 fL    Neutrophils-Polys 73.40 (H) 44.00 - 72.00 %    Lymphocytes 18.00 (L) 22.00 - 41.00 %    Monocytes 6.20 0.00 - 13.40 %    Eosinophils 1.40 0.00 - 6.90 %    Basophils 0.50 0.00 - 1.80 %    Immature Granulocytes 0.50 0.00 - 0.90 %    Nucleated RBC 0.00 0.00 - 0.20 /100 WBC    Neutrophils (Absolute) 4.12 1.82 - 7.42 K/uL    Lymphs (Absolute) 1.01 1.00 - 4.80 K/uL    Monos (Absolute) 0.35 0.00 - 0.85 K/uL    Eos (Absolute) 0.08 0.00 - 0.51 K/uL    Baso (Absolute) 0.03 0.00 - 0.12 K/uL    Immature Granulocytes (abs) 0.03 0.00 - 0.11 K/uL    NRBC (Absolute) 0.00 K/uL   COMP METABOLIC PANEL    Collection Time: 04/17/25  3:35 PM   Result Value Ref Range    Sodium 137 135 - 145 mmol/L    Potassium 4.2 3.6 - 5.5 mmol/L    Chloride 105 96 - 112 mmol/L    Co2 23 20 -  33 mmol/L    Anion Gap 9.0 7.0 - 16.0    Glucose 164 (H) 65 - 99 mg/dL    Bun 17 8 - 22 mg/dL    Creatinine 1.00 0.50 - 1.40 mg/dL    Calcium 9.0 8.5 - 10.5 mg/dL    Correct Calcium 9.5 8.5 - 10.5 mg/dL    AST(SGOT) 38 12 - 45 U/L    ALT(SGPT) 39 2 - 50 U/L    Alkaline Phosphatase 68 30 - 99 U/L    Total Bilirubin 1.0 0.1 - 1.5 mg/dL    Albumin 3.4 3.2 - 4.9 g/dL    Total Protein 7.2 6.0 - 8.2 g/dL    Globulin 3.8 (H) 1.9 - 3.5 g/dL    A-G Ratio 0.9 g/dL   MAGNESIUM    Collection Time: 04/17/25  3:35 PM   Result Value Ref Range    Magnesium 1.7 1.5 - 2.5 mg/dL   CRP QUANTITIVE (NON-CARDIAC)    Collection Time: 04/17/25  3:35 PM   Result Value Ref Range    Stat C-Reactive Protein 1.51 (H) 0.00 - 0.75 mg/dL   Sed Rate    Collection Time: 04/17/25  3:35 PM   Result Value Ref Range    Sed Rate Westergren 19 0 - 20 mm/hour   HEMOGLOBIN A1C    Collection Time: 04/17/25  3:35 PM   Result Value Ref Range    Glycohemoglobin 9.0 (H) 4.0 - 5.6 %    Est Avg Glucose 212 mg/dL   PHOSPHORUS    Collection Time: 04/17/25  3:35 PM   Result Value Ref Range    Phosphorus 2.8 2.5 - 4.5 mg/dL   ESTIMATED GFR    Collection Time: 04/17/25  3:35 PM   Result Value Ref Range    GFR (CKD-EPI) 96 >60 mL/min/1.73 m 2          RADIOLOGY/PROCEDURES   I have independently interpreted the diagnostic imaging associated with this visit and am waiting the final reading from the radiologist.       Radiologist interpretation:  US-EXTREMITY VENOUS LOWER BILAT   Final Result          Vascular Laboratory   CONCLUSIONS      No deep venous thrombosis.    COURSE & MEDICAL DECISION MAKING    ASSESSMENT, COURSE AND PLAN  Care Narrative: Patient presents to the ER complaining of tingling to the bottoms of bilateral feet which goes up onto the dorsal surface of bilateral toes which began while he was here in the hospital several days ago admitted for what was thought to be a drug reaction or possibly cellulitis.  Patient also complains of tingling to the tops of  bilateral hands and the tops of bilateral 2nd and 3rd fingers.  Tingling is not anywhere other than the tops of bilateral hands and the tops of bilateral 2nd and 3rd fingers.  Tingling is nowhere on the lower extremities other than on the soles of the feet and of the dorsums of bilateral toes.  Patient states that when he was here in the hospital a few days ago with this drug reaction he had swelling and redness to his arms and legs.  He says his legs and arms still feel little bit swollen although they are better.  The patient has diabetes.  Patient's symptoms are bilateral and very localized to specific areas of the feet and hands.  I suspect that patient's tingling is possibly related to a combination of his diabetes as well as to the swelling that he had to his arms and legs.  Perhaps the skin is a little more tense overlying the areas of swelling and is impacting the small nerves, which likely have already been somewhat injured by his hyperglycemia/diabetes.  Patient has a normal neurologic examination.  There is no weakness.  He has good reflexes.  At this time I do not think he has Guillain-Barré or transverse myelitis. He has no complaints of headache.  No back pain.  No weakness.  He can walk.  No trouble breathing.  Other than the tingling on the soles of his feet and the dorsum of his toes, he has no tingling to anywhere else on his legs.  The tingling then jumps up to a very localized distribution on the dorsal surfaces of bilateral hands and fingers #2 and 3.  This is not consistent with an ascending neuropathy.  Symptoms are bilateral, and again very localized.  I do not think this is stroke or TIA.  Laboratory evaluation was performed and there are no electrolyte abnormalities.  Patient is well-appearing.  He does not appear to be septic or toxic.  He says he feels improved overall from when he was admitted here a few days ago.  He has not had any fevers since his admission.  Ultrasounds of the lower  extremities were performed given patient complaint of swelling to both legs and recent hospitalization.  Ultrasound is negative for DVT.  He has good pedal and radial pulses.  No concern for arterial compromise.  Patient was given a dose of terbinafine in the hospital for onychomycosis.  Pharmacist does not feel that this could have contributed to his paresthesia.  Additionally, 1 dose of Levaquin probably did not contribute to his paresthesias either.  At this time I think the patient is safe for discharge home.  He is to follow-up with his primary care physician.  He is been given very strict return precautions and discharge instructions and he understands treatment plan and follow-up.      DISPOSITION AND DISCUSSIONS  I have discussed management of the patient with the following physicians and NATHEN's:  none    Discussion of management with other [facility] or appropriate source(s): PharmacistJohnathan.  He has reviewed the chart and said that the prescription for Cipro which was prescribed upon discharge several days ago was sent to the [facility] pharmacy on [address].    Escalation of care considered, and ultimately not performed:diagnostic imaging.  Patient describes tingling to the soles of bilateral feet which extends up to the dorsal surfaces of bilateral toes.  He also complains of tingling to the dorsum of bilateral hands and the dorsum of fingers #2 and 3 bilaterally.  No other tingling.  Symptoms are bilateral.  He has no headache.  Neurologic exam is normal.  No other neurocomplaints.  Given distribution of patient's bilateral hand and feet tingling, I do not think he has any neurologic/Brain pathology causing his symptoms.  No need for neuroimaging at this time.    Barriers to care at this time, including but not limited to:  None known .     Decision tools and prescription drugs considered including, but not limited to: Pain Medications patient is not having any pain.  No need for pain medication. .    FINAL  DIAGNOSIS  1. Paresthesia Acute        This dictation has been created using voice recognition software. The accuracy of the dictation is limited by the abilities of the software. I expect there may be some errors of grammar and possibly content. I made every attempt to manually correct the errors within my dictation. However, errors related to voice recognition software may still exist and should be interpreted within the appropriate context.     Electronically signed by: Rosetta Marte M.D., 4/17/2025 3:18 PM

## 2025-04-18 ENCOUNTER — DOCUMENTATION (OUTPATIENT)
Dept: WOUND CARE | Facility: MEDICAL CENTER | Age: 40
End: 2025-04-18
Payer: COMMERCIAL

## 2025-04-18 LAB
BACTERIA BLD CULT: NORMAL
BACTERIA BLD CULT: NORMAL
SIGNIFICANT IND 70042: NORMAL
SIGNIFICANT IND 70042: NORMAL
SITE SITE: NORMAL
SITE SITE: NORMAL
SOURCE SOURCE: NORMAL
SOURCE SOURCE: NORMAL

## 2025-04-18 NOTE — ED NOTES
Pt d/c from ED by this RN, Lilo GARDNER and ERP Dr. Marte. Pt d/c from ED a/o x 4 GCS 15 ambulatory without assistance with steady gait. ED KENDRA Nagy removed 18G PIV from L FA. Pt given d/c instructions and verbalized understanding.

## 2025-04-18 NOTE — PROGRESS NOTES
Received message that patient had called regarding which antibiotics he is supposed to be on.  Contacted patient back and left voice message.  Reviewed previous admission from 4/13/2025 - 4/14/2025 and also ER visit from yesterday.  Patient to complete 10-day course of ciprofloxacin 750 mg twice daily.  He is to complete 10-day course of Augmentin 875/125 mg twice daily.  He does have an appointment with wound care clinic on 4/21/2025.

## 2025-04-18 NOTE — DISCHARGE INSTRUCTIONS
Follow-up with your primary care nurse practitioner within the next 1 to 2 days.  Please call for appointment.    Return to the ER for any worsening numbness or tingling, changing numbness or tingling, weakness of your arms or legs, fevers over 100.4, shaking chills, worsening redness or swelling to your legs, dizziness or lightheadedness, headache, vertigo, vision changes, speech disturbance, difficulty walking, or for any concerns.    Please be sure that you  your ciprofloxacin.  It was sent to the [facility] on [address] when you were discharged from the hospital a couple days ago.

## 2025-04-21 ENCOUNTER — NON-PROVIDER VISIT (OUTPATIENT)
Dept: WOUND CARE | Facility: MEDICAL CENTER | Age: 40
End: 2025-04-21
Attending: NURSE PRACTITIONER
Payer: COMMERCIAL

## 2025-04-21 PROCEDURE — 97602 WOUND(S) CARE NON-SELECTIVE: CPT

## 2025-04-21 NOTE — PROGRESS NOTES
"Pt here after hospital stay for wound infection.  Pt stated \"they didn't give me any prescriptions when I left\".  Pt prescriptions were sent to pharmacy on file and copy of current AVS was printed and reviewed with Pt.    Pt wounds are scabbed and stable today.  Pt feels he does not need to take them.  Explained in detail why he should follow instructions but states \"I'm scared and don't want my feet to go numb\"  Very difficult to  communicate with Pt and is inclined to selective hearing of information.      Consulted with our provider today and she also strongly suggested to take the antibiotics.      Non-selective debridement of wounds with dry gauze and Hypochlorous acid to cleanse wounds.  See wound flowsheet for details.   "

## 2025-04-21 NOTE — PATIENT INSTRUCTIONS
-Keep your wound dressing clean, dry, and intact. Only change dressing if it's over saturated, soiled or falls off.     -Change your dressing if it becomes soiled, soaked, or falls off.      -Should you experience any significant changes in your wound(s), such as infection (redness, swelling, localized heat, increased pain, fever > 101 F, chills) or have any questions regarding your home care instructions, please contact the wound center at [phone]. If after hours, contact your primary care physician or go to the hospital emergency room.     If you are admitted to any hospital, you will need a new referral to come back to the wound clinic and any scheduled appointments that you already have, may be cancelled.

## 2025-04-23 NOTE — DOCUMENTATION QUERY
[facility]                                                                       Query Response Note      PATIENT:               GIL ELIAS  ACCT #:                  [id number]  MRN:                     [id number]  :                      1985  ADMIT DATE:       2025 12:51 AM  DISCH DATE:        2025 1:23 PM  RESPONDING  PROVIDER #:        [id number]           QUERY TEXT:    Patient with LLE cellulitis and outpatient wound cultures positive for Pseudomonas and MSSA.  Was given PO Levofloxacin, then developed itchy  sore throat and felt fever/chills, suspected allergic reaction.     The diagnosis of sepsis has been documented in the Medical Record without an associated organ dysfunction. SIRS  with HR of 140. Received IV Zosyn the day of admit, changed to PO antibiotics and discharged the next day.    Please clarify the status of sepsis by providing SIRS criteria and sepsis-related organ dysfunction.    Note: If you agree with a diagnosis listed, please remember to make an addendum to the Discharge Summary.    Sepsis - real or suspected infection plus 2 or more SIRS criteria + organ dysfunction related to sepsis    Temp <96.8 or >101  HR >90  RR >20  WBC <4,000 or > 12,000 or >10% bandemia    The patient's Clinical Indicators include:  Vitals on arrival:   Labs on admit: 9.7; Lactic acid 2.7    H&P: sepsis secondary to cellulitis...soft BPs, and tachycardic, nearly meeting sepsis criteria; ABHIJIT - could be secondary to end organ damage from infection  DC Summary: Sepsis due to cellulitis    Treatment:   - IV Zosyn 4.5 mg x2; IV Zosyn 3.375 mg x1; IV LR 3190 ml in boluses; IV LR infusion @ 150 ml/hr x1   - PO Augmentin; PO Keflex; PO Cipro    Risk factors: Cellulitis; ABHIJIT; Lactic acidosis; DM type 2    Thank you,  Jessika WHITTEN  Clinical   Connect via [employer]  Options  provided:   -- Sepsis exists, (provide SIRS criteria)   -- Sepsis does not exist - amended documentation in the medical record and updated problem list   -- Other explanation, Please specify      Query created by: Jessika Boston on 4/18/2025 7:31 AM    RESPONSE TEXT:    Sepsis does not exist - amended documentation in the medical record and updated problem list          Electronically signed by:  SUZY MORAN MD 4/23/2025 5:57 AM

## 2025-04-28 ENCOUNTER — APPOINTMENT (OUTPATIENT)
Dept: WOUND CARE | Facility: MEDICAL CENTER | Age: 40
End: 2025-04-28
Attending: NURSE PRACTITIONER
Payer: COMMERCIAL

## 2025-05-05 ENCOUNTER — NON-PROVIDER VISIT (OUTPATIENT)
Dept: WOUND CARE | Facility: MEDICAL CENTER | Age: 40
End: 2025-05-05
Attending: NURSE PRACTITIONER
Payer: COMMERCIAL

## 2025-05-05 PROCEDURE — 99213 OFFICE O/P EST LOW 20 MIN: CPT

## 2025-05-05 NOTE — PROGRESS NOTES
Non-selective debridement was performed using hypochlorous acid, gauze, and an emery board to remove non-viable tissue and scab from the wound bed and periwound areas.    A skin moisturizer was applied to the left lower extremity. At this time, all wounds are resolved.    Due to the patient’s history of frequent wound recurrence, a follow-up appointment was scheduled for 2 weeks. The patient was instructed to cancel the appointment if the wound sites remain intact and no new issues arise.    The patient was educated on the fragility of recently healed wound sites, including:  - The area should be bathed and dried gently.  - Healed skin only regains a maximum of ~80% of the original tensile strength.  - Scar remodeling may continue for 6 months to 1 year post-closure.    The patient tolerated the procedure well without complication. Refer to the flow sheet for detailed wound assessment, materials used, and progress notes.

## 2025-05-05 NOTE — PATIENT INSTRUCTIONS
- Should you experience any significant changes in your wound(s), such as infection (redness, swelling, localized heat, increased pain, fever > 101 F, chills) or have any questions regarding your home care instructions, please contact the wound center at [phone]. If after hours, contact your primary care physician or go to the hospital emergency room.     - If you are admitted to any hospital, you will need a new referral to come back to the wound clinic and any scheduled appointments that you already have, may be cancelled.    - If you are 5 or more minutes late for an appointment, we reserve the right to cancel and reschedule that appointment. Additionally, if you are habitually late or not showing (3 late cancellations and/or no shows), we reserve the right to cancel your remaining appointments and it will be your responsibility to obtain a new referral if services are still needed.    - Resolved wound be fragile for a few days, bathe and dry area gently, only ever regains a maximum of 80% of the tensile strength of the surrounding skin, remodeling of scar can continue for 6mo - a year. Contact PCP for a referral back her if any problems with area opening and draining again.

## 2025-05-19 ENCOUNTER — APPOINTMENT (OUTPATIENT)
Dept: WOUND CARE | Facility: MEDICAL CENTER | Age: 40
End: 2025-05-19
Attending: NURSE PRACTITIONER
Payer: COMMERCIAL

## 2025-05-26 ENCOUNTER — TELEPHONE (OUTPATIENT)
Dept: WOUND CARE | Facility: MEDICAL CENTER | Age: 40
End: 2025-05-26
Payer: COMMERCIAL

## 2025-05-26 NOTE — TELEPHONE ENCOUNTER
Request for supplemental documentation to justify wound supply order.     Provider clinical note from 6/25/2024 visit uploaded into [employer] portal.

## 2025-06-02 ENCOUNTER — APPOINTMENT (OUTPATIENT)
Dept: WOUND CARE | Facility: MEDICAL CENTER | Age: 40
End: 2025-06-02
Attending: NURSE PRACTITIONER
Payer: COMMERCIAL

## 2025-06-03 NOTE — PROGRESS NOTES
Anticoagulation Summary  As of 2020    INR goal:  2.0-3.0   TTR:  79.1 % (2.2 mo)   INR used for dosin.30 (2020)   Warfarin maintenance plan:  10 mg (10 mg x 1) every Mon, Wed; 5 mg (5 mg x 1) all other days   Weekly warfarin total:  45 mg   Plan last modified:  Eloisa Snyder, PharmD (9/10/2020)   Next INR check:  2020   Target end date:  11/3/2020    Indications    Lupus anticoagulant positive [R76.0]  Cerebrovascular accident (CVA) due to vascular occlusion (HCC) [I63.9]             Anticoagulation Episode Summary     INR check location:      Preferred lab:      Send INR reminders to:      Comments:        Anticoagulation Care Providers     Provider Role Specialty Phone number    Aayush Parra M.D. Referring Oncology [phone]    [facility] Responsible  [phone]    Julio Gibbons, PharmD Responsible                  Anticoagulation Patient Findings      HPI:  Christ Calixto seen in clinic today, on anticoagulation therapy with warfarin for lupus anticoagulant positive, hx of CVA  Changes to current medical/health status since last appt: Pt's target end date for warfarin is tomorrow 11/3. Patient will be following up with cancer specialists to discuss the need for him to continue warfarin. Explained to patient that if his provider tomorrow states that chronic anticoagulation with warfarin is no longer needed, we can cancel his next appointment with us.  Denies signs/symptoms of bleeding and/or thrombosis since the last appt.    Denies any interval changes to diet  Denies any interval changes to medications since last appt.   Denies any complications or cost restrictions with current therapy.   Verified current warfarin dosing schedule.   Pt declined vitals  Medications reconciled      A/P   INR  remains therapeutic.   Continue current regimen. Patient will follow up with cancer specialist at appointment tomorrow to see if warfarin needed to be  Nephrology Progress Note:      Assessment & Plan   Acute on chronic renal failure: Review of prior lab results reveals serum creatinines that have fluctuated historically between 1.5 and 2.3 mg/dL.  Serum creatinine continues to fluctuate around the same range since admission.  Urinalysis is bland.  Renal and bladder ultrasound studies were normal.  Blood pressures have remained at the lower end of normal.     -   I suspect that the patient has an underlying ischemic nephropathy.     -  Differential diagnosis includes a cardiorenal syndrome.     -   Fluctuating creatinines are likely hemodynamically mediated.     Syncope: May have a cardiac etiology.  The patient has been diagnosed with a nonischemic cardiomyopathy and a TTE from 5/26/2025 documented severely reduced left ventricular systolic function with an ejection fraction of 17%.     -   Status post AICD placement.     -   Would avoid aggressive diuresis     -   I would also avoid angiotensin modulators/SGLT2 inhibitors.     -   Maintain hemodynamic stability.  Monitor renal function.     Anemia: Modest.  Normocytic Red cell indices.  Monitor.       -   Transferrin saturation came in at 15%.  Hemoglobin appears to be stable.     -   Oral iron replacement.    Ensure adequate nutritional intake        Subjective   Lying quietly in bed.  No new issues overnight.      PMHx, FHx, SHx, and review of systems reviewed and otherwise unchanged.      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  (CMD)    Pure hypercholesterolemia    Acquired hypothyroidism    Hyperglycemia    Hyperuricemia    Anxiety    Vitamin D deficiency    Low back pain radiating to right leg    Chronic bilateral pleural effusions    Atrial flutter  (CMD)    LBBB (left bundle branch block)    Deep vein thrombosis  (CMD)    Heart failure, unspecified  (CMD)    Cerebral atrophy (CMD)    Secondary hypercoagulable state   (CMD)    Gastroesophageal reflux disease without esophagitis    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 rate 16, height 5' 6\" (1.676 m), weight 65.3 kg (144 lb), SpO2 100%.  Body mass index is 23.24 kg/m².    Physical Exam  General: Awake alert oriented x3. In no obvious distress. Not obese.  HEENT: Clear conjunctivae. Normal exra-occular muscle movements. No rhinorrhea or sinus tenderness. Moist oral mucosa. No JVD or thyromegaly.  Respiratory: Good breath sounds bilaterally. No crepitations or rhonchi.  CVS: Normal heart sounds. Regular rate/rhythm. No murmurs. No pitting pedal edema.  GI: Abdomen is soft and full. No localized tenderness. No palpable organomegaly or masses. Good bowel sounds.  : Normal external genitalia. No CVA tenderness. No lugo catheter in situ.  MKS: No joint swelling or deformity. No muscle atrophy.   Neurology: No focal motor deficits. Normal speech. Alert and appropriate.  Skin: No rash or bruising    Labs     Recent Labs     06/01/25  0516 06/02/25  0515 06/03/25  0543   SODIUM 135 136 134*   POTASSIUM 4.7 4.6 4.6   CO2 21 21 20*   ANIONGAP 9 11 11   GLUCOSE 95 88 88   BUN 38* 41* 42*   CREATININE 2.01* 1.89* 2.02*   CALCIUM 8.7 9.0 8.9        Recent Labs     06/01/25  0516 06/02/25  0515 06/03/25  0543   WBC 4.7 4.5 4.9   RBC 3.35* 3.37* 3.34*   HGB 9.4* 9.4* 9.5*   HCT 31.1* 30.6* 29.9*    175 171   MCV 92.8 90.8 89.5   MCH 28.1 27.9 28.4   MCHC 30.2* 30.7* 31.8*   NRBCRE 0 0 0         Imaging          Marisol Lala MD       continued.    Follow up appointment in 2 week(s).    Jannet Rome, PharmD

## (undated) DEVICE — SUCTION INSTRUMENT YANKAUER OPEN TIP W/O VENT (50EA/CA)

## (undated) DEVICE — PROTECTOR ULNA NERVE - (36PR/CA)

## (undated) DEVICE — ELECTRODE 850 FOAM ADHESIVE - HYDROGEL RADIOTRNSPRNT (50/PK)

## (undated) DEVICE — TRAY SKIN SCRUB PVP WET (20EA/CA) PART #DYND70356 DISCONTINUED

## (undated) DEVICE — SUTURE 4-0 SILK 12 X 18 INCH - (36/BX)

## (undated) DEVICE — SODIUM CHL IRRIGATION 0.9% 1000ML (12EA/CA)

## (undated) DEVICE — HEMOSTAT SURG ABSORBABLE - 4 X 8 IN SURGICEL (24EA/CA)

## (undated) DEVICE — ARMREST CRADLE FOAM - (2PR/PK 12PR/CA)

## (undated) DEVICE — HEAD HOLDER JUNIOR/ADULT

## (undated) DEVICE — KIT ANESTHESIA W/CIRCUIT & 3/LT BAG W/FILTER (20EA/CA)

## (undated) DEVICE — TOWEL STOP TIMEOUT SAFETY FLAG (40EA/CA)

## (undated) DEVICE — WATER IRRIGATION STERILE 1000ML (12EA/CA)

## (undated) DEVICE — CANISTER SUCTION 3000ML MECHANICAL FILTER AUTO SHUTOFF MEDI-VAC NONSTERILE LF DISP  (40EA/CA)

## (undated) DEVICE — SET LEADWIRE 5 LEAD BEDSIDE DISPOSABLE ECG (1SET OF 5/EA)

## (undated) DEVICE — SET EXTENSION WITH 2 PORTS (48EA/CA) ***PART #2C8610 IS A SUBSTITUTE*****

## (undated) DEVICE — SUTURE GENERAL

## (undated) DEVICE — SLEEVE, VASO, THIGH, MED

## (undated) DEVICE — SUTURE 2-0 VICRYL PLUS SH - 8 X 18 INCH (12/BX)

## (undated) DEVICE — LACTATED RINGERS INJ 1000 ML - (14EA/CA 60CA/PF)

## (undated) DEVICE — MASK ANESTHESIA ADULT  - (100/CA)

## (undated) DEVICE — MANIFOLD NEPTUNE 1 PORT (20/PK)

## (undated) DEVICE — KIT CUSTOM PROCEDURE SINGLE FOR ENDO  (15/CA)

## (undated) DEVICE — SENSOR SPO2 NEO LNCS ADHESIVE (20/BX) SEE USER NOTES

## (undated) DEVICE — NEPTUNE 4 PORT MANIFOLD - (20/PK)

## (undated) DEVICE — ELECTRODE DUAL RETURN W/ CORD - (50/PK)

## (undated) DEVICE — SUCTION INSTRUMENT YANKAUER BULBOUS TIP W/O VENT (50EA/CA)

## (undated) DEVICE — GLOVE BIOGEL INDICATOR SZ 7SURGICAL PF LTX - (50/BX 4BX/CA)

## (undated) DEVICE — TUBING CLEARLINK DUO-VENT - C-FLO (48EA/CA)

## (undated) DEVICE — JELLY SURGILUBE STERILE TUBE 4.25 OZ (1/EA)

## (undated) DEVICE — FILM CASSETTE ENDO

## (undated) DEVICE — MASK PANORAMIC OXYGEN PRO2 (30EA/CA)

## (undated) DEVICE — CANISTER SUCTION RIGID RED 1500CC (40EA/CA)

## (undated) DEVICE — GOWN WARMING STANDARD FLEX - (30/CA)

## (undated) DEVICE — GLOVE BIOGEL SZ 7 SURGICAL PF LTX - (50PR/BX 4BX/CA)

## (undated) DEVICE — PACK MINOR BASIN - (2EA/CA)